# Patient Record
Sex: FEMALE | Race: BLACK OR AFRICAN AMERICAN | NOT HISPANIC OR LATINO | URBAN - METROPOLITAN AREA
[De-identification: names, ages, dates, MRNs, and addresses within clinical notes are randomized per-mention and may not be internally consistent; named-entity substitution may affect disease eponyms.]

---

## 2017-03-08 ENCOUNTER — EMERGENCY (EMERGENCY)
Facility: HOSPITAL | Age: 57
LOS: 0 days | Discharge: HOME | End: 2017-03-08
Attending: EMERGENCY MEDICINE
Payer: COMMERCIAL

## 2017-03-08 VITALS
DIASTOLIC BLOOD PRESSURE: 92 MMHG | OXYGEN SATURATION: 98 % | SYSTOLIC BLOOD PRESSURE: 138 MMHG | TEMPERATURE: 98 F | HEART RATE: 72 BPM | RESPIRATION RATE: 16 BRPM

## 2017-03-08 VITALS
SYSTOLIC BLOOD PRESSURE: 130 MMHG | DIASTOLIC BLOOD PRESSURE: 77 MMHG | TEMPERATURE: 98 F | WEIGHT: 199.96 LBS | OXYGEN SATURATION: 97 % | HEIGHT: 67 IN | RESPIRATION RATE: 16 BRPM | HEART RATE: 72 BPM

## 2017-03-08 DIAGNOSIS — Z98.891 HISTORY OF UTERINE SCAR FROM PREVIOUS SURGERY: Chronic | ICD-10-CM

## 2017-03-08 DIAGNOSIS — Z79.1 LONG TERM (CURRENT) USE OF NON-STEROIDAL ANTI-INFLAMMATORIES (NSAID): ICD-10-CM

## 2017-03-08 DIAGNOSIS — H92.02 OTALGIA, LEFT EAR: ICD-10-CM

## 2017-03-08 DIAGNOSIS — H66.92 OTITIS MEDIA, UNSPECIFIED, LEFT EAR: ICD-10-CM

## 2017-03-08 PROCEDURE — 99283 EMERGENCY DEPT VISIT LOW MDM: CPT | Mod: 25

## 2017-03-08 RX ORDER — IBUPROFEN 200 MG
1 TABLET ORAL
Qty: 9 | Refills: 0
Start: 2017-03-08 | End: 2017-03-11

## 2017-03-08 RX ORDER — IBUPROFEN 200 MG
600 TABLET ORAL ONCE
Qty: 0 | Refills: 0 | Status: COMPLETED | OUTPATIENT
Start: 2017-03-08 | End: 2017-03-08

## 2017-03-08 RX ADMIN — Medication 1 TABLET(S): at 07:59

## 2017-03-08 RX ADMIN — Medication 600 MILLIGRAM(S): at 07:59

## 2017-03-08 NOTE — ED PROVIDER NOTE - OBJECTIVE STATEMENT
57 years old female walked in c/o left ear pain 5/10 nasal congestion and runny nose and sore throat since yesterday. Pt denies headache, dizziness, cough, sob, chest pain, nausea, vomiting, fever, chills, abd pain.

## 2017-03-08 NOTE — ED ADULT TRIAGE NOTE - CHIEF COMPLAINT QUOTE
I started to have pain when I started to swallow and I have pain on the right side of my hairline, ear.

## 2017-03-08 NOTE — ED ADULT NURSE NOTE - OBJECTIVE STATEMENT
Pt states she feels a throbbing in her right ear that radiates to her neck, face and head.  Was sick with flu-like symptoms a few weeks ago.  Has had sinus issues in the past.

## 2017-04-12 NOTE — ED PROVIDER NOTE - MUSCULOSKELETAL NEGATIVE STATEMENT, MLM
GASTROINTESTINAL - ADULT    • Minimal or absence of nausea and vomiting Progressing    • Maintains or returns to baseline bowel function Progressing        HEMATOLOGIC - ADULT    • Maintains hematologic stability Progressing        METABOLIC/FLUID AND ELEC Mastitis     History of delirium     Renal failure (ARF), acute on chronic (HCC)     Uremic acidosis     Acidemia     Hypoxia     Seizure disorder (HCC)     Acute encephalopathy     Anemia of chronic disease     Type 2 diabetes mellitus with diabetic chron no back pain, no gout, no musculoskeletal pain, no neck pain, and no weakness.

## 2018-01-23 PROBLEM — Z00.00 ENCOUNTER FOR PREVENTIVE HEALTH EXAMINATION: Status: ACTIVE | Noted: 2018-01-23

## 2018-02-06 ENCOUNTER — APPOINTMENT (OUTPATIENT)
Dept: ORTHOPEDIC SURGERY | Facility: CLINIC | Age: 58
End: 2018-02-06
Payer: COMMERCIAL

## 2018-02-06 VITALS — WEIGHT: 230 LBS | BODY MASS INDEX: 36.1 KG/M2 | HEIGHT: 67 IN

## 2018-02-06 DIAGNOSIS — Z78.9 OTHER SPECIFIED HEALTH STATUS: ICD-10-CM

## 2018-02-06 PROCEDURE — 73020 X-RAY EXAM OF SHOULDER: CPT | Mod: LT

## 2018-02-06 PROCEDURE — 99203 OFFICE O/P NEW LOW 30 MIN: CPT

## 2018-03-10 NOTE — ED PROVIDER NOTE - CONSTITUTIONAL, MLM
The patient is a 56y Female complaining of congestion. normal... Well appearing, well nourished, awake, alert, oriented to person, place, time/situation and in no apparent distress.

## 2018-09-14 ENCOUNTER — EMERGENCY (EMERGENCY)
Facility: HOSPITAL | Age: 58
LOS: 0 days | Discharge: HOME | End: 2018-09-14
Attending: EMERGENCY MEDICINE
Payer: COMMERCIAL

## 2018-09-14 VITALS
SYSTOLIC BLOOD PRESSURE: 144 MMHG | OXYGEN SATURATION: 96 % | RESPIRATION RATE: 18 BRPM | HEART RATE: 77 BPM | TEMPERATURE: 98 F | DIASTOLIC BLOOD PRESSURE: 78 MMHG

## 2018-09-14 VITALS
DIASTOLIC BLOOD PRESSURE: 62 MMHG | HEIGHT: 67 IN | OXYGEN SATURATION: 97 % | HEART RATE: 78 BPM | RESPIRATION RATE: 18 BRPM | TEMPERATURE: 98 F | SYSTOLIC BLOOD PRESSURE: 110 MMHG | WEIGHT: 259.93 LBS

## 2018-09-14 DIAGNOSIS — R10.2 PELVIC AND PERINEAL PAIN: ICD-10-CM

## 2018-09-14 DIAGNOSIS — N39.0 URINARY TRACT INFECTION, SITE NOT SPECIFIED: ICD-10-CM

## 2018-09-14 DIAGNOSIS — N89.8 OTHER SPECIFIED NONINFLAMMATORY DISORDERS OF VAGINA: ICD-10-CM

## 2018-09-14 DIAGNOSIS — Z98.891 HISTORY OF UTERINE SCAR FROM PREVIOUS SURGERY: Chronic | ICD-10-CM

## 2018-09-14 PROBLEM — K21.9 GASTRO-ESOPHAGEAL REFLUX DISEASE WITHOUT ESOPHAGITIS: Chronic | Status: ACTIVE | Noted: 2017-03-08

## 2018-09-14 LAB
ALBUMIN SERPL ELPH-MCNC: 3.7 G/DL — SIGNIFICANT CHANGE UP (ref 3.3–5)
ALP SERPL-CCNC: 82 U/L — SIGNIFICANT CHANGE UP (ref 40–120)
ALT FLD-CCNC: 34 U/L — SIGNIFICANT CHANGE UP (ref 12–78)
ANION GAP SERPL CALC-SCNC: 7 MMOL/L — SIGNIFICANT CHANGE UP (ref 5–17)
APPEARANCE UR: CLEAR — SIGNIFICANT CHANGE UP
AST SERPL-CCNC: 19 U/L — SIGNIFICANT CHANGE UP (ref 15–37)
BASOPHILS # BLD AUTO: 0.02 K/UL — SIGNIFICANT CHANGE UP (ref 0–0.2)
BASOPHILS NFR BLD AUTO: 0.5 % — SIGNIFICANT CHANGE UP (ref 0–2)
BILIRUB SERPL-MCNC: 0.4 MG/DL — SIGNIFICANT CHANGE UP (ref 0.2–1.2)
BILIRUB UR-MCNC: NEGATIVE — SIGNIFICANT CHANGE UP
BUN SERPL-MCNC: 9 MG/DL — SIGNIFICANT CHANGE UP (ref 7–23)
C TRACH RRNA SPEC QL NAA+PROBE: SIGNIFICANT CHANGE UP
CALCIUM SERPL-MCNC: 8.3 MG/DL — LOW (ref 8.5–10.1)
CHLORIDE SERPL-SCNC: 105 MMOL/L — SIGNIFICANT CHANGE UP (ref 96–108)
CO2 SERPL-SCNC: 29 MMOL/L — SIGNIFICANT CHANGE UP (ref 22–31)
COLOR SPEC: YELLOW — SIGNIFICANT CHANGE UP
CREAT SERPL-MCNC: 0.85 MG/DL — SIGNIFICANT CHANGE UP (ref 0.5–1.3)
DIFF PNL FLD: ABNORMAL
EOSINOPHIL # BLD AUTO: 0.02 K/UL — SIGNIFICANT CHANGE UP (ref 0–0.5)
EOSINOPHIL NFR BLD AUTO: 0.5 % — SIGNIFICANT CHANGE UP (ref 0–6)
GLUCOSE SERPL-MCNC: 111 MG/DL — HIGH (ref 70–99)
GLUCOSE UR QL: NEGATIVE MG/DL — SIGNIFICANT CHANGE UP
HCT VFR BLD CALC: 36.3 % — SIGNIFICANT CHANGE UP (ref 34.5–45)
HGB BLD-MCNC: 11.6 G/DL — SIGNIFICANT CHANGE UP (ref 11.5–15.5)
IMM GRANULOCYTES NFR BLD AUTO: 0.2 % — SIGNIFICANT CHANGE UP (ref 0–1.5)
KETONES UR-MCNC: NEGATIVE — SIGNIFICANT CHANGE UP
LACTATE SERPL-SCNC: 1 MMOL/L — SIGNIFICANT CHANGE UP (ref 0.7–2)
LEUKOCYTE ESTERASE UR-ACNC: ABNORMAL
LYMPHOCYTES # BLD AUTO: 2.68 K/UL — SIGNIFICANT CHANGE UP (ref 1–3.3)
LYMPHOCYTES # BLD AUTO: 61.6 % — HIGH (ref 13–44)
MCHC RBC-ENTMCNC: 28.9 PG — SIGNIFICANT CHANGE UP (ref 27–34)
MCHC RBC-ENTMCNC: 32 GM/DL — SIGNIFICANT CHANGE UP (ref 32–36)
MCV RBC AUTO: 90.5 FL — SIGNIFICANT CHANGE UP (ref 80–100)
MONOCYTES # BLD AUTO: 0.15 K/UL — SIGNIFICANT CHANGE UP (ref 0–0.9)
MONOCYTES NFR BLD AUTO: 3.4 % — SIGNIFICANT CHANGE UP (ref 2–14)
N GONORRHOEA RRNA SPEC QL NAA+PROBE: SIGNIFICANT CHANGE UP
NEUTROPHILS # BLD AUTO: 1.47 K/UL — LOW (ref 1.8–7.4)
NEUTROPHILS NFR BLD AUTO: 33.8 % — LOW (ref 43–77)
NITRITE UR-MCNC: NEGATIVE — SIGNIFICANT CHANGE UP
PH UR: 6 — SIGNIFICANT CHANGE UP (ref 5–8)
PLATELET # BLD AUTO: 156 K/UL — SIGNIFICANT CHANGE UP (ref 150–400)
POTASSIUM SERPL-MCNC: 3.6 MMOL/L — SIGNIFICANT CHANGE UP (ref 3.5–5.3)
POTASSIUM SERPL-SCNC: 3.6 MMOL/L — SIGNIFICANT CHANGE UP (ref 3.5–5.3)
PROT SERPL-MCNC: 8.4 GM/DL — HIGH (ref 6–8.3)
PROT UR-MCNC: 30 MG/DL
RBC # BLD: 4.01 M/UL — SIGNIFICANT CHANGE UP (ref 3.8–5.2)
RBC # FLD: 15.7 % — HIGH (ref 10.3–14.5)
SODIUM SERPL-SCNC: 141 MMOL/L — SIGNIFICANT CHANGE UP (ref 135–145)
SP GR SPEC: 1.01 — SIGNIFICANT CHANGE UP (ref 1.01–1.02)
SPECIMEN SOURCE: SIGNIFICANT CHANGE UP
UROBILINOGEN FLD QL: NEGATIVE MG/DL — SIGNIFICANT CHANGE UP
WBC # BLD: 4.35 K/UL — SIGNIFICANT CHANGE UP (ref 3.8–10.5)
WBC # FLD AUTO: 4.35 K/UL — SIGNIFICANT CHANGE UP (ref 3.8–10.5)

## 2018-09-14 PROCEDURE — 74176 CT ABD & PELVIS W/O CONTRAST: CPT | Mod: 26

## 2018-09-14 PROCEDURE — 99284 EMERGENCY DEPT VISIT MOD MDM: CPT | Mod: 25

## 2018-09-14 PROCEDURE — 76856 US EXAM PELVIC COMPLETE: CPT | Mod: 26

## 2018-09-14 RX ORDER — CEFPODOXIME PROXETIL 100 MG
100 TABLET ORAL ONCE
Qty: 0 | Refills: 0 | Status: COMPLETED | OUTPATIENT
Start: 2018-09-14 | End: 2018-09-14

## 2018-09-14 RX ORDER — CEFPODOXIME PROXETIL 100 MG
1 TABLET ORAL
Qty: 14 | Refills: 0
Start: 2018-09-14 | End: 2018-09-20

## 2018-09-14 RX ORDER — METRONIDAZOLE 500 MG
1 TABLET ORAL
Qty: 14 | Refills: 0
Start: 2018-09-14

## 2018-09-14 RX ADMIN — Medication 100 MILLIGRAM(S): at 12:05

## 2018-09-14 NOTE — ED ADULT NURSE NOTE - NSIMPLEMENTINTERV_GEN_ALL_ED
Implemented All Universal Safety Interventions:  [place] to call system. Call bell, personal items and telephone within reach. Instruct patient to call for assistance. Room bathroom lighting operational. Non-slip footwear when patient is off stretcher. Physically safe environment: no spills, clutter or unnecessary equipment. Stretcher in lowest position, wheels locked, appropriate side rails in place.

## 2018-09-14 NOTE — ED PROVIDER NOTE - MEDICAL DECISION MAKING DETAILS
vaginal discharge in setting of chemo, no systemic symptoms. basic labs, u/s and ct r/o any abscess. likely chemo/radiation related. will consult gyn and given close gyn f/u.

## 2018-09-14 NOTE — ED ADULT NURSE NOTE - OBJECTIVE STATEMENT
Complaint of lower abdominal discomfort, discharge starting several days ago, malodorous discharge, reports having hx of uterine cancer, hysterectomy in april, denies fever, chills, n/v/d.

## 2018-09-14 NOTE — ED PROVIDER NOTE - PHYSICAL EXAMINATION
Gen: No acute distress, non toxic, very well appearing, ambulating around ED.   HEENT: Mucous membranes moist, pink conjunctivae, EOMI  CV: RRR, nl s1/s2.  Resp: CTAB, normal rate and effort  GI: Abdomen soft, NT, ND. No rebound, no guarding  : No CVAT  Neuro: A&O x 3, moving all 4 extremities  MSK: No spine or joint tenderness to palpation  Skin: No rashes. intact and perfused.

## 2018-09-14 NOTE — ED ADULT TRIAGE NOTE - CHIEF COMPLAINT QUOTE
pt c/o "burning, soreness" and "smelly discharge" lower abd, discomfort when she sits.   pt currently receiving chemo for uterine CA.  pt had hysterectomy in April and has L-thigh thumbness since.

## 2018-09-14 NOTE — ED ADULT NURSE NOTE - NS ED NURSE DC TEACHING
----- Message from DAISHA Sosa sent at 12/24/2017 12:42 PM EST -----  Normal lab results, hepatitis, HIV, and STD testing all normal. B12 and Vit D levels normal. Continue vitamins and follow up 1-2 months.   OB/GYN

## 2018-09-14 NOTE — ED PROVIDER NOTE - PROGRESS NOTE DETAILS
Mira: pt feels well, afebrile, wbc on urine, small white malodorous discharge on chaperoned pelvic (by radha Sow). possible BV. will give flagyl and cefpodoxime (for any uti). ob/gyn f/u. return precautions. jenny for d/c.

## 2018-09-15 LAB
CULTURE RESULTS: NO GROWTH — SIGNIFICANT CHANGE UP
SPECIMEN SOURCE: SIGNIFICANT CHANGE UP

## 2018-12-11 ENCOUNTER — APPOINTMENT (OUTPATIENT)
Dept: VASCULAR SURGERY | Facility: CLINIC | Age: 58
End: 2018-12-11
Payer: COMMERCIAL

## 2018-12-11 VITALS
HEART RATE: 78 BPM | WEIGHT: 268 LBS | HEIGHT: 65.5 IN | DIASTOLIC BLOOD PRESSURE: 71 MMHG | SYSTOLIC BLOOD PRESSURE: 118 MMHG | OXYGEN SATURATION: 97 % | BODY MASS INDEX: 44.11 KG/M2 | TEMPERATURE: 98 F

## 2018-12-11 DIAGNOSIS — M79.9 SOFT TISSUE DISORDER, UNSPECIFIED: ICD-10-CM

## 2018-12-11 PROCEDURE — 93971 EXTREMITY STUDY: CPT

## 2018-12-11 PROCEDURE — 99242 OFF/OP CONSLTJ NEW/EST SF 20: CPT

## 2018-12-11 RX ORDER — TRIAMCINOLONE ACETONIDE 1 MG/G
0.1 CREAM TOPICAL TWICE DAILY
Qty: 1 | Refills: 3 | Status: ACTIVE | COMMUNITY
Start: 2018-12-11 | End: 1900-01-01

## 2019-01-04 ENCOUNTER — APPOINTMENT (OUTPATIENT)
Dept: UROLOGY | Facility: CLINIC | Age: 59
End: 2019-01-04

## 2019-02-26 ENCOUNTER — APPOINTMENT (OUTPATIENT)
Dept: VASCULAR SURGERY | Facility: CLINIC | Age: 59
End: 2019-02-26

## 2019-09-14 ENCOUNTER — EMERGENCY (EMERGENCY)
Facility: HOSPITAL | Age: 59
LOS: 0 days | Discharge: HOME | End: 2019-09-14
Attending: STUDENT IN AN ORGANIZED HEALTH CARE EDUCATION/TRAINING PROGRAM
Payer: COMMERCIAL

## 2019-09-14 VITALS
HEART RATE: 74 BPM | HEIGHT: 67 IN | RESPIRATION RATE: 16 BRPM | OXYGEN SATURATION: 100 % | SYSTOLIC BLOOD PRESSURE: 131 MMHG | TEMPERATURE: 98 F | DIASTOLIC BLOOD PRESSURE: 82 MMHG | WEIGHT: 229.94 LBS

## 2019-09-14 VITALS
RESPIRATION RATE: 18 BRPM | OXYGEN SATURATION: 97 % | HEART RATE: 68 BPM | DIASTOLIC BLOOD PRESSURE: 82 MMHG | TEMPERATURE: 99 F | SYSTOLIC BLOOD PRESSURE: 134 MMHG

## 2019-09-14 DIAGNOSIS — M25.511 PAIN IN RIGHT SHOULDER: ICD-10-CM
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
## 2019-09-14 DIAGNOSIS — Z98.891 HISTORY OF UTERINE SCAR FROM PREVIOUS SURGERY: Chronic | ICD-10-CM

## 2019-09-14 DIAGNOSIS — M25.50 PAIN IN UNSPECIFIED JOINT: ICD-10-CM

## 2019-09-14 DIAGNOSIS — M25.562 PAIN IN LEFT KNEE: ICD-10-CM

## 2019-09-14 DIAGNOSIS — S70.02XA CONTUSION OF LEFT HIP, INITIAL ENCOUNTER: ICD-10-CM
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
## 2019-09-14 PROCEDURE — 99283 EMERGENCY DEPT VISIT LOW MDM: CPT

## 2019-09-14 PROCEDURE — 73030 X-RAY EXAM OF SHOULDER: CPT | Mod: 26,RT

## 2019-09-14 PROCEDURE — 73502 X-RAY EXAM HIP UNI 2-3 VIEWS: CPT | Mod: 26,LT

## 2019-09-14 PROCEDURE — 73562 X-RAY EXAM OF KNEE 3: CPT | Mod: 26,LT

## 2019-09-14 RX ORDER — ACETAMINOPHEN 500 MG
650 TABLET ORAL ONCE
Refills: 0 | Status: COMPLETED | OUTPATIENT
Start: 2019-09-14 | End: 2019-09-14

## 2019-09-14 RX ADMIN — Medication 650 MILLIGRAM(S): at 10:25

## 2019-09-14 RX ADMIN — Medication 650 MILLIGRAM(S): at 08:18

## 2019-09-14 NOTE — ED PROVIDER NOTE - OBJECTIVE STATEMENT
59 year old female presents today c/o tripping and falling yesterday morning after 5am as she was going to work, she reports tripping over a hose which she did not see, landed mostly onto her left side as she attempted to break her fall using her right hand, she did fee/ pain initially to her left lower back, left hip and left knee which worsened last night, this morning she woke up with severe pain in her right shoulder with decreased range of motion (-) head injury (-) LOC (_) nausea or vomiting (-) chest pain (-) sob (-) neck pain

## 2019-09-14 NOTE — ED ADULT TRIAGE NOTE - CHIEF COMPLAINT QUOTE
Fell last night, No LOC, Right shoulder pain, lower back pain and left knee pain Fell yesterday at 5a, No LOC, Right shoulder pain, lower back pain and left knee pain, unable to lift right arm today with pain

## 2019-09-14 NOTE — ED ADULT NURSE NOTE - CHIEF COMPLAINT QUOTE
Fell yesterday at 5a, No LOC, Right shoulder pain, lower back pain and left knee pain, unable to lift right arm today with pain

## 2019-09-14 NOTE — ED ADULT NURSE NOTE - OBJECTIVE STATEMENT
Fell yesterday at 5a, No LOC, Right shoulder pain, lower back pain and left knee pain, unable to lift right arm today with pain. pt states she tripped over a garden hose and she landed on the neighbors law, no LOC, no headache, no dizziness, pt [id number]

## 2019-09-14 NOTE — ED PROVIDER NOTE - CARE PROVIDER_API CALL
Vasyl Littlejohn)  Orthopaedic Surgery  [address] [place] [address]  Phone: [phone]  Fax: [phone]  Follow Up Time:

## 2019-09-14 NOTE — ED ADULT NURSE NOTE - NSIMPLEMENTINTERV_GEN_ALL_ED
Implemented All Universal Safety Interventions:  [place] to call system. Call bell, personal items and telephone within reach. Instruct patient to call for assistance. Room bathroom lighting operational. Non-slip footwear when patient is off stretcher. Physically safe environment: no spills, clutter or unnecessary equipment. Stretcher in lowest position, wheels locked, appropriate side rails in place.

## 2019-09-14 NOTE — ED PROVIDER NOTE - PATIENT PORTAL LINK FT
You can access the FollowMyHealth Patient Portal offered by [facility] by registering at the following website: [email or web address]. By joining [employer]’s FollowMyHealth portal, you will also be able to view your health information using other applications (apps) compatible with our system.

## 2019-09-14 NOTE — ED ADULT NURSE NOTE - CHPI ED NUR SYMPTOMS NEG
no loss of consciousness/no numbness/no vomiting/no abrasion/no fever/no tingling/no weakness/no confusion/no bleeding/no deformity

## 2019-09-14 NOTE — ED PROVIDER NOTE - CARE PLAN
Principal Discharge DX:	Acute pain of right shoulder Principal Discharge DX:	Acute pain of right shoulder  Secondary Diagnosis:	Contusion of left hip, initial encounter  Secondary Diagnosis:	Knee pain, left

## 2020-05-23 ENCOUNTER — EMERGENCY (EMERGENCY)
Facility: HOSPITAL | Age: 60
LOS: 0 days | Discharge: HOME | End: 2020-05-23
Attending: EMERGENCY MEDICINE
Payer: COMMERCIAL

## 2020-05-23 VITALS
HEART RATE: 76 BPM | OXYGEN SATURATION: 98 % | DIASTOLIC BLOOD PRESSURE: 84 MMHG | TEMPERATURE: 98 F | RESPIRATION RATE: 17 BRPM | SYSTOLIC BLOOD PRESSURE: 142 MMHG

## 2020-05-23 VITALS
TEMPERATURE: 98 F | HEIGHT: 67 IN | DIASTOLIC BLOOD PRESSURE: 94 MMHG | OXYGEN SATURATION: 98 % | HEART RATE: 83 BPM | SYSTOLIC BLOOD PRESSURE: 147 MMHG | RESPIRATION RATE: 16 BRPM | WEIGHT: 250 LBS

## 2020-05-23 DIAGNOSIS — R10.9 UNSPECIFIED ABDOMINAL PAIN: ICD-10-CM

## 2020-05-23 DIAGNOSIS — Z98.891 HISTORY OF UTERINE SCAR FROM PREVIOUS SURGERY: Chronic | ICD-10-CM

## 2020-05-23 DIAGNOSIS — E11.9 TYPE 2 DIABETES MELLITUS WITHOUT COMPLICATIONS: ICD-10-CM

## 2020-05-23 LAB
ALBUMIN SERPL ELPH-MCNC: 4 G/DL — SIGNIFICANT CHANGE UP (ref 3.3–5)
ALP SERPL-CCNC: 91 U/L — SIGNIFICANT CHANGE UP (ref 40–120)
ALT FLD-CCNC: 41 U/L — SIGNIFICANT CHANGE UP (ref 12–78)
ANION GAP SERPL CALC-SCNC: 4 MMOL/L — LOW (ref 5–17)
APPEARANCE UR: CLEAR — SIGNIFICANT CHANGE UP
AST SERPL-CCNC: 31 U/L — SIGNIFICANT CHANGE UP (ref 15–37)
BACTERIA # UR AUTO: ABNORMAL
BASOPHILS # BLD AUTO: 0.03 K/UL — SIGNIFICANT CHANGE UP (ref 0–0.2)
BASOPHILS NFR BLD AUTO: 0.4 % — SIGNIFICANT CHANGE UP (ref 0–2)
BILIRUB SERPL-MCNC: 0.4 MG/DL — SIGNIFICANT CHANGE UP (ref 0.2–1.2)
BILIRUB UR-MCNC: NEGATIVE — SIGNIFICANT CHANGE UP
BUN SERPL-MCNC: 9 MG/DL — SIGNIFICANT CHANGE UP (ref 7–23)
CALCIUM SERPL-MCNC: 9.3 MG/DL — SIGNIFICANT CHANGE UP (ref 8.5–10.1)
CHLORIDE SERPL-SCNC: 103 MMOL/L — SIGNIFICANT CHANGE UP (ref 96–108)
CO2 SERPL-SCNC: 32 MMOL/L — HIGH (ref 22–31)
COLOR SPEC: YELLOW — SIGNIFICANT CHANGE UP
CREAT SERPL-MCNC: 0.89 MG/DL — SIGNIFICANT CHANGE UP (ref 0.5–1.3)
DIFF PNL FLD: ABNORMAL
EOSINOPHIL # BLD AUTO: 0.05 K/UL — SIGNIFICANT CHANGE UP (ref 0–0.5)
EOSINOPHIL NFR BLD AUTO: 0.6 % — SIGNIFICANT CHANGE UP (ref 0–6)
EPI CELLS # UR: SIGNIFICANT CHANGE UP
GLUCOSE SERPL-MCNC: 190 MG/DL — HIGH (ref 70–99)
GLUCOSE UR QL: NEGATIVE MG/DL — SIGNIFICANT CHANGE UP
HCT VFR BLD CALC: 44.5 % — SIGNIFICANT CHANGE UP (ref 34.5–45)
HGB BLD-MCNC: 13.9 G/DL — SIGNIFICANT CHANGE UP (ref 11.5–15.5)
IMM GRANULOCYTES NFR BLD AUTO: 0.1 % — SIGNIFICANT CHANGE UP (ref 0–1.5)
KETONES UR-MCNC: NEGATIVE — SIGNIFICANT CHANGE UP
LACTATE SERPL-SCNC: 1.4 MMOL/L — SIGNIFICANT CHANGE UP (ref 0.7–2)
LEUKOCYTE ESTERASE UR-ACNC: NEGATIVE — SIGNIFICANT CHANGE UP
LIDOCAIN IGE QN: 50 U/L — LOW (ref 73–393)
LYMPHOCYTES # BLD AUTO: 3.5 K/UL — HIGH (ref 1–3.3)
LYMPHOCYTES # BLD AUTO: 43.5 % — SIGNIFICANT CHANGE UP (ref 13–44)
MCHC RBC-ENTMCNC: 27.4 PG — SIGNIFICANT CHANGE UP (ref 27–34)
MCHC RBC-ENTMCNC: 31.2 GM/DL — LOW (ref 32–36)
MCV RBC AUTO: 87.6 FL — SIGNIFICANT CHANGE UP (ref 80–100)
MONOCYTES # BLD AUTO: 0.57 K/UL — SIGNIFICANT CHANGE UP (ref 0–0.9)
MONOCYTES NFR BLD AUTO: 7.1 % — SIGNIFICANT CHANGE UP (ref 2–14)
NEUTROPHILS # BLD AUTO: 3.88 K/UL — SIGNIFICANT CHANGE UP (ref 1.8–7.4)
NEUTROPHILS NFR BLD AUTO: 48.3 % — SIGNIFICANT CHANGE UP (ref 43–77)
NITRITE UR-MCNC: NEGATIVE — SIGNIFICANT CHANGE UP
NRBC # BLD: 0 /100 WBCS — SIGNIFICANT CHANGE UP (ref 0–0)
PH UR: 6 — SIGNIFICANT CHANGE UP (ref 5–8)
PLATELET # BLD AUTO: 245 K/UL — SIGNIFICANT CHANGE UP (ref 150–400)
POTASSIUM SERPL-MCNC: 4.3 MMOL/L — SIGNIFICANT CHANGE UP (ref 3.5–5.3)
POTASSIUM SERPL-SCNC: 4.3 MMOL/L — SIGNIFICANT CHANGE UP (ref 3.5–5.3)
PROT SERPL-MCNC: 8.8 GM/DL — HIGH (ref 6–8.3)
PROT UR-MCNC: 30 MG/DL
RBC # BLD: 5.08 M/UL — SIGNIFICANT CHANGE UP (ref 3.8–5.2)
RBC # FLD: 14.5 % — SIGNIFICANT CHANGE UP (ref 10.3–14.5)
RBC CASTS # UR COMP ASSIST: SIGNIFICANT CHANGE UP /HPF (ref 0–4)
SODIUM SERPL-SCNC: 139 MMOL/L — SIGNIFICANT CHANGE UP (ref 135–145)
SP GR SPEC: 1.01 — SIGNIFICANT CHANGE UP (ref 1.01–1.02)
UROBILINOGEN FLD QL: NEGATIVE MG/DL — SIGNIFICANT CHANGE UP
WBC # BLD: 8.04 K/UL — SIGNIFICANT CHANGE UP (ref 3.8–10.5)
WBC # FLD AUTO: 8.04 K/UL — SIGNIFICANT CHANGE UP (ref 3.8–10.5)
WBC UR QL: SIGNIFICANT CHANGE UP

## 2020-05-23 PROCEDURE — 99285 EMERGENCY DEPT VISIT HI MDM: CPT

## 2020-05-23 PROCEDURE — 74177 CT ABD & PELVIS W/CONTRAST: CPT | Mod: 26

## 2020-05-23 RX ORDER — ONDANSETRON 8 MG/1
8 TABLET, FILM COATED ORAL ONCE
Refills: 0 | Status: COMPLETED | OUTPATIENT
Start: 2020-05-23 | End: 2020-05-23

## 2020-05-23 RX ORDER — IOHEXOL 300 MG/ML
30 INJECTION, SOLUTION INTRAVENOUS ONCE
Refills: 0 | Status: COMPLETED | OUTPATIENT
Start: 2020-05-23 | End: 2020-05-23

## 2020-05-23 RX ORDER — SODIUM CHLORIDE 9 MG/ML
1000 INJECTION INTRAMUSCULAR; INTRAVENOUS; SUBCUTANEOUS ONCE
Refills: 0 | Status: COMPLETED | OUTPATIENT
Start: 2020-05-23 | End: 2020-05-23

## 2020-05-23 RX ORDER — MORPHINE SULFATE 50 MG/1
4 CAPSULE, EXTENDED RELEASE ORAL ONCE
Refills: 0 | Status: DISCONTINUED | OUTPATIENT
Start: 2020-05-23 | End: 2020-05-23

## 2020-05-23 RX ADMIN — SODIUM CHLORIDE 1000 MILLILITER(S): 9 INJECTION INTRAMUSCULAR; INTRAVENOUS; SUBCUTANEOUS at 11:50

## 2020-05-23 RX ADMIN — IOHEXOL 30 MILLILITER(S): 300 INJECTION, SOLUTION INTRAVENOUS at 11:50

## 2020-05-23 RX ADMIN — ONDANSETRON 8 MILLIGRAM(S): 8 TABLET, FILM COATED ORAL at 11:49

## 2020-05-23 RX ADMIN — MORPHINE SULFATE 4 MILLIGRAM(S): 50 CAPSULE, EXTENDED RELEASE ORAL at 11:50

## 2020-05-23 NOTE — ED ADULT NURSE NOTE - NSIMPLEMENTINTERV_GEN_ALL_ED
Implemented All Universal Safety Interventions:  [place] to call system. Call bell, personal items and telephone within reach. Instruct patient to call for assistance. Room bathroom lighting operational. Non-slip footwear when patient is off stretcher. Physically safe environment: no spills, clutter or unnecessary equipment. Stretcher in lowest position, wheels locked, appropriate side rails in place.

## 2020-05-23 NOTE — ED PROVIDER NOTE - NSFOLLOWUPINSTRUCTIONS_ED_ALL_ED_FT
You were prescribed anti nausea medications. Please continue high fiber intake or metamucil to regulate your bowels. Please return to ER if worsening persistent pain, fever, cp, sob, dizziness.    Abdominal Pain    Many things can cause abdominal pain. Many times, abdominal pain is not caused by a disease and will improve without treatment. Your health care provider will do a physical exam to determine if there is a dangerous cause of your pain; blood tests and imaging may help determine the cause of your pain. However, in many cases, no cause may be found and you may need further testing as an outpatient. Monitor your abdominal pain for any changes.     SEEK IMMEDIATE MEDICAL CARE IF YOU HAVE ANY OF THE FOLLOWING SYMPTOMS: worsening abdominal pain, uncontrollable vomiting, profuse diarrhea, inability to have bowel movements or pass gas, black or bloody stools, fever accompanying chest pain or back pain, or fainting. These symptoms may represent a serious problem that is an emergency. Do not wait to see if the symptoms will go away. Get medical help right away. Call 911 and do not drive yourself to the hospital.

## 2020-05-23 NOTE — ED PROVIDER NOTE - OBJECTIVE STATEMENT
61yo female with pmh DM, psh: CS x 2. hysterectomy present with mid abd pain since yesterday but worse this am. small bm this morning but no flatus since. + vomit x 1. no fever, chills. Pt + Covid 2 months ago, neg since. no fever, chills, cough, body aches, sob.     No fever/chills, No photophobia/eye pain/changes in vision, No ear pain/sore throat/dysphagia, No chest pain/palpitations, no SOB/cough, no wheeze/stridor, No abdominal pain, No N/V/D, no dysuria/frequency/discharge, No neck/back pain, no rash, no changes in neurological status/function. 59yo female with pmh DM, psh: CS x 2. hysterectomy present with mid abd pain since yesterday but worse this am. pt was constipated x 2 days so has some prunes yesterday and had BM this am.  + vomit x 1. no fever, chills. Pt + Covid 2 months ago, neg since. no fever, chills, cough, body aches, sob.     No fever/chills, No photophobia/eye pain/changes in vision, No ear pain/sore throat/dysphagia, No chest pain/palpitations, no SOB/cough, no wheeze/stridor, No abdominal pain, No N/V/D, no dysuria/frequency/discharge, No neck/back pain, no rash, no changes in neurological status/function.

## 2020-05-23 NOTE — ED PROVIDER NOTE - PATIENT PORTAL LINK FT
You can access the FollowMyHealth Patient Portal offered by [facility] by registering at the following website: [email or web address]. By joining [employer]’s FollowMyHealth portal, you will also be able to view your health information using other applications (apps) compatible with our system.

## 2020-05-23 NOTE — ED PROVIDER NOTE - PHYSICAL EXAMINATION
Gen: Alert, Well appearing. NAD    Head: NC, AT, PERRL, normal lids/conjunctiva   ENT: Bilateral TM WNL, patent oropharynx without erythema/exudate, uvula midline  Neck: supple, no tenderness/meningismus  Pulm: Bilateral clear BS, normal resp effort  CV: RRR, no M/R/G, +dist pulses   Abd: soft, + mid abd tenderness, ND, +BS, no guarding/rebound tenderness  Mskel: no edema/erythema/cyanosis   Skin: no rash, no bruising  Neuro: AAOx3, no sensory/motor deficits, CN 2-12 intact Gen: Alert, Well appearing. NAD    Head: NC, AT, PERRL, normal lids/conjunctiva   ENT: Bilateral TM WNL, patent oropharynx without erythema/exudate, uvula midline  Neck: supple, no tenderness/meningismus  Pulm: Bilateral clear BS, normal resp effort  CV: RRR, no M/R/G, +dist pulses   Abd: soft, NT/ND, +BS, no guarding/rebound tenderness  Mskel: no edema/erythema/cyanosis   Skin: no rash, no bruising  Neuro: AAOx3, no sensory/motor deficits, CN 2-12 intact

## 2020-05-23 NOTE — ED PROVIDER NOTE - CLINICAL SUMMARY MEDICAL DECISION MAKING FREE TEXT BOX
Lab values do not require emergent intervention. CT scan demonstrates no acute pathology. Pt feeling much better, deniaes any pain,tolerate po. unclear etiology of pain, given strict precautions for return and anti emetics

## 2020-05-25 LAB
CULTURE RESULTS: SIGNIFICANT CHANGE UP
SPECIMEN SOURCE: SIGNIFICANT CHANGE UP

## 2020-06-03 NOTE — ED ADULT NURSE NOTE - CADM POA CENTRAL LINE
Detail Level: Simple Additional Notes: Patient consent was obtained to proceed with the visit and recommended plan of care after discussion of all risks and benefits, including the risks of COVID-19 exposure. No

## 2020-07-26 ENCOUNTER — INPATIENT (INPATIENT)
Facility: HOSPITAL | Age: 60
LOS: 1 days | Discharge: HOME | End: 2020-07-28
Attending: INTERNAL MEDICINE | Admitting: INTERNAL MEDICINE
Payer: COMMERCIAL

## 2020-07-26 VITALS
HEART RATE: 81 BPM | DIASTOLIC BLOOD PRESSURE: 101 MMHG | HEIGHT: 67 IN | RESPIRATION RATE: 18 BRPM | OXYGEN SATURATION: 97 % | WEIGHT: 250 LBS | TEMPERATURE: 98 F | SYSTOLIC BLOOD PRESSURE: 133 MMHG

## 2020-07-26 DIAGNOSIS — K52.9 NONINFECTIVE GASTROENTERITIS AND COLITIS, UNSPECIFIED: ICD-10-CM

## 2020-07-26 DIAGNOSIS — R18.8 OTHER ASCITES: ICD-10-CM

## 2020-07-26 DIAGNOSIS — Z98.891 HISTORY OF UTERINE SCAR FROM PREVIOUS SURGERY: Chronic | ICD-10-CM

## 2020-07-26 DIAGNOSIS — R10.9 UNSPECIFIED ABDOMINAL PAIN: ICD-10-CM

## 2020-07-26 LAB
ALBUMIN SERPL ELPH-MCNC: 3.9 G/DL — SIGNIFICANT CHANGE UP (ref 3.3–5)
ALP SERPL-CCNC: 84 U/L — SIGNIFICANT CHANGE UP (ref 40–120)
ALT FLD-CCNC: 33 U/L — SIGNIFICANT CHANGE UP (ref 12–78)
ANION GAP SERPL CALC-SCNC: 9 MMOL/L — SIGNIFICANT CHANGE UP (ref 5–17)
APPEARANCE UR: CLEAR — SIGNIFICANT CHANGE UP
APTT BLD: 30.3 SEC — SIGNIFICANT CHANGE UP (ref 27.5–35.5)
AST SERPL-CCNC: 24 U/L — SIGNIFICANT CHANGE UP (ref 15–37)
BACTERIA # UR AUTO: ABNORMAL
BASOPHILS # BLD AUTO: 0.03 K/UL — SIGNIFICANT CHANGE UP (ref 0–0.2)
BASOPHILS NFR BLD AUTO: 0.2 % — SIGNIFICANT CHANGE UP (ref 0–2)
BILIRUB SERPL-MCNC: 0.3 MG/DL — SIGNIFICANT CHANGE UP (ref 0.2–1.2)
BILIRUB UR-MCNC: NEGATIVE — SIGNIFICANT CHANGE UP
BUN SERPL-MCNC: 13 MG/DL — SIGNIFICANT CHANGE UP (ref 7–23)
CALCIUM SERPL-MCNC: 9.1 MG/DL — SIGNIFICANT CHANGE UP (ref 8.5–10.1)
CHLORIDE SERPL-SCNC: 107 MMOL/L — SIGNIFICANT CHANGE UP (ref 96–108)
CO2 SERPL-SCNC: 24 MMOL/L — SIGNIFICANT CHANGE UP (ref 22–31)
COLOR SPEC: YELLOW — SIGNIFICANT CHANGE UP
CREAT SERPL-MCNC: 0.89 MG/DL — SIGNIFICANT CHANGE UP (ref 0.5–1.3)
DIFF PNL FLD: ABNORMAL
EOSINOPHIL # BLD AUTO: 0.01 K/UL — SIGNIFICANT CHANGE UP (ref 0–0.5)
EOSINOPHIL NFR BLD AUTO: 0.1 % — SIGNIFICANT CHANGE UP (ref 0–6)
EPI CELLS # UR: SIGNIFICANT CHANGE UP
GLUCOSE BLDC GLUCOMTR-MCNC: 192 MG/DL — HIGH (ref 70–99)
GLUCOSE SERPL-MCNC: 201 MG/DL — HIGH (ref 70–99)
GLUCOSE UR QL: 50 MG/DL
HCT VFR BLD CALC: 44.1 % — SIGNIFICANT CHANGE UP (ref 34.5–45)
HGB BLD-MCNC: 13.9 G/DL — SIGNIFICANT CHANGE UP (ref 11.5–15.5)
IMM GRANULOCYTES NFR BLD AUTO: 0.3 % — SIGNIFICANT CHANGE UP (ref 0–1.5)
INR BLD: 1.15 RATIO — SIGNIFICANT CHANGE UP (ref 0.88–1.16)
KETONES UR-MCNC: ABNORMAL
LEUKOCYTE ESTERASE UR-ACNC: NEGATIVE — SIGNIFICANT CHANGE UP
LIDOCAIN IGE QN: 64 U/L — LOW (ref 73–393)
LYMPHOCYTES # BLD AUTO: 39.5 % — SIGNIFICANT CHANGE UP (ref 13–44)
LYMPHOCYTES # BLD AUTO: 4.91 K/UL — HIGH (ref 1–3.3)
MCHC RBC-ENTMCNC: 27.6 PG — SIGNIFICANT CHANGE UP (ref 27–34)
MCHC RBC-ENTMCNC: 31.5 GM/DL — LOW (ref 32–36)
MCV RBC AUTO: 87.5 FL — SIGNIFICANT CHANGE UP (ref 80–100)
MONOCYTES # BLD AUTO: 0.61 K/UL — SIGNIFICANT CHANGE UP (ref 0–0.9)
MONOCYTES NFR BLD AUTO: 4.9 % — SIGNIFICANT CHANGE UP (ref 2–14)
NEUTROPHILS # BLD AUTO: 6.84 K/UL — SIGNIFICANT CHANGE UP (ref 1.8–7.4)
NEUTROPHILS NFR BLD AUTO: 55 % — SIGNIFICANT CHANGE UP (ref 43–77)
NITRITE UR-MCNC: NEGATIVE — SIGNIFICANT CHANGE UP
NRBC # BLD: 0 /100 WBCS — SIGNIFICANT CHANGE UP (ref 0–0)
PH UR: 6 — SIGNIFICANT CHANGE UP (ref 5–8)
PLATELET # BLD AUTO: 227 K/UL — SIGNIFICANT CHANGE UP (ref 150–400)
POTASSIUM SERPL-MCNC: 3.3 MMOL/L — LOW (ref 3.5–5.3)
POTASSIUM SERPL-SCNC: 3.3 MMOL/L — LOW (ref 3.5–5.3)
PROT SERPL-MCNC: 9.1 GM/DL — HIGH (ref 6–8.3)
PROT UR-MCNC: 100 MG/DL
PROTHROM AB SERPL-ACNC: 12.7 SEC — SIGNIFICANT CHANGE UP (ref 10.6–13.6)
RBC # BLD: 5.04 M/UL — SIGNIFICANT CHANGE UP (ref 3.8–5.2)
RBC # FLD: 14.2 % — SIGNIFICANT CHANGE UP (ref 10.3–14.5)
RBC CASTS # UR COMP ASSIST: ABNORMAL /HPF (ref 0–4)
SODIUM SERPL-SCNC: 140 MMOL/L — SIGNIFICANT CHANGE UP (ref 135–145)
SP GR SPEC: 1.02 — SIGNIFICANT CHANGE UP (ref 1.01–1.02)
TROPONIN I SERPL-MCNC: <.015 NG/ML — SIGNIFICANT CHANGE UP (ref 0.01–0.04)
UROBILINOGEN FLD QL: NEGATIVE MG/DL — SIGNIFICANT CHANGE UP
WBC # BLD: 12.44 K/UL — HIGH (ref 3.8–10.5)
WBC # FLD AUTO: 12.44 K/UL — HIGH (ref 3.8–10.5)
WBC UR QL: ABNORMAL

## 2020-07-26 PROCEDURE — 99285 EMERGENCY DEPT VISIT HI MDM: CPT

## 2020-07-26 PROCEDURE — 71045 X-RAY EXAM CHEST 1 VIEW: CPT | Mod: 26

## 2020-07-26 PROCEDURE — 74177 CT ABD & PELVIS W/CONTRAST: CPT | Mod: 26

## 2020-07-26 PROCEDURE — 93010 ELECTROCARDIOGRAM REPORT: CPT

## 2020-07-26 RX ORDER — SODIUM CHLORIDE 9 MG/ML
1000 INJECTION INTRAMUSCULAR; INTRAVENOUS; SUBCUTANEOUS ONCE
Refills: 0 | Status: COMPLETED | OUTPATIENT
Start: 2020-07-26 | End: 2020-07-26

## 2020-07-26 RX ORDER — MORPHINE SULFATE 50 MG/1
2 CAPSULE, EXTENDED RELEASE ORAL EVERY 6 HOURS
Refills: 0 | Status: DISCONTINUED | OUTPATIENT
Start: 2020-07-26 | End: 2020-07-28

## 2020-07-26 RX ORDER — SODIUM CHLORIDE 9 MG/ML
1000 INJECTION INTRAMUSCULAR; INTRAVENOUS; SUBCUTANEOUS
Refills: 0 | Status: DISCONTINUED | OUTPATIENT
Start: 2020-07-26 | End: 2020-07-28

## 2020-07-26 RX ORDER — MORPHINE SULFATE 50 MG/1
4 CAPSULE, EXTENDED RELEASE ORAL ONCE
Refills: 0 | Status: DISCONTINUED | OUTPATIENT
Start: 2020-07-26 | End: 2020-07-26

## 2020-07-26 RX ORDER — POTASSIUM CHLORIDE 20 MEQ
20 PACKET (EA) ORAL ONCE
Refills: 0 | Status: COMPLETED | OUTPATIENT
Start: 2020-07-26 | End: 2020-07-26

## 2020-07-26 RX ORDER — PANTOPRAZOLE SODIUM 20 MG/1
40 TABLET, DELAYED RELEASE ORAL DAILY
Refills: 0 | Status: DISCONTINUED | OUTPATIENT
Start: 2020-07-26 | End: 2020-07-28

## 2020-07-26 RX ORDER — ONDANSETRON 8 MG/1
4 TABLET, FILM COATED ORAL EVERY 6 HOURS
Refills: 0 | Status: DISCONTINUED | OUTPATIENT
Start: 2020-07-26 | End: 2020-07-28

## 2020-07-26 RX ADMIN — SODIUM CHLORIDE 1000 MILLILITER(S): 9 INJECTION INTRAMUSCULAR; INTRAVENOUS; SUBCUTANEOUS at 18:07

## 2020-07-26 RX ADMIN — MORPHINE SULFATE 4 MILLIGRAM(S): 50 CAPSULE, EXTENDED RELEASE ORAL at 13:36

## 2020-07-26 RX ADMIN — Medication 20 MILLIEQUIVALENT(S): at 18:04

## 2020-07-26 RX ADMIN — SODIUM CHLORIDE 75 MILLILITER(S): 9 INJECTION INTRAMUSCULAR; INTRAVENOUS; SUBCUTANEOUS at 21:29

## 2020-07-26 RX ADMIN — MORPHINE SULFATE 4 MILLIGRAM(S): 50 CAPSULE, EXTENDED RELEASE ORAL at 15:30

## 2020-07-26 NOTE — H&P ADULT - NSHPPHYSICALEXAM_GEN_ALL_CORE
PHYSICAL EXAM:    GENERAL: NAD, well-groomed, well-developed  HEAD:  Atraumatic, Normocephalic  EYES: EOMI, PERRLA, conjunctiva and sclera clear  ENMT: No tonsillar erythema, exudates, or enlargement; Moist mucous membranes, , No lesions  NECK: Supple, No JVD, Normal thyroid  NERVOUS SYSTEM:  Alert & Oriented X4, ; Motor Strength 5/5 B/L upper and lower extremities; DTRs 2+ intact and symmetric  CHEST/LUNG: Clear  bilaterally; No rales, rhonchi, wheezing, or rubs  HEART: Regular rate and rhythm; No murmurs, rubs, or gallops  ABDOMEN: Soft, Nontender, Nondistended; no  masses Bowel sounds present  EXTREMITIES:  + Peripheral Pulses, No clubbing, cyanosis, or edema  LYMPH: No lymphadenopathy noted   RECTAL: deferred     SKIN: No rashes or lesions

## 2020-07-26 NOTE — ED PROVIDER NOTE - OBJECTIVE STATEMENT
62 y/o F with a PMHx of DM, HTN and PSHx of hysterectomy presents to the ED c/o sudden onset of lower abdominal pain. Pain is described as sharp and shooting associated with non-bilious non-bloody emesis with specks of blood 1 time. Denies dysuria, fever or chills. Patient also reports having 1 small painful bowel movement today. 60 y/o F with a PMHx of DM, HTN and PSHx of hysterectomy presents to the ED c/o sudden onset of lower abdominal pain. Pain is described as sharp and shooting pain associated with non-bilious non-bloody emesis, one episode with specs of blood. No massive bloody emesis or coffee ground emesis. Denies dysuria, fever or chills. Patient also reports having 1 small painful bowel movement today.

## 2020-07-26 NOTE — ED PROVIDER NOTE - NS ED ROS FT
CONST: no fevers, no chills, no trauma  EYES: no pain, no visual disturbances  ENT: no sore throat, no epistaxis, no rhinorrhea, no hearing changes  CV: no chest pain, no palpitations, no orthopnea, no extremity pain or swelling  RESP: no shortness of breath, no cough, no sputum, no pleurisy, no wheezing  ABD: +abdominal pain, no nausea, +vomiting, no diarrhea, no black or bloody stool  : no dysuria, no hematuria, no frequency, no urgency  MSK: no back pain, no neck pain, no extremity pain  NEURO: no headache, no sensory disturbances, no focal weakness, no dizziness  HEME: no easy bleeding or bruising  SKIN: no diaphoresis, no rash

## 2020-07-26 NOTE — ED ADULT NURSE NOTE - NSIMPLEMENTINTERV_GEN_ALL_ED
Implemented All Universal Safety Interventions:  [place] to call system. Call bell, personal items and telephone within reach. Instruct patient to call for assistance. Room bathroom lighting operational. Non-slip footwear when patient is off stretcher. Physically safe environment: no spills, clutter or unnecessary equipment. Stretcher in lowest position, wheels locked, appropriate side rails in place.

## 2020-07-26 NOTE — ED ADULT NURSE NOTE - OBJECTIVE STATEMENT
60 year old female 60 year old female presents with nausea, vomiting several episodes. Vomitus with specks of blood. current vomitus no blood observed. She has a history of hysterectomy and diabetes takes oral medication. She has a sensation pain going to genital area and something coming out of her vagina

## 2020-07-26 NOTE — ED ADULT NURSE REASSESSMENT NOTE - NS ED NURSE REASSESS COMMENT FT1
[id number] Pt received bed # 13 denies vomiting blood at present states feels something coming out between her legs. Pt undressed and examined no prolapse noted.

## 2020-07-26 NOTE — ED PROVIDER NOTE - PHYSICAL EXAMINATION
VITALS: reviewed  GEN: Distress secondary to pain, A & O x 4  HEAD/EYES: NCAT, PERRL, EOMI, anicteric sclerae, no conjunctival pallor  ENT: mucus membranes moist, oropharynx WNL, trachea midline, no JVD  RESP: lungs CTA with equal breath sounds bilaterally, chest wall nontender and atraumatic  CV: heart with reg rhythm S1, S2, no murmur; distal pulses intact and symmetric bilaterally  ABDOMEN: normoactive bowel sounds, soft, abdominal pain with distension, nontender, no palpable masses  : no CVAT  MSK: extremities atraumatic and nontender, no edema, no asymmetry. the back is without midline or lateral tenderness, there is no spinal deformity or stepoff and the back is ranged painlessly. the neck has no midline tenderness, deformity, or stepoff, and is ranged painlessly.  SKIN: warm, dry, no rash, no bruising, no cyanosis. color appropriate for ethnicity  NEURO: alert, mentating appropriately, no facial asymmetry. gross sensation, motor, coordination are intact  PSYCH: Affect appropriate VITALS: reviewed  GEN: Distress secondary to pain, A & O x 4  HEAD/EYES: NCAT, EOMI, anicteric sclerae  ENT: mucus membranes moist, oropharynx WNL, trachea midline  RESP: lungs CTA with equal breath sounds bilaterally, chest wall nontender and atraumatic  CV: heart with reg rhythm S1, S2, distal pulses intact and symmetric bilaterally  ABDOMEN: soft, abdominal pain with diffuse abdominal ttp across lower abdomen with guarding, no palpable masses  : no CVAT  MSK: extremities atraumatic and nontender, no edema, no asymmetry.   SKIN: warm, dry, no rash, no bruising, no cyanosis. color appropriate for ethnicity  NEURO: alert, mentating appropriately, no facial asymmetry.   PSYCH: Affect appropriate

## 2020-07-26 NOTE — H&P ADULT - NSHPLABSRESULTS_GEN_ALL_CORE
LABS:                        13.9   12.44 )-----------( 227      ( 2020 14:08 )             44.1     07-    140  |  107  |  13  ----------------------------<  201<H>  3.3<L>   |  24  |  0.89    Ca    9.1      2020 14:08    TPro  9.1<H>  /  Alb  3.9  /  TBili  0.3  /  DBili  x   /  AST  24  /  ALT  33  /  AlkPhos  84  07-26    PT/INR - ( 2020 14:08 )   PT: 12.7 sec;   INR: 1.15 ratio         PTT - ( 2020 14:08 )  PTT:30.3 sec  Urinalysis Basic - ( 2020 14:10 )    Color: Yellow / Appearance: Clear / S.020 / pH: x  Gluc: x / Ketone: Moderate  / Bili: Negative / Urobili: Negative mg/dL   Blood: x / Protein: 100 mg/dL / Nitrite: Negative   Leuk Esterase: Negative / RBC: 3-5 /HPF / WBC 6-10   Sq Epi: x / Non Sq Epi: Occasional / Bacteria: Few

## 2020-07-26 NOTE — ED PROVIDER NOTE - CLINICAL SUMMARY MEDICAL DECISION MAKING FREE TEXT BOX
Rule out SBO 61 yo F presenting with abdominal pain, pain with defecation, lower abdominal pain, hx surgery. Small, hard, straining BM this morning. N/v. R/o SBO, r/o colitis, r/o diverticulitis. r/o UTI.

## 2020-07-26 NOTE — H&P ADULT - HISTORY OF PRESENT ILLNESS
· HPI Objective Statement: 60 y/o F with a PMHx of DM,  and PSHx of hysterectomy oophorectomy presents to the ED c/o sudden onset of lower abdominal pain. Pain is described as sharp and shooting associated with non-bilious non-bloody emesis with specks of blood 1 time. Denies dysuria, fever or chills. Patient also reports having 1 small painful bowel movement today.   found  to  have  ascites  eneteritis  admitted  for  further w/u  and  vomiting

## 2020-07-27 DIAGNOSIS — Z90.710 ACQUIRED ABSENCE OF BOTH CERVIX AND UTERUS: Chronic | ICD-10-CM

## 2020-07-27 DIAGNOSIS — R10.9 UNSPECIFIED ABDOMINAL PAIN: ICD-10-CM

## 2020-07-27 LAB
ALBUMIN SERPL ELPH-MCNC: 3.4 G/DL — SIGNIFICANT CHANGE UP (ref 3.3–5)
ALP SERPL-CCNC: 71 U/L — SIGNIFICANT CHANGE UP (ref 40–120)
ALT FLD-CCNC: 29 U/L — SIGNIFICANT CHANGE UP (ref 12–78)
ANION GAP SERPL CALC-SCNC: 5 MMOL/L — SIGNIFICANT CHANGE UP (ref 5–17)
AST SERPL-CCNC: 21 U/L — SIGNIFICANT CHANGE UP (ref 15–37)
BILIRUB SERPL-MCNC: 0.3 MG/DL — SIGNIFICANT CHANGE UP (ref 0.2–1.2)
BUN SERPL-MCNC: 10 MG/DL — SIGNIFICANT CHANGE UP (ref 7–23)
CALCIUM SERPL-MCNC: 8.4 MG/DL — LOW (ref 8.5–10.1)
CHLORIDE SERPL-SCNC: 107 MMOL/L — SIGNIFICANT CHANGE UP (ref 96–108)
CO2 SERPL-SCNC: 29 MMOL/L — SIGNIFICANT CHANGE UP (ref 22–31)
CREAT SERPL-MCNC: 0.68 MG/DL — SIGNIFICANT CHANGE UP (ref 0.5–1.3)
CULTURE RESULTS: SIGNIFICANT CHANGE UP
GLUCOSE BLDC GLUCOMTR-MCNC: 140 MG/DL — HIGH (ref 70–99)
GLUCOSE BLDC GLUCOMTR-MCNC: 194 MG/DL — HIGH (ref 70–99)
GLUCOSE SERPL-MCNC: 148 MG/DL — HIGH (ref 70–99)
HCT VFR BLD CALC: 39 % — SIGNIFICANT CHANGE UP (ref 34.5–45)
HCV AB S/CO SERPL IA: 0.13 S/CO — SIGNIFICANT CHANGE UP (ref 0–0.99)
HCV AB SERPL-IMP: SIGNIFICANT CHANGE UP
HGB BLD-MCNC: 12.5 G/DL — SIGNIFICANT CHANGE UP (ref 11.5–15.5)
MCHC RBC-ENTMCNC: 27.7 PG — SIGNIFICANT CHANGE UP (ref 27–34)
MCHC RBC-ENTMCNC: 32.1 GM/DL — SIGNIFICANT CHANGE UP (ref 32–36)
MCV RBC AUTO: 86.5 FL — SIGNIFICANT CHANGE UP (ref 80–100)
NRBC # BLD: 0 /100 WBCS — SIGNIFICANT CHANGE UP (ref 0–0)
PLATELET # BLD AUTO: 196 K/UL — SIGNIFICANT CHANGE UP (ref 150–400)
POTASSIUM SERPL-MCNC: 3.1 MMOL/L — LOW (ref 3.5–5.3)
POTASSIUM SERPL-SCNC: 3.1 MMOL/L — LOW (ref 3.5–5.3)
PROT SERPL-MCNC: 7.5 GM/DL — SIGNIFICANT CHANGE UP (ref 6–8.3)
RBC # BLD: 4.51 M/UL — SIGNIFICANT CHANGE UP (ref 3.8–5.2)
RBC # FLD: 14.5 % — SIGNIFICANT CHANGE UP (ref 10.3–14.5)
SARS-COV-2 IGG SERPL QL IA: POSITIVE
SARS-COV-2 IGM SERPL IA-ACNC: 6.73 INDEX — HIGH
SARS-COV-2 RNA SPEC QL NAA+PROBE: SIGNIFICANT CHANGE UP
SODIUM SERPL-SCNC: 141 MMOL/L — SIGNIFICANT CHANGE UP (ref 135–145)
SPECIMEN SOURCE: SIGNIFICANT CHANGE UP
WBC # BLD: 6.57 K/UL — SIGNIFICANT CHANGE UP (ref 3.8–10.5)
WBC # FLD AUTO: 6.57 K/UL — SIGNIFICANT CHANGE UP (ref 3.8–10.5)

## 2020-07-27 RX ORDER — POTASSIUM CHLORIDE 20 MEQ
40 PACKET (EA) ORAL EVERY 4 HOURS
Refills: 0 | Status: COMPLETED | OUTPATIENT
Start: 2020-07-27 | End: 2020-07-27

## 2020-07-27 RX ORDER — DEXTROSE 50 % IN WATER 50 %
15 SYRINGE (ML) INTRAVENOUS ONCE
Refills: 0 | Status: DISCONTINUED | OUTPATIENT
Start: 2020-07-27 | End: 2020-07-28

## 2020-07-27 RX ORDER — GLUCAGON INJECTION, SOLUTION 0.5 MG/.1ML
1 INJECTION, SOLUTION SUBCUTANEOUS ONCE
Refills: 0 | Status: DISCONTINUED | OUTPATIENT
Start: 2020-07-27 | End: 2020-07-28

## 2020-07-27 RX ORDER — POTASSIUM CHLORIDE 20 MEQ
10 PACKET (EA) ORAL ONCE
Refills: 0 | Status: COMPLETED | OUTPATIENT
Start: 2020-07-27 | End: 2020-07-27

## 2020-07-27 RX ORDER — INSULIN LISPRO 100/ML
VIAL (ML) SUBCUTANEOUS
Refills: 0 | Status: DISCONTINUED | OUTPATIENT
Start: 2020-07-27 | End: 2020-07-28

## 2020-07-27 RX ORDER — DEXTROSE 50 % IN WATER 50 %
25 SYRINGE (ML) INTRAVENOUS ONCE
Refills: 0 | Status: DISCONTINUED | OUTPATIENT
Start: 2020-07-27 | End: 2020-07-28

## 2020-07-27 RX ORDER — DEXTROSE 50 % IN WATER 50 %
12.5 SYRINGE (ML) INTRAVENOUS ONCE
Refills: 0 | Status: DISCONTINUED | OUTPATIENT
Start: 2020-07-27 | End: 2020-07-28

## 2020-07-27 RX ORDER — SODIUM CHLORIDE 9 MG/ML
1000 INJECTION, SOLUTION INTRAVENOUS
Refills: 0 | Status: DISCONTINUED | OUTPATIENT
Start: 2020-07-27 | End: 2020-07-28

## 2020-07-27 RX ADMIN — Medication 40 MILLIEQUIVALENT(S): at 21:06

## 2020-07-27 RX ADMIN — Medication 100 MILLIEQUIVALENT(S): at 18:02

## 2020-07-27 RX ADMIN — PANTOPRAZOLE SODIUM 40 MILLIGRAM(S): 20 TABLET, DELAYED RELEASE ORAL at 05:38

## 2020-07-27 RX ADMIN — Medication 40 MILLIEQUIVALENT(S): at 17:09

## 2020-07-27 RX ADMIN — Medication 2: at 11:46

## 2020-07-27 RX ADMIN — SODIUM CHLORIDE 75 MILLILITER(S): 9 INJECTION INTRAMUSCULAR; INTRAVENOUS; SUBCUTANEOUS at 11:48

## 2020-07-27 RX ADMIN — SODIUM CHLORIDE 75 MILLILITER(S): 9 INJECTION INTRAMUSCULAR; INTRAVENOUS; SUBCUTANEOUS at 23:39

## 2020-07-27 NOTE — DIETITIAN INITIAL EVALUATION ADULT. - ENERGY NEEDS
Height (cm): 170.2 (07-26)  Weight (kg): 113.4 (07-26)  BMI (kg/m2): 39.1 (07-26)  IBW: 61.2 kg          % IBW:  185%           UBW: 113.3 kg             %%

## 2020-07-27 NOTE — CHART NOTE - NSCHARTNOTEFT_GEN_A_CORE
Vascular & Interventional Radiology Brief Consult Note    Evaluate for Procedure: Paracentesis    HPI: 60y Female with enteritis, ascites.    Allergies:   Medications (Abx/Cardiac/Anticoagulation/Blood Products)      Data:  170.2  113.4  T(C): 37  HR: 73  BP: 118/57  RR: 18  SpO2: 95%    -WBC 6.57 / HgB 12.5 / Hct 39.0 / Plt 196  -Na 140 / Cl 107 / BUN 13 / Glucose 201  -K 3.3 / CO2 24 / Cr 0.89  -ALT 33 / Alk Phos 84 / T.Bili 0.3  -INR1.15    Imaging: trace ascites in the pelvis    Assessment/Plan:   -60y Female with enteritis and ascites.  -Ascites is trace in the pelvis, inaccessible. Likely reactive fluid to enteritis. No role for para.

## 2020-07-27 NOTE — CONSULT NOTE ADULT - SUBJECTIVE AND OBJECTIVE BOX
Chief Complaint:  Patient is a 60y old  Female who presents with a chief complaint of enteritis emesis ascites (2020 20:26)      HPI:  · HPI Objective Statement: 62 y/o F with a PMHx of DM,  and PSHx of hysterectomy oophorectomy presents to the ED c/o sudden onset of lower abdominal pain. Pain is described as sharp and shooting associated with non-bilious non-bloody emesis with specks of blood 1 time. Denies dysuria, fever or chills. Patient also reports having 1 small painful bowel movement today.   found  to  have  ascites  eneteritis  admitted  for  further w/u  and  vomiting (2020 20:26) We were asked to evaluate patient for above   no N/V today       PMH/PSH:PAST MEDICAL & SURGICAL HISTORY:  HTN (hypertension)  DM (diabetes mellitus)  History of hysterectomy  s/p EGD /COLONOSCOPY I  h p (-) gastriti s    Allergies:  No Known Allergies      Medications:  dextrose 40% Gel 15 Gram(s) Oral once PRN  dextrose 5%. 1000 milliLiter(s) IV Continuous <Continuous>  dextrose 50% Injectable 12.5 Gram(s) IV Push once  dextrose 50% Injectable 25 Gram(s) IV Push once  dextrose 50% Injectable 25 Gram(s) IV Push once  glucagon  Injectable 1 milliGRAM(s) IntraMuscular once PRN  insulin lispro (HumaLOG) corrective regimen sliding scale   SubCutaneous three times a day before meals  morphine  - Injectable 2 milliGRAM(s) IV Push every 6 hours PRN  ondansetron Injectable 4 milliGRAM(s) IV Push every 6 hours PRN  pantoprazole  Injectable 40 milliGRAM(s) IV Push daily  sodium chloride 0.9%. 1000 milliLiter(s) IV Continuous <Continuous>      Review of Systems:    General:  No weight loss, fevers, chills, night sweats, fatigue,   Eyes:  Good vision, no reported pain  ENT:  No sore throat, pain, runny nose, dysphagia  CV:  No pain, palpitations, hypo/hypertension  Resp:  No dyspnea, cough, tachypnea, wheezing  GI:  +pain, +nausea, + vomiting, No diarrhea, No constipation, No pruritis, No rectal bleeding, No tarry stools, No dysphagia,  :  No pain, bleeding, incontinence, nocturia  Muscle:  No pain, weakness  Breast:  No pain, abscess, mass, discharge  Neuro:  No weakness, tingling, memory problems  Psych:  No fatigue, insomnia, mood problems, depression  Endocrine:  No polyuria, polydipsia, cold/heat intolerance  Heme:  No petechiae, ecchymosis, easy bruisability  Skin:  No rash, tattoos, scars, edema    Relevant Family History:   FAMILY HISTORY:      Relevant Social History: Alcohol ( -) , Tobacco ( -) , Illicit drugs (- )     Physical Exam:    Vital Signs:  Vital Signs Last 24 Hrs  T(C): 38 (2020 10:57), Max: 38 (2020 10:57)  T(F): 100.4 (2020 10:57), Max: 100.4 (2020 10:57)  HR: 75 (2020 10:57) (73 - 86)  BP: 131/66 (2020 10:57) (103/69 - 157/82)  BP(mean): --  RR: 17 (2020 10:57) (16 - 18)  SpO2: 97% (2020 10:57) (93% - 98%)  Daily Height in cm: 170.18 (2020 22:16)    Daily Weight in k.4 (2020 05:43)    General:  Appears stated age, well-groomed, well-nourished, no distress  HEENT:  NC/AT,  conjunctivae clear and pink, no thyromegaly, nodules, adenopathy, no JVD, anicteric sclera  Chest:  Full & symmetric excursion, no increased effort, breath sounds clear  Cardiovascular:  Regular rhythm, S1, S2, no murmur/rub/S3/S4, no abdominal bruit, no edema  Abdomen:  Soft, + mild tender, Obese non distended, normoactive bowel sounds,  no masses , no hepatosplenomegaly, no signs of chronic liver disease  Extremities:  no cyanosis, clubbing or edema  Skin:  No rash/erythema/ecchymoses/petechiae/wounds/abscess/warm/dry  Neuro/Psych:  Alert, oriented, no asterixis, no tremor, no encephalopathy    Laboratory:                          12.5   6.57  )-----------( 196      ( 2020 09:27 )             39.0     07-27    141  |  107  |  10  ----------------------------<  148<H>  3.1<L>   |  29  |  0.68    Ca    8.4<L>      2020 09:27    TPro  7.5  /  Alb  3.4  /  TBili  0.3  /  DBili  x   /  AST  21  /  ALT  29  /  AlkPhos  71  07-27    LIVER FUNCTIONS - ( 2020 09:27 )  Alb: 3.4 g/dL / Pro: 7.5 gm/dL / ALK PHOS: 71 U/L / ALT: 29 U/L / AST: 21 U/L / GGT: x           PT/INR - ( 2020 14:08 )   PT: 12.7 sec;   INR: 1.15 ratio         PTT - ( 2020 14:08 )  PTT:30.3 sec  Urinalysis Basic - ( 2020 14:10 )    Color: Yellow / Appearance: Clear / S.020 / pH: x  Gluc: x / Ketone: Moderate  / Bili: Negative / Urobili: Negative mg/dL   Blood: x / Protein: 100 mg/dL / Nitrite: Negative   Leuk Esterase: Negative / RBC: 3-5 /HPF / WBC 6-10   Sq Epi: x / Non Sq Epi: Occasional / Bacteria: Few        Lipase serum64 U/L<L>       Intake and Output    20 @ 07:  -  20 @ 11:47  --------------------------------------------------------  IN: 580 mL / OUT: 0 mL / NET: 580 mL        Imaging:  EXAM:  CT ABDOMEN AND PELVIS IC                        PROCEDURE DATE:  2020    INTERPRETATION:  Clinical information: Abdominal pain.    Comparison: None    PROCEDURE:  CT of the Abdomen and Pelvis was performed with intravenous contrast.  100 ml of Omnipaque 350 was injected intravenously. None was discarded.  Oral contrast was administered.      FINDINGS:    LOWER CHEST: within normal limits.    ABDOMEN:  LIVER: Diffuse fatty infiltration.  BILE DUCTS: No intrahepatic biliaryductal dilatation. Common bile duct measures up to 1.0 cm.  GALLBLADDER: No calcified gallstones. Normal caliber wall.  PANCREAS: within normal limits.  SPLEEN: within normal limits.  ADRENALS: within normal limits.  KIDNEYS: There is a 3.8 cm left renal cyst.    PELVIS:  REPRODUCTIVE ORGANS: Hysterectomy.  URETERS: within normal limits.  BLADDER: within normal limits.      BOWEL: Colonic diverticulosis. There may be thickening of a loop of small bowel in the pelvis.  PERITONEUM: Trace amount of fluid in the pelvis which is mildly complex as it does not measure simple fluid.  VESSELS: within normal limits.  RETROPERITONEUM: within normal limits.  ABDOMINAL WALL: within normal limits.  BONES: within normal limits.    IMPRESSION:    Possible thickening of a small bowel loop in the pelvis suggesting enteritis.    Trace amount of pelvic ascites which is mildly complex.    No intrahepatic biliary ductal dilatation. Dilatation of the common bile duct to 1.0 cm. Correlation with LFTs recommended.    ZAID STANLEY M.D., ATTENDING RADIOLOGIST  This document has been electronically signed. 2020  5:26PM

## 2020-07-27 NOTE — CONSULT NOTE ADULT - ASSESSMENT
60 y/o F with a PMHx of DM,  and PSHx of hysterectomy oophorectomy abnormal CT scan "enteritis" and mildly dilated CBD of unknown etiology  bur with Normal LFTS t

## 2020-07-27 NOTE — DIETITIAN INITIAL EVALUATION ADULT. - PERTINENT LABORATORY DATA
07-27 Na141 mmol/L Glu 148 mg/dL<H> K+ 3.1 mmol/L<L> Cr  0.68 mg/dL BUN 10 mg/dL 07-27 Alb 3.4 g/dL07-27 ALT 29 U/L AST 21 U/L Alkaline Phosphatase 71 U/L

## 2020-07-27 NOTE — DIETITIAN INITIAL EVALUATION ADULT. - PERTINENT MEDS FT
MEDICATIONS  (STANDING):  dextrose 5%. 1000 milliLiter(s) (50 mL/Hr) IV Continuous <Continuous>  dextrose 50% Injectable 12.5 Gram(s) IV Push once  dextrose 50% Injectable 25 Gram(s) IV Push once  dextrose 50% Injectable 25 Gram(s) IV Push once  insulin lispro (HumaLOG) corrective regimen sliding scale   SubCutaneous three times a day before meals  pantoprazole  Injectable 40 milliGRAM(s) IV Push daily  sodium chloride 0.9%. 1000 milliLiter(s) (75 mL/Hr) IV Continuous <Continuous>    MEDICATIONS  (PRN):  dextrose 40% Gel 15 Gram(s) Oral once PRN Blood Glucose LESS THAN 70 milliGRAM(s)/deciliter  glucagon  Injectable 1 milliGRAM(s) IntraMuscular once PRN Glucose LESS THAN 70 milligrams/deciliter  morphine  - Injectable 2 milliGRAM(s) IV Push every 6 hours PRN Severe Pain (7 - 10)  ondansetron Injectable 4 milliGRAM(s) IV Push every 6 hours PRN Nausea and/or Vomiting

## 2020-07-27 NOTE — DIETITIAN INITIAL EVALUATION ADULT. - OTHER INFO
Pt denied unintentional wt. loss PTA.  Pt did own shopping/cooking.  Diet PTA: CHO controlled, ate 3 meals/day.   Pt c dx of DM x 1 year, was on metformin PTA.  Pt was not self monitoring blood glucose as insurance company did not cover the strips.  Pt stated that A1C=9.3% last week, pt has not seen an Endocrinologist PTA.   Pt was able to state some foods containing CHO, however c some questions on allowable foods in diet.  Pt educated on CHO controlled diet c meal planning c plate method & blood glucose goals.  Directory of ambulatory nutrition services provided.

## 2020-07-28 ENCOUNTER — TRANSCRIPTION ENCOUNTER (OUTPATIENT)
Age: 60
End: 2020-07-28

## 2020-07-28 VITALS
DIASTOLIC BLOOD PRESSURE: 58 MMHG | RESPIRATION RATE: 17 BRPM | OXYGEN SATURATION: 95 % | SYSTOLIC BLOOD PRESSURE: 131 MMHG | TEMPERATURE: 99 F | HEART RATE: 68 BPM

## 2020-07-28 LAB
A1C WITH ESTIMATED AVERAGE GLUCOSE RESULT: 9.1 % — HIGH (ref 4–5.6)
ALBUMIN SERPL ELPH-MCNC: 3.5 G/DL — SIGNIFICANT CHANGE UP (ref 3.3–5)
ALP SERPL-CCNC: 75 U/L — SIGNIFICANT CHANGE UP (ref 40–120)
ALT FLD-CCNC: 35 U/L — SIGNIFICANT CHANGE UP (ref 12–78)
ANION GAP SERPL CALC-SCNC: 5 MMOL/L — SIGNIFICANT CHANGE UP (ref 5–17)
AST SERPL-CCNC: 27 U/L — SIGNIFICANT CHANGE UP (ref 15–37)
BILIRUB SERPL-MCNC: 0.4 MG/DL — SIGNIFICANT CHANGE UP (ref 0.2–1.2)
BUN SERPL-MCNC: 7 MG/DL — SIGNIFICANT CHANGE UP (ref 7–23)
CALCIUM SERPL-MCNC: 8.9 MG/DL — SIGNIFICANT CHANGE UP (ref 8.5–10.1)
CHLORIDE SERPL-SCNC: 107 MMOL/L — SIGNIFICANT CHANGE UP (ref 96–108)
CO2 SERPL-SCNC: 29 MMOL/L — SIGNIFICANT CHANGE UP (ref 22–31)
CREAT SERPL-MCNC: 0.76 MG/DL — SIGNIFICANT CHANGE UP (ref 0.5–1.3)
CRP SERPL-MCNC: 0.8 MG/DL — HIGH (ref 0–0.4)
ESTIMATED AVERAGE GLUCOSE: 214 MG/DL — HIGH (ref 68–114)
GLUCOSE BLDC GLUCOMTR-MCNC: 154 MG/DL — HIGH (ref 70–99)
GLUCOSE BLDC GLUCOMTR-MCNC: 155 MG/DL — HIGH (ref 70–99)
GLUCOSE SERPL-MCNC: 149 MG/DL — HIGH (ref 70–99)
HCT VFR BLD CALC: 42.2 % — SIGNIFICANT CHANGE UP (ref 34.5–45)
HGB BLD-MCNC: 13.2 G/DL — SIGNIFICANT CHANGE UP (ref 11.5–15.5)
MCHC RBC-ENTMCNC: 27.6 PG — SIGNIFICANT CHANGE UP (ref 27–34)
MCHC RBC-ENTMCNC: 31.3 GM/DL — LOW (ref 32–36)
MCV RBC AUTO: 88.1 FL — SIGNIFICANT CHANGE UP (ref 80–100)
NRBC # BLD: 0 /100 WBCS — SIGNIFICANT CHANGE UP (ref 0–0)
PLATELET # BLD AUTO: 198 K/UL — SIGNIFICANT CHANGE UP (ref 150–400)
POTASSIUM SERPL-MCNC: 3.9 MMOL/L — SIGNIFICANT CHANGE UP (ref 3.5–5.3)
POTASSIUM SERPL-SCNC: 3.9 MMOL/L — SIGNIFICANT CHANGE UP (ref 3.5–5.3)
PROT SERPL-MCNC: 7.9 GM/DL — SIGNIFICANT CHANGE UP (ref 6–8.3)
RBC # BLD: 4.79 M/UL — SIGNIFICANT CHANGE UP (ref 3.8–5.2)
RBC # FLD: 14.3 % — SIGNIFICANT CHANGE UP (ref 10.3–14.5)
SODIUM SERPL-SCNC: 141 MMOL/L — SIGNIFICANT CHANGE UP (ref 135–145)
WBC # BLD: 6.12 K/UL — SIGNIFICANT CHANGE UP (ref 3.8–10.5)
WBC # FLD AUTO: 6.12 K/UL — SIGNIFICANT CHANGE UP (ref 3.8–10.5)

## 2020-07-28 RX ORDER — PANTOPRAZOLE SODIUM 20 MG/1
1 TABLET, DELAYED RELEASE ORAL
Qty: 30 | Refills: 0
Start: 2020-07-28 | End: 2020-08-26

## 2020-07-28 RX ORDER — PANTOPRAZOLE SODIUM 20 MG/1
40 TABLET, DELAYED RELEASE ORAL
Refills: 0 | Status: DISCONTINUED | OUTPATIENT
Start: 2020-07-28 | End: 2020-07-28

## 2020-07-28 RX ADMIN — Medication 20 MILLIGRAM(S): at 12:31

## 2020-07-28 RX ADMIN — Medication 20 MILLIGRAM(S): at 05:20

## 2020-07-28 RX ADMIN — Medication 2: at 08:04

## 2020-07-28 NOTE — DISCHARGE NOTE PROVIDER - CARE PROVIDER_API CALL
[place] [address]  Phone: [phone]  Fax: [phone]  Follow Up Time:     Abhinav Ph[place] [address]  Phone: [phone]  Fax: [phone]  Follow Up Time:

## 2020-07-28 NOTE — CHART NOTE - NSCHARTNOTEFT_GEN_A_CORE
7/28/2020  [address], [address]      To Whom It May Concern:    Please excuse Marci Santamaria from work, as she was hospitalized from 7/26/2020-7/28/2020.  May return to work on Monday August 3rd, 2020.  Thank-you.        Sincerely,    Alcon ESPINOZA

## 2020-07-28 NOTE — DISCHARGE NOTE NURSING/CASE MANAGEMENT/SOCIAL WORK - PATIENT PORTAL LINK FT
You can access the FollowMyHealth Patient Portal offered by [facility] by registering at the following website: [email or web address]. By joining [employer]s FollowMyHealth portal, you will also be able to view your health information using other applications (apps) compatible with our system.

## 2020-07-28 NOTE — DISCHARGE NOTE PROVIDER - NSDCCPCAREPLAN_GEN_ALL_CORE_FT
PRINCIPAL DISCHARGE DIAGNOSIS  Diagnosis: Abdominal pain  Assessment and Plan of Treatment:       SECONDARY DISCHARGE DIAGNOSES  Diagnosis: Ascites  Assessment and Plan of Treatment:     Diagnosis: Enteritis  Assessment and Plan of Treatment:

## 2020-07-28 NOTE — DISCHARGE NOTE PROVIDER - NSDCMRMEDTOKEN_GEN_ALL_CORE_FT
dicyclomine 20 mg oral tablet: 1 tab(s) orally 4 times a day (before meals and at bedtime)  metFORMIN 500 mg oral tablet: 1 tab(s) orally 2 times a day  pantoprazole 40 mg oral delayed release tablet: 1 tab(s) orally once a day (before a meal)

## 2020-07-28 NOTE — DISCHARGE NOTE PROVIDER - CARE PROVIDERS DIRECT ADDRESSES
[email or web address]

## 2020-07-28 NOTE — DISCHARGE NOTE PROVIDER - HOSPITAL COURSE
60 y/o F with a PMHx of DM,  and PSHx of hysterectomy oophorectomy presents to the ED c/o sudden onset of lower abdominal pain. Pain is described as sharp and shooting associated with non-bilious non-bloody emesis with specks of blood 1 time. Denies dysuria, fever or chills. Patient also reports having 1 small painful bowel movement today.   found  to  have  ascites  eneteritis  admitted  for  further w/u  and  vomiting (26 Jul 2020 20:26) We were asked to evaluate patient for above   no N/V today

## 2020-07-28 NOTE — PROGRESS NOTE ADULT - SUBJECTIVE AND OBJECTIVE BOX
INTERVAL HPI/OVERNIGHT EVENTS:        REVIEW OF SYSTEMS:  CONSTITUTIONAL:  no  complaints    NECK: No pain or stiffnes  RESPIRATORY: No SOB   CARDIOVASCULAR: No chest pain, palpitations, dizziness,   GASTROINTESTINAL: No abdominal pain. No nausea, vomiting,   NEUROLOGICAL: No headaches, no  blurry  vision no  dizziness  SKIN: No itching,   MUSCULOSKELETAL: No pain    MEDICATION:  dextrose 40% Gel 15 Gram(s) Oral once PRN  dextrose 5%. 1000 milliLiter(s) IV Continuous <Continuous>  dextrose 50% Injectable 12.5 Gram(s) IV Push once  dextrose 50% Injectable 25 Gram(s) IV Push once  dextrose 50% Injectable 25 Gram(s) IV Push once  dicyclomine 20 milliGRAM(s) Oral four times a day before meals  glucagon  Injectable 1 milliGRAM(s) IntraMuscular once PRN  insulin lispro (HumaLOG) corrective regimen sliding scale   SubCutaneous three times a day before meals  morphine  - Injectable 2 milliGRAM(s) IV Push every 6 hours PRN  ondansetron Injectable 4 milliGRAM(s) IV Push every 6 hours PRN  pantoprazole    Tablet 40 milliGRAM(s) Oral before breakfast  sodium chloride 0.9%. 1000 milliLiter(s) IV Continuous <Continuous>    Vital Signs Last 24 Hrs  T(C): 37.1 (2020 10:53), Max: 37.1 (2020 10:53)  T(F): 98.7 (2020 10:53), Max: 98.7 (2020 10:53)  HR: 68 (2020 10:53) (66 - 69)  BP: 131/58 (2020 10:53) (120/67 - 131/58)  BP(mean): --  RR: 17 (2020 10:53) (17 - 17)  SpO2: 95% (2020 10:53) (95% - 97%)    PHYSICAL EXAM:  GENERAL: NAD, well-groomed, well-developed  HEENT : Conjuntivae  clear sclerae anicteric  NECK: Supple, No JVD, Normal thyroid  NERVOUS SYSTEM:  Alert oriented   no  focal  deficits;   CHEST/LUNG: Clear    HEART: Regular rate and rhythm; No murmurs, rubs, or gallops  ABDOMEN: Soft, Nontender, Nondistended; Bowel sounds present  EXTREMITIES:  no  edema no  tenderness  SKIN: No rashes   LABS:                        13.2   6.12  )-----------( 198      ( 2020 08:04 )             42.2     07-    141  |  107  |  7   ----------------------------<  149<H>  3.9   |  29  |  0.76    Ca    8.9      2020 08:04    TPro  7.9  /  Alb  3.5  /  TBili  0.4  /  DBili  x   /  AST  27  /  ALT  35  /  AlkPhos  75  07-28    PT/INR - ( 2020 14:08 )   PT: 12.7 sec;   INR: 1.15 ratio         PTT - ( 2020 14:08 )  PTT:30.3 sec  Urinalysis Basic - ( 2020 14:10 )    Color: Yellow / Appearance: Clear / S.020 / pH: x  Gluc: x / Ketone: Moderate  / Bili: Negative / Urobili: Negative mg/dL   Blood: x / Protein: 100 mg/dL / Nitrite: Negative   Leuk Esterase: Negative / RBC: 3-5 /HPF / WBC 6-10   Sq Epi: x / Non Sq Epi: Occasional / Bacteria: Few      CAPILLARY BLOOD GLUCOSE      POCT Blood Glucose.: 154 mg/dL (2020 10:42)  POCT Blood Glucose.: 155 mg/dL (2020 07:32)  POCT Blood Glucose.: 140 mg/dL (2020 21:07)  POCT Blood Glucose.: 194 mg/dL (2020 11:44)      RADIOLOGY & ADDITIONAL TESTS:    Imaging reports  Personally Reviewed:  [ ] YES  [ ] NO    Consultant(s) Notes Reviewed:  [ x] YES  [ ] NO    Care Discussed with Consultants/Other Providers [x ] YES  [ ] NO  Assessment and Plan:   Problem/Plan - 1:  ·  Problem: Abdominal pain.  Plan: ivf  antalgics discharge  home  to  follow  with  pmd      Problem/Plan - 2:  ·  Problem: Ascites.  Plan: IR  eval.     Problem/Plan - 3:  ·  Problem: Enteritis.  resolved Plan:  discharge  home

## 2020-07-31 DIAGNOSIS — R10.9 UNSPECIFIED ABDOMINAL PAIN: ICD-10-CM

## 2020-07-31 DIAGNOSIS — A08.4 VIRAL INTESTINAL INFECTION, UNSPECIFIED: ICD-10-CM

## 2020-07-31 DIAGNOSIS — Z79.84 LONG TERM (CURRENT) USE OF ORAL HYPOGLYCEMIC DRUGS: ICD-10-CM

## 2020-07-31 DIAGNOSIS — R18.8 OTHER ASCITES: ICD-10-CM

## 2020-08-14 NOTE — ED PROVIDER NOTE - CHIEF COMPLAINT
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

## 2020-12-23 ENCOUNTER — EMERGENCY (EMERGENCY)
Facility: HOSPITAL | Age: 60
LOS: 1 days | Discharge: HOME | End: 2020-12-23
Attending: EMERGENCY MEDICINE | Admitting: EMERGENCY MEDICINE
Payer: COMMERCIAL

## 2020-12-23 ENCOUNTER — EMERGENCY (EMERGENCY)
Facility: HOSPITAL | Age: 60
LOS: 0 days | Discharge: ANOTHER HOSPITAL | End: 2020-12-23
Attending: STUDENT IN AN ORGANIZED HEALTH CARE EDUCATION/TRAINING PROGRAM
Payer: COMMERCIAL

## 2020-12-23 VITALS
OXYGEN SATURATION: 97 % | DIASTOLIC BLOOD PRESSURE: 68 MMHG | TEMPERATURE: 98 F | SYSTOLIC BLOOD PRESSURE: 108 MMHG | HEIGHT: 67 IN | RESPIRATION RATE: 16 BRPM | HEART RATE: 79 BPM

## 2020-12-23 VITALS
SYSTOLIC BLOOD PRESSURE: 137 MMHG | OXYGEN SATURATION: 97 % | HEART RATE: 75 BPM | RESPIRATION RATE: 17 BRPM | DIASTOLIC BLOOD PRESSURE: 71 MMHG

## 2020-12-23 VITALS
HEIGHT: 67 IN | SYSTOLIC BLOOD PRESSURE: 163 MMHG | TEMPERATURE: 98 F | DIASTOLIC BLOOD PRESSURE: 82 MMHG | OXYGEN SATURATION: 96 % | RESPIRATION RATE: 17 BRPM | HEART RATE: 83 BPM | WEIGHT: 250 LBS

## 2020-12-23 DIAGNOSIS — Z98.891 HISTORY OF UTERINE SCAR FROM PREVIOUS SURGERY: Chronic | ICD-10-CM

## 2020-12-23 DIAGNOSIS — T17.228A FOOD IN PHARYNX CAUSING OTHER INJURY, INITIAL ENCOUNTER: ICD-10-CM

## 2020-12-23 DIAGNOSIS — Z90.710 ACQUIRED ABSENCE OF BOTH CERVIX AND UTERUS: ICD-10-CM

## 2020-12-23 DIAGNOSIS — Z87.59 PERSONAL HISTORY OF OTHER COMPLICATIONS OF PREGNANCY, CHILDBIRTH AND THE PUERPERIUM: ICD-10-CM
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
## 2020-12-23 DIAGNOSIS — Z90.710 ACQUIRED ABSENCE OF BOTH CERVIX AND UTERUS: Chronic | ICD-10-CM

## 2020-12-23 DIAGNOSIS — E11.9 TYPE 2 DIABETES MELLITUS WITHOUT COMPLICATIONS: ICD-10-CM

## 2020-12-23 DIAGNOSIS — Z79.84 LONG TERM (CURRENT) USE OF ORAL HYPOGLYCEMIC DRUGS: ICD-10-CM

## 2020-12-23 DIAGNOSIS — Z20.828 CONTACT WITH AND (SUSPECTED) EXPOSURE TO OTHER VIRAL COMMUNICABLE DISEASES: ICD-10-CM

## 2020-12-23 DIAGNOSIS — K21.9 GASTRO-ESOPHAGEAL REFLUX DISEASE WITHOUT ESOPHAGITIS: ICD-10-CM

## 2020-12-23 DIAGNOSIS — I10 ESSENTIAL (PRIMARY) HYPERTENSION: ICD-10-CM

## 2020-12-23 LAB
ALBUMIN SERPL ELPH-MCNC: 3.9 G/DL — SIGNIFICANT CHANGE UP (ref 3.3–5)
ALP SERPL-CCNC: 89 U/L — SIGNIFICANT CHANGE UP (ref 40–120)
ALT FLD-CCNC: 25 U/L — SIGNIFICANT CHANGE UP (ref 12–78)
ANION GAP SERPL CALC-SCNC: 6 MMOL/L — SIGNIFICANT CHANGE UP (ref 5–17)
APTT BLD: 46.4 SEC — HIGH (ref 27.5–35.5)
AST SERPL-CCNC: 15 U/L — SIGNIFICANT CHANGE UP (ref 15–37)
BASOPHILS # BLD AUTO: 0.05 K/UL — SIGNIFICANT CHANGE UP (ref 0–0.2)
BASOPHILS NFR BLD AUTO: 0.6 % — SIGNIFICANT CHANGE UP (ref 0–2)
BILIRUB SERPL-MCNC: 0.2 MG/DL — SIGNIFICANT CHANGE UP (ref 0.2–1.2)
BUN SERPL-MCNC: 14 MG/DL — SIGNIFICANT CHANGE UP (ref 7–23)
CALCIUM SERPL-MCNC: 9.4 MG/DL — SIGNIFICANT CHANGE UP (ref 8.5–10.1)
CHLORIDE SERPL-SCNC: 102 MMOL/L — SIGNIFICANT CHANGE UP (ref 96–108)
CO2 SERPL-SCNC: 31 MMOL/L — SIGNIFICANT CHANGE UP (ref 22–31)
CREAT SERPL-MCNC: 1.01 MG/DL — SIGNIFICANT CHANGE UP (ref 0.5–1.3)
EOSINOPHIL # BLD AUTO: 0.06 K/UL — SIGNIFICANT CHANGE UP (ref 0–0.5)
EOSINOPHIL NFR BLD AUTO: 0.7 % — SIGNIFICANT CHANGE UP (ref 0–6)
GLUCOSE SERPL-MCNC: 169 MG/DL — HIGH (ref 70–99)
HCT VFR BLD CALC: 43.6 % — SIGNIFICANT CHANGE UP (ref 34.5–45)
HGB BLD-MCNC: 13.8 G/DL — SIGNIFICANT CHANGE UP (ref 11.5–15.5)
IMM GRANULOCYTES NFR BLD AUTO: 0.1 % — SIGNIFICANT CHANGE UP (ref 0–1.5)
INR BLD: 1.03 RATIO — SIGNIFICANT CHANGE UP (ref 0.88–1.16)
LYMPHOCYTES # BLD AUTO: 4.96 K/UL — HIGH (ref 1–3.3)
LYMPHOCYTES # BLD AUTO: 57 % — HIGH (ref 13–44)
MCHC RBC-ENTMCNC: 27.5 PG — SIGNIFICANT CHANGE UP (ref 27–34)
MCHC RBC-ENTMCNC: 31.7 GM/DL — LOW (ref 32–36)
MCV RBC AUTO: 86.9 FL — SIGNIFICANT CHANGE UP (ref 80–100)
MONOCYTES # BLD AUTO: 0.56 K/UL — SIGNIFICANT CHANGE UP (ref 0–0.9)
MONOCYTES NFR BLD AUTO: 6.4 % — SIGNIFICANT CHANGE UP (ref 2–14)
NEUTROPHILS # BLD AUTO: 3.06 K/UL — SIGNIFICANT CHANGE UP (ref 1.8–7.4)
NEUTROPHILS NFR BLD AUTO: 35.2 % — LOW (ref 43–77)
NRBC # BLD: 0 /100 WBCS — SIGNIFICANT CHANGE UP (ref 0–0)
PLATELET # BLD AUTO: 245 K/UL — SIGNIFICANT CHANGE UP (ref 150–400)
POTASSIUM SERPL-MCNC: 3.7 MMOL/L — SIGNIFICANT CHANGE UP (ref 3.5–5.3)
POTASSIUM SERPL-SCNC: 3.7 MMOL/L — SIGNIFICANT CHANGE UP (ref 3.5–5.3)
PROT SERPL-MCNC: 8.6 GM/DL — HIGH (ref 6–8.3)
PROTHROM AB SERPL-ACNC: 11.9 SEC — SIGNIFICANT CHANGE UP (ref 10.6–13.6)
RAPID RVP RESULT: SIGNIFICANT CHANGE UP
RBC # BLD: 5.02 M/UL — SIGNIFICANT CHANGE UP (ref 3.8–5.2)
RBC # FLD: 14.3 % — SIGNIFICANT CHANGE UP (ref 10.3–14.5)
SARS-COV-2 RNA SPEC QL NAA+PROBE: SIGNIFICANT CHANGE UP
SODIUM SERPL-SCNC: 139 MMOL/L — SIGNIFICANT CHANGE UP (ref 135–145)
WBC # BLD: 8.7 K/UL — SIGNIFICANT CHANGE UP (ref 3.8–10.5)
WBC # FLD AUTO: 8.7 K/UL — SIGNIFICANT CHANGE UP (ref 3.8–10.5)

## 2020-12-23 PROCEDURE — 99285 EMERGENCY DEPT VISIT HI MDM: CPT

## 2020-12-23 PROCEDURE — 99283 EMERGENCY DEPT VISIT LOW MDM: CPT

## 2020-12-23 RX ORDER — METFORMIN HYDROCHLORIDE 850 MG/1
1 TABLET ORAL
Qty: 0 | Refills: 0 | DISCHARGE

## 2020-12-23 RX ORDER — DEXAMETHASONE 0.5 MG/5ML
4 ELIXIR ORAL ONCE
Refills: 0 | Status: COMPLETED | OUTPATIENT
Start: 2020-12-23 | End: 2020-12-23

## 2020-12-23 RX ORDER — LIDOCAINE 4 G/100G
10 CREAM TOPICAL ONCE
Refills: 0 | Status: COMPLETED | OUTPATIENT
Start: 2020-12-23 | End: 2020-12-23

## 2020-12-23 RX ADMIN — Medication 4 MILLIGRAM(S): at 18:57

## 2020-12-23 RX ADMIN — LIDOCAINE 10 MILLILITER(S): 4 CREAM TOPICAL at 18:57

## 2020-12-23 NOTE — ED PROVIDER NOTE - NSFOLLOWUPINSTRUCTIONS_ED_ALL_ED_FT
[facility] - ENT  Otolaryngology (ENT)  [address] [place] [address]  Phone: [phone]  Fax:     Reggie Su  OTOLARYNGOLOGY  [address], [address]  [place] [address]  Phone: [phone]  Fax: [phone]    Foreign Body Ingestion    WHAT YOU NEED TO KNOW:    A foreign body is an object you swallowed. Examples include dental work and button batteries. A foreign body can cause problems as it moves through your digestive system.    DISCHARGE INSTRUCTIONS:    Return to the emergency department if:    You have a fever.    You have severe abdominal pain, nausea, or vomiting.    Your vomit or saliva is bloody.    Your bowel movements are black or bloody.  Contact your healthcare provider if:    You do not find the object in your bowel movement within 2 or 3 days.    You do not want to eat because of abdominal pain or vomiting.    You are drooling or hoarse.    You have questions or concerns about your condition or care.  Look for the object in your bowel movements: Search for the dental work, battery, or other small, smooth object each time you have a bowel movement. Do not use laxatives or stool softeners. Do not force yourself to vomit.    Prevent another foreign body ingestion:    Cut your food into small pieces. Chew your food well before you swallow.    Make sure dentures or other dental fixtures are secure. You may need to go to the dentist to have dental work repaired.  Follow up with your healthcare provider as directed: You may need to return for x-rays or other tests. Write down your questions so you remember to ask them during your visits.

## 2020-12-23 NOTE — ED PROVIDER NOTE - PROGRESS NOTE DETAILS
Spoke w/ ENT (Dr Su) at [facility], transfer pt, will obtain CT imaging at [facility] and likely scope in ED for FB removal. Pt updated to plan.

## 2020-12-23 NOTE — ED PROVIDER NOTE - OBJECTIVE STATEMENT
59 yo F w/PMH of HTN, GERD, DM, presents to the ED for foreign body in throat. Pt was having fish for dinner around 5PM and felt a bone get stuck in her throat. Still feels the bone lodged in her throat. Denies fever/chills, dysphagia, SOB, CP, abdominal pain or N/V/D.   PMH as above, PSH hysterectomy, . NKDA. 59yo F w/ PMH of HTN, GERD, DM, presents to the ED for foreign body in throat. Pt was having fish for dinner around 5PM and felt a bone get stuck in her throat. Still feels the bone lodged in her throat, points to R supraclavicular area. Pt reports no difficulty w/ breathing, talking, swallowing. Endorses sore sensation w/ swallowing. ROS otherwise neg:  Denies fever/chills, CP, cough, abdominal pain or N/V/D.     PMH as above, PSH hysterectomy, . NKDA. Meds as listed.

## 2020-12-23 NOTE — ED ADULT NURSE NOTE - OBJECTIVE STATEMENT
Patient A&Ox3. Patient eating dinner around 5pm and has a fish bone stuck in her throat. every time she swallows, it's sore. No SOB, chest pain, N/V/D. patient states the bone is on her right side. The fish bone could not be visualized upon inspection.  hx: hysterectomy - 2 years ago

## 2020-12-23 NOTE — ED PROVIDER NOTE - CLINICAL SUMMARY MEDICAL DECISION MAKING FREE TEXT BOX
61 yo female with a PMHx of T2DM, s/p hysterectomy and oophorectomy presenting as transfer from [facility] for ENT evaluation for fishbone stuck in oropharynx. Patient w/ visible 1cm fish bone protruding from right tonsil. ENT consult placed. Anticipate d/c home or admit for OR if ENT cannot remove bone at bedside.

## 2020-12-23 NOTE — CHART NOTE - NSCHARTNOTEFT_GEN_A_CORE
[facility]  Head & Neck Surgery Procedure Note      Name:                   Marci Santamaria 	       Surgeon:        Reggie Su MD  			  Date of procedure: 12/23/2020	      Assistant:        None    Record Number:	  [id number]                     Anesthesia       Topical Anesthesia       Preoperative diagnosis:	Dysphagia, unspecified (R13.10), Foreign Body Larynx (T17.308A )  	  Postoperative diagnosis:	Dysphagia, unspecified (R13.10), Foreign Body Larynx (T17.308A )    Procedure:		Flexible Fiberoptic Laryngoscopy with removal of Foreign Body (05371)    INDICATION:  The patient is a 60 year old female with a past medical history significant for T2DM, s/p hysterectomy and oophorectomy who presents to the emergency room at [facility] with a chief complaint of throat pain for the past several hours. Patient had yue fish at 5pm day of presentation when she felt the fish bone becoming stuck on the right side of her throat, causing 10/10 throat pain and feeling of a "needle being stuck in her throat." Patient also with complaints of facial/sinus headaches and postnasal drip.  +odynophagia, no nausea/vomiting/drooling/trismus/cp/sob or pooling of secretions.     PROCEDURE: The patient was seen and her bilateral nasal cavities were prepped and sprayed with topical anesthesia of neosynephrine.   Following this, a fiberoptic flexible laryngoscope was passed into the left nasal cavity, and then slowly and meticulously moved posteriorly to the nasopharynx.  There was no evidence of blood within the left anterior and posterior superior lateral nasal wall. There was clear mucous within the nasal cavity.  Once at nasopharynx the skull base and lateral walls of the eustachian tubes were examined for lesions and masses.  There was no blood or masses within the nasopharynx. There was mild prominence of the nasopharyngeal soft tissue consistent with inflammatory reaction.  The fiberoptic endoscope was then carefully directed inferiorly and moved to the hypopharynx and glottis.  There was no fullness of the base of tongue.  There was 1-2+ prominence of the tonsils bilaterally (equally) and without exudate. There was no evidence of retropharyngeal erythema or edema.  There was mild  diffuse erythema  of the pharyngeal wall and supraglottic structure consistent with GERD.  There was a foreign body visualized in the glottic opening.  Next, using laryngscopic guidance, a long tonsil clamp was inserted into the patients oral cavity and slowly moved posteriorly and inferiorly into the hypopharynx and glottis. At the level of the glottis, the foreign body was visualized with laryngoscopic guidance, and the foreign body was then grasped with the clamp and removed.  There was no evidence of any residual foreign body.  The true vocal cords appeared to be mobile bilaterally.  The airway was widely patent. The patient tolerated the procedure well. [facility]  Head & Neck Surgery Procedure Note      Name:                   SantamariaMarci dean 	       Surgeon:        Reggie Su MD  			  Date of procedure: 12/23/2020	      Assistant:        None    Record Number:	  [id number]                     Anesthesia       Topical Anesthesia       Preoperative diagnosis:	Dysphagia, unspecified (R13.10), Foreign Body Larynx (T17.308A )  	  Postoperative diagnosis:	Dysphagia, unspecified (R13.10), Foreign Body Larynx (T17.308A )    Procedure:		Flexible Fiberoptic Laryngoscopy with removal of Foreign Body (76568)    **Reason for Flexible Fiberoptic Laryngoscopy: Patient unable to cooperate with indirect mirror laryngoscopy due to gag reflex**    INDICATION:  The patient is a 60 year old female with a past medical history significant for T2DM, s/p hysterectomy and oophorectomy who presents to the emergency room at [facility] with a chief complaint of throat pain for the past several hours. Patient had yue fish at 5pm day of presentation when she felt the fish bone becoming stuck on the right side of her throat, causing 10/10 throat pain and feeling of a "needle being stuck in her throat." Patient also with complaints of facial/sinus headaches and postnasal drip.  +odynophagia, no nausea/vomiting/drooling/trismus/cp/sob or pooling of secretions.     PROCEDURE: The patient was seen and her bilateral nasal cavities were prepped and sprayed with topical anesthesia of neosynephrine.   Following this, a fiberoptic flexible laryngoscope was passed into the left nasal cavity, and then slowly and meticulously moved posteriorly to the nasopharynx.  There was no evidence of blood within the left anterior and posterior superior lateral nasal wall. There was clear mucous within the nasal cavity.  Once at nasopharynx the skull base and lateral walls of the eustachian tubes were examined for lesions and masses.  There was no blood or masses within the nasopharynx. There was mild prominence of the nasopharyngeal soft tissue consistent with inflammatory reaction.  The fiberoptic endoscope was then carefully directed inferiorly and moved to the hypopharynx and glottis.  There was no fullness of the base of tongue.  There was 1-2+ prominence of the tonsils bilaterally (equally) and without exudate. There was no evidence of retropharyngeal erythema or edema.  There was mild  diffuse erythema  of the pharyngeal wall and supraglottic structure consistent with GERD.  There was a foreign body visualized in the glottic opening.  Next, using laryngscopic guidance, a long tonsil clamp was inserted into the patients oral cavity and slowly moved posteriorly and inferiorly into the hypopharynx and glottis. At the level of the glottis, the foreign body was visualized with laryngoscopic guidance, and the foreign body was then grasped with the clamp and removed.  There was no evidence of any residual foreign body.  The true vocal cords appeared to be mobile bilaterally.  The airway was widely patent. The patient tolerated the procedure well.

## 2020-12-23 NOTE — ED PROVIDER NOTE - NS ED ROS FT
No fever/chills, No photophobia/eye pain/changes in vision, No ear pain/sore throat/dysphagia, No chest pain/palpitations, no SOB/cough/wheeze/stridor, No abdominal pain, No N/V/D, no dysuria/frequency/discharge, No neck/back pain, no rash, no changes in neurological status/function. No fever/chills, No photophobia/eye pain/changes in vision, No ear pain, + sore throat / FB sensation, No chest pain/palpitations, no SOB/cough/wheeze/stridor, No abdominal pain, No N/V/D, no dysuria/frequency/discharge, No neck/back pain, no rash, no changes in neurological status/function.

## 2020-12-23 NOTE — ED PROVIDER NOTE - OBJECTIVE STATEMENT
59 yo female with a PMHx of T2DM, s/p hysterectomy and oophorectomy presenting as transfer from  ED for aspiration of fishbone into airway. Patient had yue fish at 5pm day of presentation when she felt the fish bone becoming stuck on the right side of her throat, causing 10/10 throat pain and feeling of a "needle being stuck in her throat." +odynophagia, no nausea/vomiting/drooling/trismus/cp/sob or pooling of secretions. Labs from  reviewed, wnl.

## 2020-12-23 NOTE — ED PROVIDER NOTE - PATIENT PORTAL LINK FT
You can access the FollowMyHealth Patient Portal offered by [facility] by registering at the following website: [email or web address]. By joining [facility]’s FollowMyHealth portal, you will also be able to view your health information using other applications (apps) compatible with our system.

## 2020-12-23 NOTE — ED ADULT NURSE NOTE - CHIEF COMPLAINT QUOTE
pt arriving from [place] as transfer for ENT consult. Pt states she ate fish today and has fish bone stuck in troat. Airway patent. RR even and unlabored. Denies SOB, drooling, CP. Appears comfortable. PMH DM2.

## 2020-12-23 NOTE — ED ADULT NURSE NOTE - OBJECTIVE STATEMENT
Pt received to room 26 as tranfer from  for ENT consult c/o fishbone in throat x 1 day. AxOx4 and ambulatory at baseline. Pt states she was eating and got fishbone lodged in throat, c/o R sided throat pain when swallowing. Pt able to swallow, speak in clear/complete sentences, denies any dyspnea. PT appears comfortable, in NAD, respirations even/unlabored, VSS. Pt denies headache, dizziness, chest pain, SOB, nausea, vomiting, diarrhea, fever, chills, cough. 20G IV noted to R arm placed at VS. MD at bedside for eval, awaiting MD orders, will continue to monitor.

## 2020-12-23 NOTE — ED PROVIDER NOTE - PROGRESS NOTE DETAILS
Patient s/p Flexible Fiberoptic Laryngoscopy with removal of Foreign Body with resolution of pain. Plan to d/c home.

## 2020-12-23 NOTE — ED ADULT TRIAGE NOTE - CHIEF COMPLAINT QUOTE
pt arriving from [place] as transfer for ENT consult. Pt states she ate fish today and has fish bone stuck in troat. Airway patent. RR even and unlabored. Denies SOB, drooling, CP. Appears comfortable. PMH DM2.

## 2020-12-23 NOTE — CONSULT NOTE ADULT - ENMT COMMENTS
Flexible Fiberoptic Laryngoscopy: clear nasopharynx to glottis, +right pharyngeal foreign body, tvc mobile b/l,  airway patent

## 2020-12-23 NOTE — CONSULT NOTE ADULT - SUBJECTIVE AND OBJECTIVE BOX
HPI: The patient is a 60 year old female with a past medical history significant for T2DM, s/p hysterectomy and oophorectomy who presents to the emergency room at [facility] with a chief complaint of throat pain for the past several hours. Patient had yue fish at 5pm day of presentation when she felt the fish bone becoming stuck on the right side of her throat, causing 10/10 throat pain and feeling of a "needle being stuck in her throat." Patient also with complaints of facial/sinus headaches and postnasal drip.  +odynophagia, no nausea/vomiting/drooling/trismus/cp/sob or pooling of secretions.     HIV:    HIV Test Questions:  · In accordance with [place] State law, we offer every patient who comes to our ED an HIV test. Would you like to be tested today?	Previously Declined (within the last year)    PAST MEDICAL/SURGICAL/FAMILY/SOCIAL HISTORY:    Past Medical History:  DM (diabetes mellitus)    GERD (gastroesophageal reflux disease)    HTN (hypertension).     Past Surgical History:  H/O:     History of hysterectomy.     Family History:  No pertinent family history in first degree relatives.    · Is Pregnant: No     Alcohol Use History:  · Have you ever consumed alcohol	unknown     Tobacco Usage:  · Tobacco Usage	Unknown if ever smoked    ALLERGIES AND HOME MEDICATIONS:   Allergies:        Allergies:  	No Known Allergies:     Home Medications:   * Patient Currently Takes Medications as of 23-Dec-2020 18:14 documented in Structured Notes  · 	Jardiance:

## 2020-12-23 NOTE — CONSULT NOTE ADULT - ASSESSMENT
Assessment:  The patient is a 60 year old female with a past medical history significant for T2DM, s/p hysterectomy and oophorectomy who presents to the emergency room at [facility] with a chief complaint of throat pain for the past several hours.  Ddx: GERD and right pharygneal/glottis foreign body    Plan:  1) removal of pharyngeal/glottic foreign body at bedside under laryngoscopic guidance  2) diet as tolerated

## 2020-12-23 NOTE — ED PROVIDER NOTE - PHYSICAL EXAMINATION
General: ederly female in NAD on RA   HEENT: NCAT.  PERRL.  EOMI.  No scleral icterus or injection.  Moist MM.  No oropharyngeal exudates. ~1cm white fisbone noted protruding from right tonsil, right tonsilar enlargement compared to left noted, no tonsilar erythema, no pooling of secretions or trismus noted   Heart: RRR.  Normal S1 and S2.  No murmurs, rubs, or gallops.   Lungs: CTAB. No wheezes, crackles, or rhonchi. Breathing on RA unlabored.   Abdomen: BS+, soft, NT/ND.  No organomegaly.  Skin: Warm and dry.  No rashes.  Extremities: No edema, clubbing, or cyanosis.  2+ peripheral pulses b/l.  Neuro: A&Ox3.  CN II-XII intact.

## 2020-12-23 NOTE — CONSULT NOTE ADULT - NOSE
nasal/sinus endoscopy: maxillary sinus with mucoid fluid b/l via inferior meatus./inflamed mucosa/congestion

## 2020-12-23 NOTE — ED ADULT NURSE REASSESSMENT NOTE - NS ED NURSE REASSESS COMMENT FT1
ENT removed intact fishbone from pt's throat at bedside. Pt appears comfortable, in relief, in NAD, respirations even/unlabored, VS reassessed by PCA. Pt discharged and awaiting dc papers at this time.

## 2020-12-23 NOTE — CONSULT NOTE ADULT - COMMENTS
Vital Signs Last 24 Hrs  T(C): 36.7 (23 Dec 2020 21:37), Max: 36.8 (23 Dec 2020 17:54)  T(F): 98 (23 Dec 2020 21:37), Max: 98.3 (23 Dec 2020 17:54)  HR: 77 (23 Dec 2020 21:37) (77 - 83)  BP: 126/55 (23 Dec 2020 21:37) (108/68 - 163/82)  BP(mean): --  RR: 18 (23 Dec 2020 21:37) (16 - 18)  SpO2: 99% (23 Dec 2020 21:37) (96% - 99%)

## 2020-12-23 NOTE — ED PROVIDER NOTE - ATTENDING CONTRIBUTION TO CARE
· Chief Complaint: The patient is a 60y Female complaining of foreign body throat.  · HPI Objective Statement: 59 yo F w/PMH of HTN, GERD, DM, presents to the ED for foreign body in throat. Pt was having fish for dinner around 5PM and felt a bone get stuck in her throat. Still feels the bone lodged in her throat. Denies fever/chills, dysphagia, SOB, CP, abdominal pain or N/V/D.   PMH as above, PSH hysterectomy, . NKDA. I have seen and examined the patient on the patient´s visit date. I have reviewed the note written by the resident Bre Coy(Resident),on that visit day. I have supervised participated and duplicated and confirmed all aspects of the physical exam as necessary, I performed all procedures indicated for patient management and was available at all phases of the patient´s visit when needed. We discussed the history, physical exam findings, management plan, and  medical decision making. I have made my additions, exceptions, and revisions within the chart and I agree with H and P as documented in its entirety. The data and my interpretation of any data collected from labs, interventions and imaging appear below as well as my independent medical decision making and considerations    The patient is a 60y Female who has a past medical and surgical history of HTN DM GERD History of hysterectomy and  PTED as a transfer accepted by Dr Su from [facility] with visible fishbone in throat which she accidentally ingested around 5PM today. No fever stridor drooling or dysphonia; +odynophagia  Vital Signs Last 24 Hrs  T(F): 98 HR: 77 BP: 126/55 RR: 18 SpO2: 99% (23 Dec 2020 21:37)  PE: as described; my additions and exceptions are noted in the chart  IMP: FB throat=fishbone  ENT aware   to consult and remove FB  f/u prn

## 2020-12-23 NOTE — CHART NOTE - NSCHARTNOTEFT_GEN_A_CORE
[facility]  Head & Neck Surgery Procedure Note    Name:                   Marci Santamaria 	       Surgeon:        Reggie Su MD  			  Date of procedure: 12/23/2020	      Assistant:        None    Record Number:	  [id number]                     Anesthesia       Topical Anesthesia     Preoperative diagnosis:	Acute maxillary sinusitis, unspecified (J01.00);  Acute Pansinusitis (J01.40)    Postoperative diagnosis:	Same    Procedure:	              Bilateral maxillary sinus endoscopy  (25458)    INDICATION:  The patient is a 60 year old female with a past medical history significant for T2DM, s/p hysterectomy and oophorectomy who presents to the emergency room at [facility] with a chief complaint of throat pain for the past several hours. Patient had yue fish at 5pm day of presentation when she felt the fish bone becoming stuck on the right side of her throat, causing 10/10 throat pain and feeling of a "needle being stuck in her throat." Patient also with complaints of facial/sinus headaches and postnasal drip.  +odynophagia, no nausea/vomiting/drooling/trismus/cp/sob or pooling of secretions.     PROCEDURE:  The patient was seen and her nasal cavities were prepped and sprayed with topical neosynephrine anesthesia.   Following this, a zero degree flexible endoscope was passed into the left nasal vault, and passed posteriorly to the nasopharynx.  There was no evidence of any blood emanating from the left posterior superior lateral nasal wall. The scope was then slowly withdrawn and then rotated superiorly to visualize the middle turbinate and the inferior meatus and osteomeatal complex. The OMC on the left side appeared patent with no evidence of pus or obstruction.  The Maxillary sinus on the left side was then accessed through the inferior meatus.  The maxillary sinus had mild to moderate amount of mucoserous fluid within it without any evidence of masses or lesions.  The frontal duct was then inspected and appeared clear without any mucoid material flowing from it.  The Septum appeared straight.  The scope was then slowly withdrawn.  The zero degree endoscope was then introduced into the right nasal cavity and passed posteriorly to the nasopharynx. There were no masses visible.  There was no evidence of any bleeding emanating from the right posterior septum.  The endoscope was then rotated superiorly to visualize the middle turbinate and the inferior meatus and osteomeatal complex. The Maxillary sinus on the right side was then accessed via the inferior meatus.  There was a minimal amount of mucoserous fluid within the maxillary sinus with no evidence of any masses or pus.  Again the septum appeared to be straight.  The scope was then withdrawn.  The patient tolerated the procedure well.

## 2020-12-23 NOTE — CONSULT NOTE ADULT - ENT GEN HX ROS MEA POS PC
sinus symptoms/nasal congestion/nasal discharge/nasal obstruction/post-nasal discharge/throat pain/dysphagia

## 2020-12-23 NOTE — ED PROVIDER NOTE - CLINICAL SUMMARY MEDICAL DECISION MAKING FREE TEXT BOX
59yo F w/ PMH HTN, DM, GERD pw FB sensation in throat s/p eating fish w/ bones for dinner at approx. 5pm. AFVSS. Pt well appearing, in NAD, no FB w/in visible oropharynx. Plan: Obtain CBC, CMP, PT/PTT/INR, give decadron, viscous lido. Consult ENT. Spoke w/ Dr Su (ENT) at [facility], transfer pt, will perform CT imaging at [facility], and likely scope pt in ED for FB removal. Pt updated, understands and agrees w/ this plan. Pt transferred to [facility] for ENT FB removal.

## 2020-12-23 NOTE — ED PROVIDER NOTE - PHYSICAL EXAMINATION
GEN: Awake, alert, interactive, NAD.  HEAD AND NECK: NC/AT. Airway patent. Neck supple.   EYES:  Clear b/l. EOMI. PERRL.   ENT: Moist mucus membranes.   CARDIAC: Regular rate, regular rhythm. No evident pedal edema.    RESP/CHEST: Normal respiratory effort with no use of accessory muscles or retractions. Clear throughout on auscultation.  ABD: soft, non-distended, non-tender. No rebound, no guarding.   BACK: No midline spinal TTP. No CVAT.   EXTREMITIES: Moving all extremities with no apparent deformities.   SKIN: Warm, dry, intact normal color. No rash.   NEURO: AOx3, CN II-XII grossly intact, no focal deficits.   PSYCH: Appropriate mood and affect. GEN: Awake, alert, interactive, NAD.  HEAD AND NECK: NC/AT. Airway patent. Neck supple.   EYES: Clear b/l. EOMI. PERRL.   ENT: Moist mucus membranes.   CARDIAC: Regular rate, regular rhythm. No evident pedal edema.    RESP/CHEST: Normal respiratory effort with no use of accessory muscles or retractions. Clear throughout on auscultation.  ABD: soft, non-distended, non-tender. No rebound, no guarding.   BACK: No midline spinal TTP. No CVAT.   EXTREMITIES: Moving all extremities with no apparent deformities.   SKIN: Warm, dry, intact normal color. No rash.   NEURO: AOx3, CN II-XII grossly intact, no focal deficits.   PSYCH: Appropriate mood and affect. GEN: Awake, alert, interactive, NAD.  HEAD AND NECK: NC/AT. Airway patent. Neck supple.   EYES: Clear b/l. EOMI. PERRL.   ENT: Moist mucus membranes. No evidence FB w/in visible oropharynx.   CARDIAC: Regular rate, regular rhythm. No evident pedal edema.    RESP/CHEST: Normal respiratory effort with no use of accessory muscles or retractions. Clear throughout on auscultation.  ABD: Soft, non-distended, non-tender. No rebound, no guarding.   BACK: No midline spinal TTP. No CVAT.   EXTREMITIES: Moving all extremities with no apparent deformities.   SKIN: Warm, dry, intact normal color. No rash.   NEURO: AOx3, CN II-XII grossly intact, no focal deficits.   PSYCH: Appropriate mood and affect.

## 2020-12-23 NOTE — ED PROVIDER NOTE - NS ED ROS FT
REVIEW OF SYSTEMS:    CONSTITUTIONAL: No weakness, fevers or chills  EYES/ENT: per HPI   NECK: No pain or stiffness  RESPIRATORY: No cough, wheezing, hemoptysis; No shortness of breath  CARDIOVASCULAR: No chest pain or palpitations  GASTROINTESTINAL: No abdominal or epigastric pain. No nausea, vomiting, or hematemesis; No diarrhea or constipation. No melena or hematochezia.  GENITOURINARY: No dysuria, frequency or hematuria  NEUROLOGICAL: No numbness or weakness  SKIN: No itching, burning, rashes, or lesions   All other review of systems is negative unless indicated above.

## 2020-12-24 PROBLEM — E11.9 TYPE 2 DIABETES MELLITUS WITHOUT COMPLICATIONS: Chronic | Status: ACTIVE | Noted: 2020-07-27

## 2020-12-24 PROBLEM — I10 ESSENTIAL (PRIMARY) HYPERTENSION: Chronic | Status: ACTIVE | Noted: 2020-07-27

## 2020-12-24 LAB — SARS-COV-2 RNA SPEC QL NAA+PROBE: SIGNIFICANT CHANGE UP

## 2021-08-29 ENCOUNTER — EMERGENCY (EMERGENCY)
Facility: HOSPITAL | Age: 61
LOS: 0 days | Discharge: HOME | End: 2021-08-29
Payer: COMMERCIAL

## 2021-08-29 VITALS
RESPIRATION RATE: 19 BRPM | HEART RATE: 76 BPM | TEMPERATURE: 98 F | WEIGHT: 259.93 LBS | SYSTOLIC BLOOD PRESSURE: 113 MMHG | OXYGEN SATURATION: 98 % | HEIGHT: 67 IN | DIASTOLIC BLOOD PRESSURE: 75 MMHG

## 2021-08-29 DIAGNOSIS — Z90.710 ACQUIRED ABSENCE OF BOTH CERVIX AND UTERUS: Chronic | ICD-10-CM

## 2021-08-29 DIAGNOSIS — Z20.822 CONTACT WITH AND (SUSPECTED) EXPOSURE TO COVID-19: ICD-10-CM

## 2021-08-29 DIAGNOSIS — Z98.891 HISTORY OF UTERINE SCAR FROM PREVIOUS SURGERY: Chronic | ICD-10-CM

## 2021-08-29 DIAGNOSIS — H92.02 OTALGIA, LEFT EAR: ICD-10-CM

## 2021-08-29 DIAGNOSIS — R52 PAIN, UNSPECIFIED: ICD-10-CM

## 2021-08-29 DIAGNOSIS — R51.0 HEADACHE WITH ORTHOSTATIC COMPONENT, NOT ELSEWHERE CLASSIFIED: ICD-10-CM

## 2021-08-29 LAB
ALBUMIN SERPL ELPH-MCNC: 3.7 G/DL — SIGNIFICANT CHANGE UP (ref 3.3–5)
ALP SERPL-CCNC: 77 U/L — SIGNIFICANT CHANGE UP (ref 40–120)
ALT FLD-CCNC: 28 U/L — SIGNIFICANT CHANGE UP (ref 12–78)
ANION GAP SERPL CALC-SCNC: 2 MMOL/L — LOW (ref 5–17)
AST SERPL-CCNC: 21 U/L — SIGNIFICANT CHANGE UP (ref 15–37)
BASOPHILS # BLD AUTO: 0.04 K/UL — SIGNIFICANT CHANGE UP (ref 0–0.2)
BASOPHILS NFR BLD AUTO: 0.5 % — SIGNIFICANT CHANGE UP (ref 0–2)
BILIRUB SERPL-MCNC: 0.3 MG/DL — SIGNIFICANT CHANGE UP (ref 0.2–1.2)
BUN SERPL-MCNC: 13 MG/DL — SIGNIFICANT CHANGE UP (ref 7–23)
CALCIUM SERPL-MCNC: 8.8 MG/DL — SIGNIFICANT CHANGE UP (ref 8.5–10.1)
CHLORIDE SERPL-SCNC: 105 MMOL/L — SIGNIFICANT CHANGE UP (ref 96–108)
CO2 SERPL-SCNC: 33 MMOL/L — HIGH (ref 22–31)
CREAT SERPL-MCNC: 0.85 MG/DL — SIGNIFICANT CHANGE UP (ref 0.5–1.3)
EOSINOPHIL # BLD AUTO: 0.05 K/UL — SIGNIFICANT CHANGE UP (ref 0–0.5)
EOSINOPHIL NFR BLD AUTO: 0.7 % — SIGNIFICANT CHANGE UP (ref 0–6)
GLUCOSE SERPL-MCNC: 237 MG/DL — HIGH (ref 70–99)
HCT VFR BLD CALC: 43.3 % — SIGNIFICANT CHANGE UP (ref 34.5–45)
HGB BLD-MCNC: 13.7 G/DL — SIGNIFICANT CHANGE UP (ref 11.5–15.5)
IMM GRANULOCYTES NFR BLD AUTO: 0.3 % — SIGNIFICANT CHANGE UP (ref 0–1.5)
LYMPHOCYTES # BLD AUTO: 4.24 K/UL — HIGH (ref 1–3.3)
LYMPHOCYTES # BLD AUTO: 55.2 % — HIGH (ref 13–44)
MCHC RBC-ENTMCNC: 27.5 PG — SIGNIFICANT CHANGE UP (ref 27–34)
MCHC RBC-ENTMCNC: 31.6 GM/DL — LOW (ref 32–36)
MCV RBC AUTO: 86.8 FL — SIGNIFICANT CHANGE UP (ref 80–100)
MONOCYTES # BLD AUTO: 0.49 K/UL — SIGNIFICANT CHANGE UP (ref 0–0.9)
MONOCYTES NFR BLD AUTO: 6.4 % — SIGNIFICANT CHANGE UP (ref 2–14)
NEUTROPHILS # BLD AUTO: 2.84 K/UL — SIGNIFICANT CHANGE UP (ref 1.8–7.4)
NEUTROPHILS NFR BLD AUTO: 36.9 % — LOW (ref 43–77)
NRBC # BLD: 0 /100 WBCS — SIGNIFICANT CHANGE UP (ref 0–0)
PLATELET # BLD AUTO: 233 K/UL — SIGNIFICANT CHANGE UP (ref 150–400)
POTASSIUM SERPL-MCNC: 4.2 MMOL/L — SIGNIFICANT CHANGE UP (ref 3.5–5.3)
POTASSIUM SERPL-SCNC: 4.2 MMOL/L — SIGNIFICANT CHANGE UP (ref 3.5–5.3)
PROT SERPL-MCNC: 8.1 GM/DL — SIGNIFICANT CHANGE UP (ref 6–8.3)
RAPID RVP RESULT: SIGNIFICANT CHANGE UP
RBC # BLD: 4.99 M/UL — SIGNIFICANT CHANGE UP (ref 3.8–5.2)
RBC # FLD: 13.9 % — SIGNIFICANT CHANGE UP (ref 10.3–14.5)
SARS-COV-2 RNA SPEC QL NAA+PROBE: SIGNIFICANT CHANGE UP
SODIUM SERPL-SCNC: 140 MMOL/L — SIGNIFICANT CHANGE UP (ref 135–145)
WBC # BLD: 7.68 K/UL — SIGNIFICANT CHANGE UP (ref 3.8–10.5)
WBC # FLD AUTO: 7.68 K/UL — SIGNIFICANT CHANGE UP (ref 3.8–10.5)

## 2021-08-29 PROCEDURE — 99284 EMERGENCY DEPT VISIT MOD MDM: CPT

## 2021-08-29 RX ORDER — SODIUM CHLORIDE 9 MG/ML
1000 INJECTION INTRAMUSCULAR; INTRAVENOUS; SUBCUTANEOUS ONCE
Refills: 0 | Status: COMPLETED | OUTPATIENT
Start: 2021-08-29 | End: 2021-08-29

## 2021-08-29 RX ORDER — EMPAGLIFLOZIN 10 MG/1
0 TABLET, FILM COATED ORAL
Qty: 0 | Refills: 0 | DISCHARGE

## 2021-08-29 RX ORDER — METOCLOPRAMIDE HCL 10 MG
10 TABLET ORAL ONCE
Refills: 0 | Status: COMPLETED | OUTPATIENT
Start: 2021-08-29 | End: 2021-08-29

## 2021-08-29 RX ORDER — ACETAMINOPHEN 500 MG
650 TABLET ORAL ONCE
Refills: 0 | Status: COMPLETED | OUTPATIENT
Start: 2021-08-29 | End: 2021-08-29

## 2021-08-29 RX ORDER — DIPHENHYDRAMINE HCL 50 MG
25 CAPSULE ORAL ONCE
Refills: 0 | Status: COMPLETED | OUTPATIENT
Start: 2021-08-29 | End: 2021-08-29

## 2021-08-29 RX ORDER — LIDOCAINE 4 G/100G
1 CREAM TOPICAL ONCE
Refills: 0 | Status: COMPLETED | OUTPATIENT
Start: 2021-08-29 | End: 2021-08-29

## 2021-08-29 RX ADMIN — SODIUM CHLORIDE 1000 MILLILITER(S): 9 INJECTION INTRAMUSCULAR; INTRAVENOUS; SUBCUTANEOUS at 12:30

## 2021-08-29 RX ADMIN — Medication 25 MILLIGRAM(S): at 12:31

## 2021-08-29 RX ADMIN — LIDOCAINE 1 PATCH: 4 CREAM TOPICAL at 12:32

## 2021-08-29 RX ADMIN — Medication 650 MILLIGRAM(S): at 12:31

## 2021-08-29 RX ADMIN — Medication 10 MILLIGRAM(S): at 12:31

## 2021-08-29 NOTE — ED PROVIDER NOTE - PROGRESS NOTE DETAILS
PARMINDER Elizalde: All lab results discussed with patient, no elevated wbc, glucose discussed. patient monitoring at home and adjusting diet. Pain improved significantly. Denies itching or pain. Strict return precautions given. Patient has PMD and will call tomorrow to follow up

## 2021-08-29 NOTE — ED ADULT NURSE NOTE - OBJECTIVE STATEMENT
A&Ox4, ambulating. p/w A&Ox4, ambulating. p/w b/l ear pain since Weds, with c/o "swollen neck glans" as per pt, with HA since yesterday, pain 2/10, constant with radiation toe left shoulder. pt neuro exam WNL, denies cp/sob/n/v/fevers/chills.

## 2021-08-29 NOTE — ED PROVIDER NOTE - NS ED ROS FT
Constitutional: (-) Fever, (-) Chills, (-) Anorexia  Skin: (-) Rashes  Eyes: (-) Visual changes, (-) Discharge, (-) Redness  Ears: (+) Ear pain, (-) Hearing loss, (-)Tinnitus  Nose: (-) Nasal congestion, (-) Runny nose  Mouth/Throat: (-) Sore throat  CV: (-) Chest pain, (-) Palpitations  Resp: (-) Cough, (-) Shortness of breath, (-) Dyspnea on Exertion  GI: (-) Abdominal pain, (-) Nausea, (-) Vomiting, (-) Diarrhea  : (-) Dysuria   MSK: (+) Back pain, (+) Neck pain, (-) Weakness, (-) Myalgias  Neuro:  (+) Headache, (-) Loss of consciousness, (-) Confusion

## 2021-08-29 NOTE — ED PROVIDER NOTE - CLINICAL SUMMARY MEDICAL DECISION MAKING FREE TEXT BOX
61y Female with PMHx of DM presents to the ER for ear pain. Left sided head/neck pain that travels to the ear x 5 days associated with swollen glands previously. Denies fever, chills, painful swallowing, difficulty swallowing, drooling, difficulty breathing, chest pain, shortness of breath, acute changes in vision, slurred speech, facial droop, one sided weakness. No blood thinners. Vital signs stable. Airway intact. Ears normal bilaterally. Left sided paraspinal tenderness. No neuro deficit. Likely musculoskeletal pain given tenderness and symptoms beginning after patient woke up - will check basic labs per patient request, give meds, reassess. Dispo pending results.

## 2021-08-29 NOTE — ED PROVIDER NOTE - OBJECTIVE STATEMENT
61y Female with PMHx of DM presents to the ER for ear pain. Patient reports left sided head/neck pain that travels to the ear x 5 days. States it seems very similar to when she had an ear infection in the past. Patient also reports glands being swollen a few days ago but has since resolved. Patient is concerned she has an infection. Has had covid in the past, covid vaccinated pfizer x 2. Denies fever, chills, painful swallowing, difficulty swallowing, drooling, difficulty breathing, chest pain, shortness of breath, acute changes in vision, slurred speech, facial droop, one sided weakness.    Of note, patient reporting itching to face without evidence of rash or swelling. Denies allergies or new lotions/washes.

## 2021-08-29 NOTE — ED PROVIDER NOTE - NSFOLLOWUPINSTRUCTIONS_ED_ALL_ED_FT
Today you were seen in the ER for pain.     You received medications for pain, lidocaine patch that can remain on for 12 hours and basic lab work.     You will receive a text message with the results of your viral panel.     Please see below for a copy of your results. There is no signs of infection in the blood work. Your sugar was elevated, continue to monitor, take medications daily as prescribed and stick to a diabetic diet.    Take Motrin 600 mg every 8 hours as needed for moderate pain-- take with food.    Take Tylenol 650mg (Two 325 mg pills) every 4-6 hours as needed for pain.    Advance activity as tolerated.     Continue all previously prescribed medications as directed unless otherwise instructed.     Follow up with your primary care physician in 48-72 hours- bring copies of your results.     Return to the ER for worsening or persistent symptoms, and/or ANY NEW OR CONCERNING SYMPTOMS including but not limited to fever, chills, facial droop, slurred speech, worsening pain, one sided weakness, headache, difficulty breathing or swallowing, painful swallowing or excessive drooling.     If you have issues obtaining follow up, please call: [phone] to obtain a doctor or specialist who takes your insurance in your area.  You may call [phone] to make an appointment with the internal medicine clinic.

## 2021-08-29 NOTE — ED ADULT TRIAGE NOTE - CHIEF COMPLAINT QUOTE
patient c/o of bilateral ears pain started on Wednesday c/o of swollen " neck glans", c/o of mild headache started yesterday  , denied difficulty breathing denied chest pain, denied dizziness denied blurred vision, moving all extremities no facial droop clear speech at the time of triage c/o of lump L side neck for 30 years

## 2021-08-29 NOTE — ED PROVIDER NOTE - PATIENT PORTAL LINK FT
You can access the FollowMyHealth Patient Portal offered by [facility] by registering at the following website: [email or web address]. By joining [employer]’s FollowMyHealth portal, you will also be able to view your health information using other applications (apps) compatible with our system.

## 2021-10-10 NOTE — ED ADULT NURSE NOTE - CHPI ED NUR SYMPTOMS POS
[FreeTextEntry1] : This patient presents today for general medical exam and to follow up for any medical issues. They deny any new health problems or new family medical history. The patient denies heat/cold intolerance, weight gain or loss, changes in their hair pattern, alteration in sleep habits, or change in their mood patterns. They also deny joint pain, rash, change in vision, or alteration in their bowel patterns.\par  \par Pt is requesting paperwork to be filled out for work w/ immunization titres.  bone stuck in throat

## 2022-06-25 ENCOUNTER — EMERGENCY (EMERGENCY)
Facility: HOSPITAL | Age: 62
LOS: 0 days | Discharge: HOME | End: 2022-06-25
Attending: EMERGENCY MEDICINE

## 2022-06-25 VITALS
RESPIRATION RATE: 16 BRPM | HEIGHT: 67 IN | TEMPERATURE: 98 F | HEART RATE: 76 BPM | OXYGEN SATURATION: 98 % | SYSTOLIC BLOOD PRESSURE: 138 MMHG | WEIGHT: 250 LBS | DIASTOLIC BLOOD PRESSURE: 85 MMHG

## 2022-06-25 VITALS
HEART RATE: 76 BPM | DIASTOLIC BLOOD PRESSURE: 64 MMHG | SYSTOLIC BLOOD PRESSURE: 106 MMHG | RESPIRATION RATE: 15 BRPM | TEMPERATURE: 97 F | OXYGEN SATURATION: 98 %

## 2022-06-25 DIAGNOSIS — M54.50 LOW BACK PAIN, UNSPECIFIED: ICD-10-CM

## 2022-06-25 DIAGNOSIS — N89.8 OTHER SPECIFIED NONINFLAMMATORY DISORDERS OF VAGINA: ICD-10-CM

## 2022-06-25 DIAGNOSIS — Z87.19 PERSONAL HISTORY OF OTHER DISEASES OF THE DIGESTIVE SYSTEM: ICD-10-CM

## 2022-06-25 DIAGNOSIS — I10 ESSENTIAL (PRIMARY) HYPERTENSION: ICD-10-CM

## 2022-06-25 DIAGNOSIS — M54.9 DORSALGIA, UNSPECIFIED: ICD-10-CM

## 2022-06-25 DIAGNOSIS — Z90.710 ACQUIRED ABSENCE OF BOTH CERVIX AND UTERUS: ICD-10-CM

## 2022-06-25 DIAGNOSIS — Z90.710 ACQUIRED ABSENCE OF BOTH CERVIX AND UTERUS: Chronic | ICD-10-CM

## 2022-06-25 DIAGNOSIS — E11.9 TYPE 2 DIABETES MELLITUS WITHOUT COMPLICATIONS: ICD-10-CM

## 2022-06-25 DIAGNOSIS — Z98.891 HISTORY OF UTERINE SCAR FROM PREVIOUS SURGERY: Chronic | ICD-10-CM

## 2022-06-25 LAB
APPEARANCE UR: CLEAR — SIGNIFICANT CHANGE UP
BACTERIA # UR AUTO: NEGATIVE — SIGNIFICANT CHANGE UP
BILIRUB UR-MCNC: NEGATIVE — SIGNIFICANT CHANGE UP
COLOR SPEC: YELLOW — SIGNIFICANT CHANGE UP
DIFF PNL FLD: NEGATIVE — SIGNIFICANT CHANGE UP
EPI CELLS # UR: SIGNIFICANT CHANGE UP
GLUCOSE UR QL: 1000 MG/DL
KETONES UR-MCNC: NEGATIVE — SIGNIFICANT CHANGE UP
LEUKOCYTE ESTERASE UR-ACNC: NEGATIVE — SIGNIFICANT CHANGE UP
NITRITE UR-MCNC: NEGATIVE — SIGNIFICANT CHANGE UP
PH UR: 6.5 — SIGNIFICANT CHANGE UP (ref 5–8)
PROT UR-MCNC: NEGATIVE MG/DL — SIGNIFICANT CHANGE UP
RBC CASTS # UR COMP ASSIST: NEGATIVE /HPF — SIGNIFICANT CHANGE UP (ref 0–4)
SP GR SPEC: 1.01 — SIGNIFICANT CHANGE UP (ref 1.01–1.02)
UROBILINOGEN FLD QL: NEGATIVE MG/DL — SIGNIFICANT CHANGE UP
WBC UR QL: SIGNIFICANT CHANGE UP

## 2022-06-25 PROCEDURE — 99284 EMERGENCY DEPT VISIT MOD MDM: CPT

## 2022-06-25 RX ORDER — ACETAMINOPHEN 500 MG
975 TABLET ORAL ONCE
Refills: 0 | Status: COMPLETED | OUTPATIENT
Start: 2022-06-25 | End: 2022-06-25

## 2022-06-25 RX ORDER — LIDOCAINE 4 G/100G
1 CREAM TOPICAL
Qty: 5 | Refills: 0
Start: 2022-06-25 | End: 2022-06-29

## 2022-06-25 RX ORDER — LIDOCAINE 4 G/100G
1 CREAM TOPICAL ONCE
Refills: 0 | Status: COMPLETED | OUTPATIENT
Start: 2022-06-25 | End: 2022-06-25

## 2022-06-25 RX ORDER — FLUCONAZOLE 150 MG/1
150 TABLET ORAL ONCE
Refills: 0 | Status: COMPLETED | OUTPATIENT
Start: 2022-06-25 | End: 2022-06-25

## 2022-06-25 RX ADMIN — LIDOCAINE 1 PATCH: 4 CREAM TOPICAL at 20:21

## 2022-06-25 RX ADMIN — Medication 975 MILLIGRAM(S): at 20:21

## 2022-06-25 RX ADMIN — FLUCONAZOLE 150 MILLIGRAM(S): 150 TABLET ORAL at 21:45

## 2022-06-25 NOTE — ED PROVIDER NOTE - NS ED ROS FT
ROS:  GEN: (-) fevers/chills, (-) weight loss, (-) fatigue/malaise  HEENT: (-) visual change, (-) photophobia  NECK: (-) stiffness, (-) swelling  RESP: (-) shortness of breath, (-) cough, (-) sputum  CV: (-) chest pain, (-) palpitations  GI: (-) nausea, (-) vomiting, (-) pain, (-) constipation, (-) diarrhea  : (-) frequency/urgency, (-) hematuria, (-) dysuria, (-) incontinence, (+) discharge  EXT: (-) edema, (-) cyanosis  ENDO: (-) heat/cold intolerance, (-) polyuria  NEURO: (-) paresthesias, (-) weakness, (-) headache, (-) dizziness, (-) syncope  MSK: (+) muscle pain, (+) back pain  SKIN: (-) rash

## 2022-06-25 NOTE — ED ADULT NURSE REASSESSMENT NOTE - NS ED NURSE REASSESS COMMENT FT1
patient is A&Ox4. Breathing unlabored on RA. No acute or respiratory distress noted. patient discharged. left the unit in a stable condition.

## 2022-06-25 NOTE — ED ADULT NURSE NOTE - NS ED PATIENT SAFETY CONCERN
Physical Therapy  Visit Type: initial evaluation  Co-treat with: Occupational therapist  Precautions:  Medical precautions: Pertinent MHx: COPD, CAD, OA in R knee, AFib status post pacemaker, CAD s/p quadruple bypass, IBS, bronchitis, spinal stenosis, indwelling Mcdonough catheter     Admitted 10/18/2021 as Inpatient with a diagnosis of elevated lactic acid level, occasional tremors, leukocytosis, UTI, general weakness, kidney/bladder/ureter stones    10/19: Tremors of unknown etiology have resolved.   10/19:  - BILATERAL MCDONOUGH CATHETER INSERTION / CHANGE  - Cystoscopy and Bilateral ureteral stent placement     Lines:     Basic: O2, telemetry, continuous pulse oximetry and IV (2L O2)      Lines in chart and on patient reviewed, precautions maintained throughout session.  Safety Measures: bed alarm and chair alarm    SUBJECTIVE  Patient agreed to participate in therapy this date.  Patient supine in bed and agreeable to therapy session  Patient / Family Goal: return to previous functional status and return home  No complaints of pain this session     OBJECTIVE   Level of consciousness: alert    Oriented to person, place, time and situation     Arousal alertness: appropriate responses to stimuli    Affect/Behavior: cooperative  Patient activity tolerance: 1 to 2 activity to rest    Bed Mobility:        Supine to sit: modified independent  Training completed:    Tasks: supine to sit    Education details: body mechanics, patient safety and patient demonstrates understanding    Modified independent bed mobility for increased time to complete.  No losses of balance or instability.  Good strength to complete.  Transfers:    Assistive devices: 2-wheeled walker, 1 person and gait belt    Sit to stand: minimal assist    Stand to sit: minimal assist    Stand pivot: minimal assist  Training completed:    Tasks: sit to stand, stand to sit and stand pivot    Education details: patient safety, body mechanics and patient requires  additional training    Minimal assistance with transfers this session with assistance at gait belt for safety.  Patient initially attempted to complete without physical assistance, however with posterior loss of balance onto bed and requiring minimal assistance to complete.  Gait/Ambulation:     Assistance: minimal assist   Assistive device: 2-wheeled walker, 1 person and gait belt    Distance (ft): 10    Type: shuffling    Surface: even and tile  Training Completed:    Tasks: gait training on level surfaces    Education details: body mechanics, patient safety and patient requires additional training    10 feet of ambulation with minimal assistance for balance and stability.  Patient does not complete ambulation at home without assistance, but reports that a volunteer comes twice per week to take him for walks.  Wheelchair Mobility:      Assistance: supervision    Distance: 25 ft    Propel method: left upper extremity, right upper extremity, left lower extremity and right lower extremity    Patient completed 25 feet of wheelchair mobility from bed to bathroom and back to recliner chair.  Slow sequencing with shortness of breath, but did not require physical assistance.  Training Completed:    Tasks: on level surfaces    Education details: patient safety and patient requires additional training      Interventions     Training provided: bed mobility training, transfer training, gait training, safety training and wheelchair mobility    Skilled input: Verbal instruction/cues and tactile instruction/cues  Verbal Consent: Writer verbally educated and received verbal consent for hand placement, positioning of patient, and techniques to be performed today from patient for hand placement and palpation for techniques, clothing adjustments for techniques and therapist position for techniques as described above and how they are pertinent to the patient's plan of care.       ASSESSMENT    Impairments: strength, activity  tolerance, balance deficits, safety awareness and endurance  Functional Limitations: all functional mobility    Patient seen on [facility] nursing unit. Patient admitted with a diagnosis of elevated lactic acid level, occasional tremors, leukocytosis, UTI, general weakness, kidney/bladder/ureter stones. Past medical history significant for but not limited to: COPD, CAD, OA in R knee, AFib status post pacemaker, CAD s/p quadruple bypass, IBS, bronchitis, spinal stenosis, indwelling Ang catheter.      Patient lives alone in assisted living facility. For safe return to prior living situation the patient needs to be modified independent with pivot transfers to and from wheelchair.  May benefit from further ambulation trials, but does not require ambulation completion for discharge to home.    Physical therapy evaluation focused on bed mobility, transfers and short distance ambulation.  The patient required minimal assistance for transfers and 10 feet of ambulation in room.  Patient reports feeling better, but continues to feel short of breath with activity.  PT will follow for 1-2 additional sessions to assure confidence with mobility for discharge. Patient is currently functioning at minimal assist  for overall mobility.    Patient presents near baseline , which was modified independent with pivot transfers. Patient currently has assistance to return to their previous living situation.      Discharge Recommendations  PT Identified Barriers to Discharge: Shortness of breath with activity   Recommendation for Discharge: PT WI: Home, Home therapy             PT/OT Mobility Equipment for Discharge: Pt has 2ww and wheelchair   PT/OT ADL Equipment for Discharge: Continue to assess             Skilled therapy is required to address these limitations in attempt to maximize the patient's independence.  Predicted patient presentation: Moderate (evolving) - Patient comorbidities and complexities, as defined above, may have varying  impact on steady progress for prescribed plan of care.    End of Session:   Location: in chair  Safety measures: alarm system in place/re-engaged, equipment intact, call light within reach and lines intact  Handoff to: nurse  Education Provided:   Learning assessment:  - Primary learner: patient  - Are they ready to learn: yes  - Preferred learning style: verbal  - Barriers to learning: no barriers apparent at this time  Education provided during session:  - Receiving education: patient  - Results of above outlined education: Verbalizes understanding and Needs reinforcement    PLAN    Suggestions for next session as indicated: Plan: Bring wheelchair for pivot transfers to wheelchair, wheelchair mobility and short distance ambulation as appropriate.  Assure confidence with mobility for discharge    PT Frequency: 5 days/week  Frequency Comments: JACQUES Gresham 10/20      Interventions: bed mobility, functional transfer training, gait training, safety education and wheelchair mobility  Agreement to plan and goals: patient agrees with goals and treatment plan        GOALS  Review Date: 10/27/2021  Long Term Goals: (to be met by time of discharge from hospital)  Sit to supine: Patient will complete sit to supine modified independent.  Supine to sit: Patient will complete supine to sit modified independent.  Sit to stand: Patient will complete sit to stand transfer with modified independent.   Stand to sit: Patient will complete stand to sit transfer with modified independent.   Stand pivot: Patient will complete stand pivot transfer with 2-wheeled walker, modified independent.   Wheelchair mobility (level): Patient complete wheelchair mobility over level surfaces 50, using manual propel method, modified independent.     Documented in the chart in the following areas: Prior Level of Function. Assessment.      Therapy procedure time and total treatment time can be found documented on the Time Entry flowsheet    Rosa Davalos  DPT  Rehab Services: [phone]     No

## 2022-06-25 NOTE — ED PROVIDER NOTE - PHYSICAL EXAMINATION
PHYSICAL EXAMINATION:  VITALS REVIEWED.  VS slightly hypertensive, otherwise normal  GENERALIZED APPEARANCE:  Comfortable, no acute distress, ambulating without difficulty  SKIN:  Warm, dry, no cyanosis  HEAD:  No obvious scalp lesions  EYES:  Conjunctiva pink, no icterus  ENMT:  Mucus membranes moist, no stridor  NECK:  Supple, non-tender  CHEST AND RESPIRATORY:  Clear to auscultation B/L, good air entry B/L, equal chest expansion  HEART AND CARDIOVASCULAR:  Regular rate, no obvious murmur  ABDOMEN AND GI:  Soft, non-tender, non-distended.  No rebound, no guarding, no CVA-area tenderness  BACK: No midline tenderness, R para-lumbar hypertonicity  EXTREMITIES:  No deformity, edema, or calf tenderness  NEURO: AAOx3, gross motor and sensory intact  PSYCH: Normal affect

## 2022-06-25 NOTE — ED ADULT TRIAGE NOTE - PAIN: PRESENCE, MLM
Pt escorted to room by mom to have flu vaccine. Mom verified name and date of birth. Advised mom to wait 15 minutes to assess for any adverse reactions.  Vaccine administered into left deltoid.  Pt tolerated well. Pt. left with mom in no distress.  
complains of pain/discomfort
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
## 2022-06-25 NOTE — ED PROVIDER NOTE - NSFOLLOWUPCLINICS_GEN_ALL_ED_FT
[facility]  [facility]thopedics  .  [place]   Phone: [phone]  Fax:   Follow Up Time: Routine
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
## 2022-06-25 NOTE — ED PROVIDER NOTE - PATIENT PORTAL LINK FT
You can access the FollowMyHealth Patient Portal offered by [facility] by registering at the following website: [email or web address]. By joining [employer]’s FollowMyHealth portal, you will also be able to view your health information using other applications (apps) compatible with our system.

## 2022-06-25 NOTE — ED PROVIDER NOTE - NSFOLLOWUPINSTRUCTIONS_ED_ALL_ED_FT
Back Pain    Back pain is very common in adults. The cause of back pain is rarely dangerous and the pain often gets better over time. The cause of your back pain may not be known and may include strain of muscles or ligaments, degeneration of the spinal disks, or arthritis. Occasionally the pain may radiate down your leg(s). Over-the-counter medicines to reduce pain and inflammation are often the most helpful. Stretching and remaining active frequently helps the healing process.     SEEK IMMEDIATE MEDICAL CARE IF YOU HAVE ANY OF THE FOLLOWING SYMPTOMS: bowel or bladder control problems, unusual weakness or numbness in your arms or legs, nausea or vomiting, abdominal pain, fever, dizziness/lightheadedness.    IMPORTANT MESSAGE FROM YOUR DOCTOR:  Although you have been discharged from the ER, this does not mean that you have a "clean bill of health".  If your symptoms persist or get worse or if any new symptoms develop, please return to the ER immediately for re-evaluation (especially if your symptoms include chest pain, difficulty breathing, abdominal pain, fever, headache, confusion, trouble seeing, or trouble walking).  It is also very important that you see a primary care doctor within the next few days to follow-up.

## 2022-06-25 NOTE — ED ADULT NURSE NOTE - NSIMPLEMENTINTERV_GEN_ALL_ED
Implemented All Fall Risk Interventions:  [place] to call system. Call bell, personal items and telephone within reach. Instruct patient to call for assistance. Room bathroom lighting operational. Non-slip footwear when patient is off stretcher. Physically safe environment: no spills, clutter or unnecessary equipment. Stretcher in lowest position, wheels locked, appropriate side rails in place. Provide visual cue, wrist band, yellow gown, etc. Monitor gait and stability. Monitor for mental status changes and reorient to person, place, and time. Review medications for side effects contributing to fall risk. Reinforce activity limits and safety measures with patient and family. None

## 2022-06-25 NOTE — ED PROVIDER NOTE - PROGRESS NOTE DETAILS
Patient re-evaluated. UA negative for UTI, will treat for yeast infection. Patient understands to follow up with their regular doctor. Patient understands to return to the ED is symptoms worsen or progress. Discharge instructions were given to the patient and discussed with patient. Rx were electronically sent to patient's preferred pharmacy.

## 2022-06-27 LAB
CULTURE RESULTS: SIGNIFICANT CHANGE UP
SPECIMEN SOURCE: SIGNIFICANT CHANGE UP

## 2022-07-02 NOTE — ED ADULT NURSE NOTE - MODE OF DISCHARGE
[Transportation Issues] : Transportation issues [Other: ____] : [unfilled] [de-identified] : Will reach out to SW on her behalf to see what resources we have to assist - discussed this with Ms W and she consents  Ambulatory

## 2022-08-23 NOTE — ED PROVIDER NOTE - CROS ED NEURO ALL NEG
Tranexamic Acid Counseling:  Patient advised of the small risk of bleeding problems with tranexamic acid. They were also instructed to call if they developed any nausea, vomiting or diarrhea. All of the patient's questions and concerns were addressed. negative...

## 2022-10-03 NOTE — ED ADULT NURSE NOTE - NSFALLRSKUNASSIST_ED_ALL_ED
Acute bronchitis  Discussed with the patient the viral likelyhood of bronchitis and that antibiotics will likely not shorten course  rx tessalon perls sent via EMR as needed for cough  rx albuterol hfa as needed for wheezing and SOB  Can also try mucinex DM or robitussin DM as needed for cough  Rest, fluids and supportive care    Follow up with PCP in 3-5 days  Proceed to  ER if symptoms worsen  no

## 2022-10-06 NOTE — ED ADULT NURSE NOTE - PAIN RATING/NUMBER SCALE (0-10): REST
General Sunscreen Counseling: Sun protect with sunscreen and clothing. Remember to reapply sunscreen.
Detail Level: Generalized
2

## 2022-10-11 NOTE — ED ADULT TRIAGE NOTE - CCCP TRG CHIEF CMPLNT
foreign body throat Detail Level: Zone Other (Free Text): 89 yo F s/p FTSG of left middle finger with a Mohs defect on 10/06/22, who is doing well postoperatively. Bolster removal reveals excellent take of skin graft without signs of infection. Today we discussed the following: \\n- Advised patient to discontinue applying antibiotic ointment to the donor site incision. \\n- No evidence of infection, seroma, or hematoma present at today’s appointment. \\n- Instructed patient and son to apply Vaseline/aquaphor, cover with Xeroform and bandage daily.  \\n- Instructed to keep skin graft site dry.\\n- No additional wound care required for donor site other than keeping it clean, dry, and away from direct shower stream while showering.\\n- Advised patient to schedule regular skin checks with Dermatologist due to skin cancer history. \\n- Sutures will be removed next week. \\n- Follow up in 1 week. \\n\\Tammie questions were addressed and answered.

## 2022-11-11 ENCOUNTER — EMERGENCY (EMERGENCY)
Facility: HOSPITAL | Age: 62
LOS: 0 days | Discharge: HOME | End: 2022-11-12
Attending: STUDENT IN AN ORGANIZED HEALTH CARE EDUCATION/TRAINING PROGRAM

## 2022-11-11 VITALS
RESPIRATION RATE: 20 BRPM | OXYGEN SATURATION: 98 % | HEIGHT: 67 IN | WEIGHT: 257.06 LBS | DIASTOLIC BLOOD PRESSURE: 86 MMHG | HEART RATE: 70 BPM | TEMPERATURE: 98 F | SYSTOLIC BLOOD PRESSURE: 127 MMHG

## 2022-11-11 DIAGNOSIS — Z90.710 ACQUIRED ABSENCE OF BOTH CERVIX AND UTERUS: ICD-10-CM

## 2022-11-11 DIAGNOSIS — M54.6 PAIN IN THORACIC SPINE: ICD-10-CM

## 2022-11-11 DIAGNOSIS — R07.9 CHEST PAIN, UNSPECIFIED: ICD-10-CM

## 2022-11-11 DIAGNOSIS — Z87.19 PERSONAL HISTORY OF OTHER DISEASES OF THE DIGESTIVE SYSTEM: ICD-10-CM

## 2022-11-11 DIAGNOSIS — Z20.822 CONTACT WITH AND (SUSPECTED) EXPOSURE TO COVID-19: ICD-10-CM

## 2022-11-11 DIAGNOSIS — E11.9 TYPE 2 DIABETES MELLITUS WITHOUT COMPLICATIONS: ICD-10-CM

## 2022-11-11 DIAGNOSIS — Z98.891 HISTORY OF UTERINE SCAR FROM PREVIOUS SURGERY: Chronic | ICD-10-CM

## 2022-11-11 DIAGNOSIS — Z90.710 ACQUIRED ABSENCE OF BOTH CERVIX AND UTERUS: Chronic | ICD-10-CM

## 2022-11-11 DIAGNOSIS — I10 ESSENTIAL (PRIMARY) HYPERTENSION: ICD-10-CM

## 2022-11-11 LAB
ALBUMIN SERPL ELPH-MCNC: 3.6 G/DL — SIGNIFICANT CHANGE UP (ref 3.3–5)
ALP SERPL-CCNC: 76 U/L — SIGNIFICANT CHANGE UP (ref 40–120)
ALT FLD-CCNC: 25 U/L — SIGNIFICANT CHANGE UP (ref 12–78)
ANION GAP SERPL CALC-SCNC: 5 MMOL/L — SIGNIFICANT CHANGE UP (ref 5–17)
AST SERPL-CCNC: 13 U/L — LOW (ref 15–37)
BASOPHILS # BLD AUTO: 0.04 K/UL — SIGNIFICANT CHANGE UP (ref 0–0.2)
BASOPHILS NFR BLD AUTO: 0.5 % — SIGNIFICANT CHANGE UP (ref 0–2)
BILIRUB SERPL-MCNC: 0.2 MG/DL — SIGNIFICANT CHANGE UP (ref 0.2–1.2)
BUN SERPL-MCNC: 15 MG/DL — SIGNIFICANT CHANGE UP (ref 7–23)
CALCIUM SERPL-MCNC: 8.9 MG/DL — SIGNIFICANT CHANGE UP (ref 8.5–10.1)
CHLORIDE SERPL-SCNC: 108 MMOL/L — SIGNIFICANT CHANGE UP (ref 96–108)
CO2 SERPL-SCNC: 28 MMOL/L — SIGNIFICANT CHANGE UP (ref 22–31)
CREAT SERPL-MCNC: 0.96 MG/DL — SIGNIFICANT CHANGE UP (ref 0.5–1.3)
D DIMER BLD IA.RAPID-MCNC: <150 NG/ML DDU — SIGNIFICANT CHANGE UP
EGFR: 66 ML/MIN/1.73M2 — SIGNIFICANT CHANGE UP
EOSINOPHIL # BLD AUTO: 0.13 K/UL — SIGNIFICANT CHANGE UP (ref 0–0.5)
EOSINOPHIL NFR BLD AUTO: 1.6 % — SIGNIFICANT CHANGE UP (ref 0–6)
FLUAV AG NPH QL: SIGNIFICANT CHANGE UP
FLUBV AG NPH QL: SIGNIFICANT CHANGE UP
GLUCOSE SERPL-MCNC: 178 MG/DL — HIGH (ref 70–99)
HCT VFR BLD CALC: 42.7 % — SIGNIFICANT CHANGE UP (ref 34.5–45)
HGB BLD-MCNC: 13.1 G/DL — SIGNIFICANT CHANGE UP (ref 11.5–15.5)
IMM GRANULOCYTES NFR BLD AUTO: 0.1 % — SIGNIFICANT CHANGE UP (ref 0–0.9)
LYMPHOCYTES # BLD AUTO: 4.93 K/UL — HIGH (ref 1–3.3)
LYMPHOCYTES # BLD AUTO: 61.2 % — HIGH (ref 13–44)
MCHC RBC-ENTMCNC: 27 PG — SIGNIFICANT CHANGE UP (ref 27–34)
MCHC RBC-ENTMCNC: 30.7 G/DL — LOW (ref 32–36)
MCV RBC AUTO: 88 FL — SIGNIFICANT CHANGE UP (ref 80–100)
MONOCYTES # BLD AUTO: 0.6 K/UL — SIGNIFICANT CHANGE UP (ref 0–0.9)
MONOCYTES NFR BLD AUTO: 7.5 % — SIGNIFICANT CHANGE UP (ref 2–14)
NEUTROPHILS # BLD AUTO: 2.34 K/UL — SIGNIFICANT CHANGE UP (ref 1.8–7.4)
NEUTROPHILS NFR BLD AUTO: 29.1 % — LOW (ref 43–77)
NRBC # BLD: 0 /100 WBCS — SIGNIFICANT CHANGE UP (ref 0–0)
PLATELET # BLD AUTO: 236 K/UL — SIGNIFICANT CHANGE UP (ref 150–400)
POTASSIUM SERPL-MCNC: 4 MMOL/L — SIGNIFICANT CHANGE UP (ref 3.5–5.3)
POTASSIUM SERPL-SCNC: 4 MMOL/L — SIGNIFICANT CHANGE UP (ref 3.5–5.3)
PROT SERPL-MCNC: 7.7 GM/DL — SIGNIFICANT CHANGE UP (ref 6–8.3)
RBC # BLD: 4.85 M/UL — SIGNIFICANT CHANGE UP (ref 3.8–5.2)
RBC # FLD: 14.2 % — SIGNIFICANT CHANGE UP (ref 10.3–14.5)
SARS-COV-2 RNA SPEC QL NAA+PROBE: SIGNIFICANT CHANGE UP
SODIUM SERPL-SCNC: 141 MMOL/L — SIGNIFICANT CHANGE UP (ref 135–145)
TROPONIN I, HIGH SENSITIVITY RESULT: 5.4 NG/L — SIGNIFICANT CHANGE UP
WBC # BLD: 8 K/UL — SIGNIFICANT CHANGE UP (ref 3.8–10.5)
WBC # FLD AUTO: 8 K/UL — SIGNIFICANT CHANGE UP (ref 3.8–10.5)

## 2022-11-11 PROCEDURE — 93010 ELECTROCARDIOGRAM REPORT: CPT

## 2022-11-11 PROCEDURE — 76705 ECHO EXAM OF ABDOMEN: CPT | Mod: 26

## 2022-11-11 PROCEDURE — 71046 X-RAY EXAM CHEST 2 VIEWS: CPT | Mod: 26

## 2022-11-11 PROCEDURE — 99285 EMERGENCY DEPT VISIT HI MDM: CPT

## 2022-11-11 RX ORDER — ACETAMINOPHEN 500 MG
975 TABLET ORAL ONCE
Refills: 0 | Status: COMPLETED | OUTPATIENT
Start: 2022-11-11 | End: 2022-11-11

## 2022-11-11 RX ORDER — FAMOTIDINE 10 MG/ML
20 INJECTION INTRAVENOUS ONCE
Refills: 0 | Status: COMPLETED | OUTPATIENT
Start: 2022-11-11 | End: 2022-11-11

## 2022-11-11 RX ADMIN — Medication 975 MILLIGRAM(S): at 21:52

## 2022-11-11 RX ADMIN — FAMOTIDINE 20 MILLIGRAM(S): 10 INJECTION INTRAVENOUS at 21:05

## 2022-11-11 RX ADMIN — Medication 975 MILLIGRAM(S): at 21:05

## 2022-11-11 NOTE — ED PROVIDER NOTE - DISCHARGE DATE
Referred by: Anna Busch, PT; Medical Diagnosis (from order):    Diagnosis Information      Diagnosis    719.45 (ICD-9-CM) - M25.551 (ICD-10-CM) - Right hip pain                Physical Therapy -  Daily Treatment Note    Visit:  4     SUBJECTIVE                                                                                                             Patient reports 2 days of improvement however pain then worsened again.  Functional Change: Per above.      OBJECTIVE                                                                                                                       Palpation:   Left Lower Extremity: Gluteus Medius: trigger point; Gluteus Minimus: trigger point;   Right Lower Extremity: Lumbar Paraspinals: trigger point; Gluteus Medius: trigger point; Gluteus Minimus: trigger point;   Spine - Left: Lumbar Paraspinals:trigger point;       TREATMENT                                                                                                                  Therapeutic Exercise:  -Aly neutral curl up: 5x5\" R/L  -Pallof press: Attempt, reports reproduction of pain  -Split squat: x8 R/L    Verbal review of LE strength progression to include split squat, step up, step down progression.    Manual Therapy:  Side lying QL trigger point release R/L    Dry Needling:  Dry Needling  Education about indications, contraindications and potential side effects completed with patient.  Screen Completed     Precautions:  Local skin lesions  Lyme disease  Local lymphedema  Severe hyperalgesia/allodynia  Metal allergies: nickel and chromium  Abnormal bleeding tendency  Immunodeficiency and/or compromised immune system  Second or third trimester of pregnancy  Vascular Disease  History of spontaneous pneumothorax    Contraindications:  Local or systemic infections; including the flu  Over implants  Active cancer  Area of lymphatic compromise  Area of lumpectomy/mastectomy  First trimester of  pregnancy    Patient has consented to proceed with the intervention.    Dry Needling:   • Location: R/L gluteus medius Needle size: 75 Quantity: 2  • Location: R/L gluteus kalpana Needle size: 75 Quantity: 2  • Location: R/L multifidus Needle size: 50 Quantity: 3  • Location: R longissimus Needle size: 50 Quantity: 1    Total Needles Inserted: 8 Removed: 8  Duration: 5 minutes  Results: decreased pain; no adverse reaction to treatment    Skilled input: verbal instruction/cues    Writer verbally educated and received verbal consent for hand placement, positioning of patient, and techniques to be performed today from patient for clothing adjustments for techniques as described above and how they are pertinent to the patient's plan of care.    Home Exercise Program: Clamshell  SLR abduction  Side stepping  Dead bug  Hip flexion supine     ASSESSMENT                                                                                                             Reports reproduction of painful symptoms with dry needling of lumbar paraspinal mm. Improved lumbar control with strength progression. Continues to benefit from skilled care to address impairments and return to PLOF.  Pain/symptoms after session (out of 10): 0    Patient Education:   Results of above outlined education: Verbalizes understanding and Needs reinforcement      PLAN                                                                                                                           Suggestions for next session as indicated: Progress per plan of care         Therapy procedure time and total treatment time can be found documented on the Time Entry flowsheet   12-Nov-2022

## 2022-11-11 NOTE — ED ADULT NURSE NOTE - NSIMPLEMENTINTERV_GEN_ALL_ED
Implemented All Universal Safety Interventions:  [place] to call system. Call bell, personal items and telephone within reach. Instruct patient to call for assistance. Room bathroom lighting operational. Non-slip footwear when patient is off stretcher. Physically safe environment: no spills, clutter or unnecessary equipment. Stretcher in lowest position, wheels locked, appropriate side rails in place.
27-Nov-2017 06:25

## 2022-11-11 NOTE — ED PROVIDER NOTE - PATIENT PORTAL LINK FT
You can access the FollowMyHealth Patient Portal offered by [facility] by registering at the following website: [email or web address]. By joining [employer]’s FollowMyHealth portal, you will also be able to view your health information using other applications (apps) compatible with our system.

## 2022-11-11 NOTE — ED PROVIDER NOTE - PROGRESS NOTE DETAILS
Attending Ronn: received sign out pending labs and imaging. Labs and imaging reviewed, no emergent pathology. D-dimer neg. Informed pt of test results. Return precautions provided and discussed. ready for DC.

## 2022-11-11 NOTE — ED ADULT NURSE NOTE - NS ED NURSE DISCH DISPOSITION
Pt's brother at bedside. Updated pt and family on plan of care. Pt remains on cardiac monitor. Warm blanket provided.   Discharged

## 2022-11-11 NOTE — ED PROVIDER NOTE - NSFOLLOWUPINSTRUCTIONS_ED_ALL_ED_FT
1) Follow up with your doctor this week  2) Return to the ED immediately for new or worsening symptoms   3) Please continue to take any home medications as prescribed  4) Your test results from your ED visit were discussed with you prior to discharge  5) You were provided with a copy of your test results  6) Please take Tylenol 975 mg every 6 hours as needed for pain. Please do not exceed more than 4,000mg of Tylenol in a day     Chest Pain    Chest pain can be caused by many different conditions which may or may not be dangerous. Causes include heartburn, lung infections, heart attack, blood clot in lungs, skin infections, strain or damage to muscle, cartilage, or bones, etc. In addition to a history and physical examination, an electrocardiogram (ECG) or other lab tests may have been performed to determine the cause of your chest pain. Follow up with your primary care provider or with a cardiologist as instructed.     SEEK IMMEDIATE MEDICAL CARE IF YOU HAVE ANY OF THE FOLLOWING SYMPTOMS: worsening chest pain, coughing up blood, unexplained back/neck/jaw pain, severe abdominal pain, dizziness or lightheadedness, fainting, shortness of breath, sweaty or clammy skin, vomiting, or racing heart beat. These symptoms may represent a serious problem that is an emergency. Do not wait to see if the symptoms will go away. Get medical help right away. Call 911 and do not drive yourself to the hospital.

## 2022-11-11 NOTE — ED PROVIDER NOTE - CLINICAL SUMMARY MEDICAL DECISION MAKING FREE TEXT BOX
Nontoxic appearing in nad, nonischemic ecg.  TTP epigastrium/ ruq.  Possible gb.  Will send labs with trop, dimer for pleuritic pain, XR, tele, reassess after results.

## 2022-11-11 NOTE — ED PROVIDER NOTE - PHYSICAL EXAMINATION
General appearance: Nontoxic appearing, conversant, afebrile    Eyes: anicteric sclerae, KAREN, EOMI   HENT: Atraumatic; oropharynx clear, MMM and no ulcerations, no pharyngeal erythema or exudate   Neck: Trachea midline; Full range of motion, supple   Pulm: CTA bl, normal respiratory effort and no intercostal retractions, normal work of breathing   CV: RRR, No murmurs, rubs, or gallops   Abdomen: Soft, ruq/ epigastrium tender, non-distended; no guarding or rebound   Back: No midline ttp, step offs, deformities, no cvat    Extremities: No peripheral edema, no gross deformities, FROM x4   Skin: Dry, normal temperature, turgor and texture; no rash   Psych: Appropriate affect, cooperative

## 2022-11-11 NOTE — ED PROVIDER NOTE - OBJECTIVE STATEMENT
63yo female with pmh dm htn presenting with r midthoracic back pain that began to radiate around to chest.  Feels pressure like, worse with inspiration and somewhat with movement.  No sob, fever, cough, n/v, edema, calf pain.  Never had this before.  No cardiac history, hx dvt/pe.  Pshx hysterectomy.

## 2022-11-11 NOTE — ED ADULT NURSE NOTE - OBJECTIVE STATEMENT
Received 62yr old female patient A&Ox3, breathing even and unlabored breaths. Patient c/o substernal chest pain since 5:15am today. Patient made comfortable to room 8. Pt placed on cardiac monitor. No acute distress noted or verbalized at this time. RN will continue to monitor.
independent

## 2022-11-11 NOTE — ED ADULT NURSE REASSESSMENT NOTE - NS ED NURSE REASSESS COMMENT FT1
Pt sleeping comfortably in bed, easy to arouse, AAOx4 when awake. No complaints at this time. Respirations equal and unlabored. No acute distress noted at this time. Remains on cardiac monitor.

## 2022-11-12 VITALS
SYSTOLIC BLOOD PRESSURE: 129 MMHG | OXYGEN SATURATION: 97 % | HEART RATE: 63 BPM | TEMPERATURE: 98 F | DIASTOLIC BLOOD PRESSURE: 75 MMHG | RESPIRATION RATE: 20 BRPM

## 2023-02-15 NOTE — ED PROVIDER NOTE - CCCP TRG CHIEF CMPLNT
Subjective   Patient ID: Rena is a 58 year old female.    Chief Complaint   Patient presents with   • Sinus Problem     Onset:2-3 weeks   Sxs:heavy eyes/eyelids ,nasal drainage ( clear )   Meds/remedies used:Sudafed ( last dose was 30 mins ago )   Pt was taking Clindamycin for dental purpose  ( last dose was on 2/12/23)   • Coronavirus Vaccine     YES  3 doses-Pfizer   1 dose -Bivalent    • Other     Flu vaccine:YES (10/18/22)  Covid exposure in the past 14 days:YES ( at [facility] , last contact 2/13/23)  Covid testing in the past 90 days:NO  Traveling in the past 30 days:NO  Letter for school/work that pt was evaluated in [facility]:NO     • Headache     Pain lvl:5/10 (now)   • Nausea   • Dizziness     When pt bends   Feels like room is spinning      Pt with sinus pressure maxillary and frontal  Has had nasal drinage, congestion for the last 2-3 weeks  Was on antibiotic for dental procedure, finished 4 days ago  Now having nausea, dizziness,  severe pressure when turning head or bending head over  Taking Sudafed          Past Medical History:   Diagnosis Date   • Allergic rhinitis    • Depression    • DM (diabetes mellitus), type 2 (CMS/HCC)     with long term Insulin use   • History of creation of ostomy (CMS/HCC)    • HTN (hypertension)    • Hydroureter    • Hyperlipidemia    • Hypothyroidism    • Leiomyoma of uterus    • Obesity    • Renal colic    • Ulcerative colitis (CMS/HCC)          ALLERGIES:  ALLERGIES:   Allergen Reactions   • Amoxicillin RASH       PAST SURGICAL HISTORY:  Past Surgical History:   Procedure Laterality Date   • Breast lumpectomy     • Cholecystectomy     • Laparoscopy, surgical, ablation of uterine fibroid(s)        • Ovarian cyst removal     • Total colectomy         FAMILY HISTORY:  Family History   Problem Relation Age of Onset   • Hypertension Mother    • Hypertension Father    • Diabetes Father        SOCIAL HISTORY:  Social History     Tobacco Use   • Smoking status: Never   • Smokeless  tobacco: Never   Substance Use Topics   • Alcohol use: No   • Drug use: No       Review of Systems   HENT: Positive for congestion and sinus pressure.    Gastrointestinal: Positive for nausea.   Neurological: Positive for dizziness.   All other systems reviewed and are negative.       Objective   Physical Exam  Vitals and nursing note reviewed.   Constitutional:       Appearance: Normal appearance. She is normal weight.   HENT:      Head: Normocephalic.      Right Ear: Tympanic membrane, ear canal and external ear normal.      Left Ear: Tympanic membrane, ear canal and external ear normal.      Nose: Congestion present.      Right Sinus: Maxillary sinus tenderness and frontal sinus tenderness present.      Left Sinus: Maxillary sinus tenderness and frontal sinus tenderness present.   Eyes:      Extraocular Movements: Extraocular movements intact.      Conjunctiva/sclera: Conjunctivae normal.      Pupils: Pupils are equal, round, and reactive to light.   Cardiovascular:      Rate and Rhythm: Normal rate and regular rhythm.      Heart sounds: Normal heart sounds.   Pulmonary:      Effort: Pulmonary effort is normal.      Breath sounds: Normal breath sounds.   Musculoskeletal:         General: Normal range of motion.   Skin:     General: Skin is warm.   Neurological:      General: No focal deficit present.      Mental Status: She is alert and oriented to person, place, and time.   Psychiatric:         Mood and Affect: Mood normal.         Behavior: Behavior normal.         Thought Content: Thought content normal.         Judgment: Judgment normal.       Visit Vitals  BP (!) 156/80 (BP Location: RUE - Right upper extremity, Patient Position: Sitting, Cuff Size: Large Adult)   Pulse 88   Temp 98.2 °F (36.8 °C) (Oral)   Ht 5' 4\" (1.626 m)   Wt 123.4 kg (272 lb 0.8 oz)   SpO2 97%   BMI 46.70 kg/m²       Results for orders placed or performed in visit on 02/15/23   POCT COVID/FLU/RSV PANEL   Result Value    POCT Rapid  SARS-COV-2 by PCR Not Detected    POCT Influenza A by PCR Not Detected    POCT Influenza B By PCR Not Detected    RSV By PCR Not Detected        Problem List Items Addressed This Visit    None  Visit Diagnoses     Sinus drainage    -  Primary    Relevant Orders    POCT COVID/FLU/RSV PANEL (Completed)    Acute maxillary sinusitis, recurrence not specified        Relevant Medications    doxycycline hyclate (VIBRA-TABS) 100 MG tablet         Sinus drainage  (primary encounter diagnosis)  Plan: POCT COVID/FLU/RSV PANEL    Acute maxillary sinusitis, recurrence not specified  Plan: doxycycline hyclate (VIBRA-TABS) 100 MG tablet  Pt has a medrol dos reta at home that she will take along with this.      Instructions provided as documented in the AVS.    The plan was discussed verbally and key components were summarized in the discharge instructions and printed on the AVS. All questions were answered. In discussing management and follow-up, they are advised that the plan is based only on information available at the time of this evaluation. Additional testing may be necessary, and outpatient evaluation is frequently required to ensure complete care. At the same time, there is always potential for symptoms to recur or worsen, or for additional signs or symptoms to develop that could substantially affect the treatment plan. If any new or uncontrolled symptoms occur, or if there are any other concerns, they are advised to seek medical evaluation immediately.    Thank you for visiting [facility].   boris in throat

## 2023-09-18 NOTE — ED ADULT NURSE NOTE - NS_SISCREENINGSR_GEN_ALL_ED
Patient states that last week she went to an [place] walk in clinic last week for an infection in her eyes. They put her on some eyedrops which she started on Friday and as of now she is not feeling any relief in the symptoms. She would like to discuss this with a nurse. Please advise [phone]   Negative

## 2023-10-31 NOTE — DISCHARGE NOTE PROVIDER - NSDCQMAMI_CARD_ALL_CORE
Patient called requesting prescription for nebulizer. Reports that she used it nightly during hospitalization from 10/25 /23 to 10/29/23 and it was helpful.   Pt would like script sent to [facility]. Pt would like the nebulizer mailed to her home. Gave pt the phone number to [facility] to request mail order.   Please fax script to [phone]    Herbert 'ed up and routing to PCP for review.     Leah Link RN    
Printed and faxed to number below.  
No

## 2024-01-04 ENCOUNTER — APPOINTMENT (OUTPATIENT)
Dept: OTOLARYNGOLOGY | Facility: CLINIC | Age: 64
End: 2024-01-04
Payer: COMMERCIAL

## 2024-01-04 VITALS — HEIGHT: 67 IN | BODY MASS INDEX: 36.1 KG/M2 | WEIGHT: 230 LBS

## 2024-01-04 DIAGNOSIS — Z82.3 FAMILY HISTORY OF STROKE: ICD-10-CM

## 2024-01-04 DIAGNOSIS — R05.9 COUGH, UNSPECIFIED: ICD-10-CM

## 2024-01-04 DIAGNOSIS — Z80.9 FAMILY HISTORY OF MALIGNANT NEOPLASM, UNSPECIFIED: ICD-10-CM

## 2024-01-04 DIAGNOSIS — H69.90 UNSPECIFIED EUSTACHIAN TUBE DISORDER, UNSPECIFIED EAR: ICD-10-CM

## 2024-01-04 DIAGNOSIS — Z78.9 OTHER SPECIFIED HEALTH STATUS: ICD-10-CM

## 2024-01-04 DIAGNOSIS — Z86.39 PERSONAL HISTORY OF OTHER ENDOCRINE, NUTRITIONAL AND METABOLIC DISEASE: ICD-10-CM

## 2024-01-04 DIAGNOSIS — Z83.3 FAMILY HISTORY OF DIABETES MELLITUS: ICD-10-CM

## 2024-01-04 PROCEDURE — 31231 NASAL ENDOSCOPY DX: CPT

## 2024-01-04 PROCEDURE — 99203 OFFICE O/P NEW LOW 30 MIN: CPT | Mod: 25

## 2024-01-04 RX ORDER — EMPAGLIFLOZIN AND METFORMIN HYDROCHLORIDE 12.5; 1 MG/1; MG/1
TABLET ORAL
Refills: 0 | Status: ACTIVE | COMMUNITY

## 2024-01-04 RX ORDER — AMOXICILLIN AND CLAVULANATE POTASSIUM 875; 125 MG/1; MG/1
875-125 TABLET, COATED ORAL
Qty: 20 | Refills: 0 | Status: ACTIVE | COMMUNITY
Start: 2024-01-04 | End: 1900-01-01

## 2024-01-04 NOTE — ASSESSMENT
[FreeTextEntry1] : She has a bacterial sinusitis most likely after URI. We discussed observation, imaging versus treatment. She would opt for treatment. She will be treated with Augmentin. Flonase. 3 days of Afrin. Mucinex. If she is not improving she may require imaging of her sinuses and/or a pulmonary workup.

## 2024-01-04 NOTE — PROCEDURE
[FreeTextEntry6] : PROCEDURE NOTES  PROCEDURE: Nasal endoscopy  SURGEON: Dr. Díaz INDICATIONS: Assess for chronic sinusitis.  ANESTHESIA: The patient was placed in a sitting position.  Following application of the topical anesthetic and decongestant, exam was performed with a zero degree endoscope.  The scope was passed along the right nasal floor to the nasopharynx.  It was then passed into the region of the middle meatus, middle turbinate, and sphenoethmoid region.  An identical procedure was performed on the left side.  The following findings were noted:  The nasal mucosa was healthy appearing and the septum was roughly midline. The middle meatus and sphenoethmoid recesses were clear bilaterally. The Superior meatus was without lesion or infection.  The Inferior, Middle and Superior Turbinates were not enlarged and healthy in appearance.  There was not pus, polyps or mass.  The nasopharynx was normal.

## 2024-03-26 ENCOUNTER — APPOINTMENT (OUTPATIENT)
Dept: OTOLARYNGOLOGY | Facility: CLINIC | Age: 64
End: 2024-03-26
Payer: COMMERCIAL

## 2024-03-26 DIAGNOSIS — J31.0 CHRONIC RHINITIS: ICD-10-CM

## 2024-03-26 PROCEDURE — 99212 OFFICE O/P EST SF 10 MIN: CPT | Mod: 25

## 2024-03-26 PROCEDURE — 31231 NASAL ENDOSCOPY DX: CPT

## 2024-03-26 NOTE — ED ADULT NURSE NOTE - NS ED NURSE DC INFO COMPLEXITY
Discharged Simple: Patient demonstrates quick and easy understanding/Patient asked questions/Verbalized Understanding

## 2024-03-26 NOTE — ASSESSMENT
[FreeTextEntry1] : By history it appears that she had a URI. I explained to her that this is not the usual age to develop allergies. Furthermore it is not seasonal allergy time in [place] at this time. Recommend Flonase. Follow-up on an as-needed basis.

## 2024-03-26 NOTE — HISTORY OF PRESENT ILLNESS
[de-identified] : She presents today for what she feels is "seasonal allergies". She reports she was in her otherwise state of fine health when approximately 1 week ago she reports that when the weather got warmer she had watery eyes sneezing and nasal congestion bilaterally. The symptoms have largely resolved with the exception that she is now having persistent right-sided nasal congestion. She never had a fever. She does not have a history of allergies.

## 2024-03-28 ENCOUNTER — EMERGENCY (EMERGENCY)
Facility: HOSPITAL | Age: 64
LOS: 0 days | Discharge: HOME | End: 2024-03-28
Attending: STUDENT IN AN ORGANIZED HEALTH CARE EDUCATION/TRAINING PROGRAM
Payer: COMMERCIAL

## 2024-03-28 VITALS
RESPIRATION RATE: 18 BRPM | HEART RATE: 65 BPM | SYSTOLIC BLOOD PRESSURE: 115 MMHG | TEMPERATURE: 98 F | DIASTOLIC BLOOD PRESSURE: 76 MMHG | OXYGEN SATURATION: 97 %

## 2024-03-28 VITALS
DIASTOLIC BLOOD PRESSURE: 68 MMHG | TEMPERATURE: 98 F | WEIGHT: 250 LBS | HEART RATE: 76 BPM | RESPIRATION RATE: 18 BRPM | SYSTOLIC BLOOD PRESSURE: 112 MMHG | OXYGEN SATURATION: 99 % | HEIGHT: 67 IN

## 2024-03-28 DIAGNOSIS — Z90.710 ACQUIRED ABSENCE OF BOTH CERVIX AND UTERUS: Chronic | ICD-10-CM

## 2024-03-28 DIAGNOSIS — Z98.890 OTHER SPECIFIED POSTPROCEDURAL STATES: ICD-10-CM

## 2024-03-28 DIAGNOSIS — M25.561 PAIN IN RIGHT KNEE: ICD-10-CM

## 2024-03-28 DIAGNOSIS — E11.9 TYPE 2 DIABETES MELLITUS WITHOUT COMPLICATIONS: ICD-10-CM

## 2024-03-28 DIAGNOSIS — Z98.891 HISTORY OF UTERINE SCAR FROM PREVIOUS SURGERY: Chronic | ICD-10-CM

## 2024-03-28 DIAGNOSIS — M25.461 EFFUSION, RIGHT KNEE: ICD-10-CM

## 2024-03-28 LAB
ALBUMIN SERPL ELPH-MCNC: 3.7 G/DL — SIGNIFICANT CHANGE UP (ref 3.3–5)
ALP SERPL-CCNC: 78 U/L — SIGNIFICANT CHANGE UP (ref 40–120)
ALT FLD-CCNC: 28 U/L — SIGNIFICANT CHANGE UP (ref 12–78)
ANION GAP SERPL CALC-SCNC: 7 MMOL/L — SIGNIFICANT CHANGE UP (ref 5–17)
AST SERPL-CCNC: 21 U/L — SIGNIFICANT CHANGE UP (ref 15–37)
BASOPHILS # BLD AUTO: 0.04 K/UL — SIGNIFICANT CHANGE UP (ref 0–0.2)
BASOPHILS NFR BLD AUTO: 0.6 % — SIGNIFICANT CHANGE UP (ref 0–2)
BILIRUB SERPL-MCNC: 0.4 MG/DL — SIGNIFICANT CHANGE UP (ref 0.2–1.2)
BUN SERPL-MCNC: 12 MG/DL — SIGNIFICANT CHANGE UP (ref 7–23)
CALCIUM SERPL-MCNC: 9.4 MG/DL — SIGNIFICANT CHANGE UP (ref 8.5–10.1)
CHLORIDE SERPL-SCNC: 108 MMOL/L — SIGNIFICANT CHANGE UP (ref 96–108)
CO2 SERPL-SCNC: 25 MMOL/L — SIGNIFICANT CHANGE UP (ref 22–31)
CREAT SERPL-MCNC: 0.72 MG/DL — SIGNIFICANT CHANGE UP (ref 0.5–1.3)
EGFR: 93 ML/MIN/1.73M2 — SIGNIFICANT CHANGE UP
EOSINOPHIL # BLD AUTO: 0.07 K/UL — SIGNIFICANT CHANGE UP (ref 0–0.5)
EOSINOPHIL NFR BLD AUTO: 1.1 % — SIGNIFICANT CHANGE UP (ref 0–6)
GLUCOSE SERPL-MCNC: 176 MG/DL — HIGH (ref 70–99)
HCT VFR BLD CALC: 43.2 % — SIGNIFICANT CHANGE UP (ref 34.5–45)
HGB BLD-MCNC: 13.8 G/DL — SIGNIFICANT CHANGE UP (ref 11.5–15.5)
IMM GRANULOCYTES NFR BLD AUTO: 0.2 % — SIGNIFICANT CHANGE UP (ref 0–0.9)
LYMPHOCYTES # BLD AUTO: 4.03 K/UL — HIGH (ref 1–3.3)
LYMPHOCYTES # BLD AUTO: 61.1 % — HIGH (ref 13–44)
MCHC RBC-ENTMCNC: 27.3 PG — SIGNIFICANT CHANGE UP (ref 27–34)
MCHC RBC-ENTMCNC: 31.9 G/DL — LOW (ref 32–36)
MCV RBC AUTO: 85.4 FL — SIGNIFICANT CHANGE UP (ref 80–100)
MONOCYTES # BLD AUTO: 0.42 K/UL — SIGNIFICANT CHANGE UP (ref 0–0.9)
MONOCYTES NFR BLD AUTO: 6.4 % — SIGNIFICANT CHANGE UP (ref 2–14)
NEUTROPHILS # BLD AUTO: 2.03 K/UL — SIGNIFICANT CHANGE UP (ref 1.8–7.4)
NEUTROPHILS NFR BLD AUTO: 30.6 % — LOW (ref 43–77)
NRBC # BLD: 0 /100 WBCS — SIGNIFICANT CHANGE UP (ref 0–0)
PLATELET # BLD AUTO: 253 K/UL — SIGNIFICANT CHANGE UP (ref 150–400)
POTASSIUM SERPL-MCNC: 4.2 MMOL/L — SIGNIFICANT CHANGE UP (ref 3.5–5.3)
POTASSIUM SERPL-SCNC: 4.2 MMOL/L — SIGNIFICANT CHANGE UP (ref 3.5–5.3)
PROT SERPL-MCNC: 8.4 GM/DL — HIGH (ref 6–8.3)
RBC # BLD: 5.06 M/UL — SIGNIFICANT CHANGE UP (ref 3.8–5.2)
RBC # FLD: 14.1 % — SIGNIFICANT CHANGE UP (ref 10.3–14.5)
SODIUM SERPL-SCNC: 140 MMOL/L — SIGNIFICANT CHANGE UP (ref 135–145)
URATE SERPL-MCNC: 3.6 MG/DL — SIGNIFICANT CHANGE UP (ref 2.5–7)
WBC # BLD: 6.74 K/UL — SIGNIFICANT CHANGE UP (ref 3.8–10.5)
WBC # FLD AUTO: 6.74 K/UL — SIGNIFICANT CHANGE UP (ref 3.8–10.5)

## 2024-03-28 PROCEDURE — 73562 X-RAY EXAM OF KNEE 3: CPT | Mod: 26,RT

## 2024-03-28 PROCEDURE — 99284 EMERGENCY DEPT VISIT MOD MDM: CPT

## 2024-03-28 RX ORDER — KETOROLAC TROMETHAMINE 30 MG/ML
15 SYRINGE (ML) INJECTION ONCE
Refills: 0 | Status: DISCONTINUED | OUTPATIENT
Start: 2024-03-28 | End: 2024-03-28

## 2024-03-28 RX ORDER — DICLOFENAC SODIUM 75 MG/1
1 TABLET, DELAYED RELEASE ORAL
Qty: 12 | Refills: 0
Start: 2024-03-28 | End: 2024-03-31

## 2024-03-28 RX ADMIN — Medication 40 MILLIGRAM(S): at 08:04

## 2024-03-28 RX ADMIN — Medication 15 MILLIGRAM(S): at 08:05

## 2024-03-28 NOTE — ED ADULT TRIAGE NOTE - CHIEF COMPLAINT QUOTE
Patient BIBA: patient reports Pain to right knee since yesterday. Use walker to ambulate. No relief with pain medication. pain 9 at rest 10 with movement or weight bearing.  PMH: Knee surgery; DM.

## 2024-03-28 NOTE — ED PROVIDER NOTE - NSFOLLOWUPINSTRUCTIONS_ED_ALL_ED_FT
Rest and elevate your knee.  Apply ice 20 minutes on 2-3 times/day as needed for pain or swelling. Take diclofenac every 8 hr with food as needed for pain.  Keep ace wrap on during the day for comfort and support. Use crutches or cane provided for you as needed to assist with ambulation. Follow up with the Orthopedist doctor within the week for further evaluation- referral list given.   Any worsening pain, swelling, weakness, numbness, redness, warmth, fever or any NEW CONCERNING symptoms return to the Emergency Dept.

## 2024-03-28 NOTE — ED PROVIDER NOTE - OBJECTIVE STATEMENT
65 y/o F with PMH DM, knee surgery, presenting to the ED c/o R knee pain. Patient is normally ambulatory without assistance and very active. States she began to experience R knee pain that began yesterday. Pain is worse with bearing weight. She tried using diclofenac with mild relief. No fever, chills, N/V. No recent trauma or injuries. She previously fell in October but has been ambulating well since. She reports hx of prior surgery on her knee.

## 2024-03-28 NOTE — ED PROVIDER NOTE - CARE PROVIDER_API CALL
Saurabh Coon  Orthopaedic Surgery  [address] [place] [address]  Phone: [phone]  Fax: [phone]  Follow Up Time: 1-3 Days

## 2024-03-28 NOTE — ED ADULT NURSE NOTE - NSFALLHARMRISKINTERV_ED_ALL_ED

## 2024-03-28 NOTE — ED PROVIDER NOTE - CHIEF COMPLAINT
[facility] CARE COORDINATION  [address]  [facility] [address]    May 1, 2020    Crystal Rose  [address] [facility] [address]      Dear Crystal,    I am a clinic care coordinator who works with SHERIE Bennett CNP at [facility]. I have been trying to reach you recently to introduce Clinic Care Coordination and to see if there was anything I could assist you with.  Below is a description of clinic care coordination and how I can further assist you.      The clinic care coordination team is made up of a registered nurse,  and community health worker who understand the health care system. The goal of clinic care coordination is to help you manage your health and improve access to the health care system in the most efficient manner. The team can assist you in meeting your health care goals by providing education, coordinating services, strengthening the communication among your providers and supporting you with any resource needs.    Please feel free to contact me at [phone] with any questions or concerns. We are focused on providing you with the highest-quality healthcare experience possible and that all starts with you.     Sincerely,     EFFIE Denton  Primary Care Clinic- Social Work Care Coordinator  [facility]  Ph: [phone]     The patient is a 64y Female complaining of knee pain/injury.

## 2024-03-28 NOTE — ED ADULT NURSE NOTE - OBJECTIVE STATEMENT
Pt is a 63yo Female AAOx4 NKDA pmh dmII left lumpectomy total hysterectomy pw right knee pain 10/10 since yesterday. Pt reports pain is worse when trying to bear weight. Pt denies nausea, vomiting, numbness/tingling to the extremities, headache, fever, cough,  chest pain, and SOB at this time. Pt updated on plan of care.

## 2024-03-28 NOTE — ED ADULT NURSE REASSESSMENT NOTE - NS ED NURSE REASSESS COMMENT FT1
received pt from previous RN, pt AAOX4 breathing w/ ease on RA complaining of R knee pain - medicated and IV inserted as ordered. Awaiting further orders, will continue to monitor.

## 2024-03-28 NOTE — ED PROVIDER NOTE - CLINICAL SUMMARY MEDICAL DECISION MAKING FREE TEXT BOX
65 y/o F with PMH DM, knee surgery, presenting to the ED c/o R knee pain.   Vitals stable.  She is well appearing in NAD.  R knee w/ full ROM with some pain  Will obtain XR  Check basic labs/send uric acid  Low suspicion for septic joint, afebrile, swelling is mild, full ROM  Will dose steroids/toradol for pain  Provide cane  Ortho f/u

## 2024-03-28 NOTE — ED PROVIDER NOTE - PATIENT PORTAL LINK FT
You can access the FollowMyHealth Patient Portal offered by [facility] by registering at the following website: [email or web address]. By joining [employer]’s FollowMyHealth portal, you will also be able to view your health information using other applications (apps) compatible with our system.

## 2024-03-28 NOTE — ED PROVIDER NOTE - PHYSICAL EXAMINATION
GENERAL: Awake, alert, NAD  HEENT: NC/AT, moist mucous membranes  LUNGS: CTAB, no wheezes or crackles   CARDIAC: RRR, no m/r/g  EXT: R knee minimally swollen, full ROM with some mild pain, there is mild tenderness to palpation to the inferior aspect of the R knee, distal pulses/sensory intact   NEURO: A&Ox3. Moving all extremities.  SKIN: Warm and dry. No rash.  PSYCH: Normal affect.

## 2024-05-17 NOTE — ED PROVIDER NOTE - NS_EDPROVIDERDISPOUSERTYPE_ED_A_ED
What Type Of Note Output Would You Prefer (Optional)?: Bullet Format What Is The Reason For Today's Visit?: Full Body Skin Examination What Is The Reason For Today's Visit? (Being Monitored For X): the development of new lesions Additional History: Last eye exam, possible 2011 or 2012. Scribe Attestation (For Scribes USE Only)...

## 2024-11-28 ENCOUNTER — INPATIENT (INPATIENT)
Facility: HOSPITAL | Age: 64
LOS: 7 days | Discharge: HOME | End: 2024-12-06
Attending: STUDENT IN AN ORGANIZED HEALTH CARE EDUCATION/TRAINING PROGRAM | Admitting: STUDENT IN AN ORGANIZED HEALTH CARE EDUCATION/TRAINING PROGRAM
Payer: COMMERCIAL

## 2024-11-28 VITALS
WEIGHT: 240.08 LBS | HEART RATE: 85 BPM | SYSTOLIC BLOOD PRESSURE: 159 MMHG | DIASTOLIC BLOOD PRESSURE: 98 MMHG | RESPIRATION RATE: 17 BRPM | HEIGHT: 67 IN | TEMPERATURE: 98 F | OXYGEN SATURATION: 96 %

## 2024-11-28 DIAGNOSIS — Z98.891 HISTORY OF UTERINE SCAR FROM PREVIOUS SURGERY: Chronic | ICD-10-CM

## 2024-11-28 DIAGNOSIS — Z90.710 ACQUIRED ABSENCE OF BOTH CERVIX AND UTERUS: Chronic | ICD-10-CM

## 2024-11-28 DIAGNOSIS — Z98.890 OTHER SPECIFIED POSTPROCEDURAL STATES: Chronic | ICD-10-CM

## 2024-11-28 LAB
ALBUMIN SERPL ELPH-MCNC: 4.3 G/DL — SIGNIFICANT CHANGE UP (ref 3.3–5)
ALP SERPL-CCNC: 96 U/L — SIGNIFICANT CHANGE UP (ref 40–120)
ALT FLD-CCNC: 38 U/L — SIGNIFICANT CHANGE UP (ref 12–78)
ANION GAP SERPL CALC-SCNC: 11 MMOL/L — SIGNIFICANT CHANGE UP (ref 5–17)
AST SERPL-CCNC: 30 U/L — SIGNIFICANT CHANGE UP (ref 15–37)
BASE EXCESS BLDV CALC-SCNC: 8.9 MMOL/L — HIGH (ref -2–3)
BASOPHILS # BLD AUTO: 0 K/UL — SIGNIFICANT CHANGE UP (ref 0–0.2)
BASOPHILS NFR BLD AUTO: 0 % — SIGNIFICANT CHANGE UP (ref 0–2)
BILIRUB SERPL-MCNC: 0.5 MG/DL — SIGNIFICANT CHANGE UP (ref 0.2–1.2)
BLOOD GAS COMMENTS, VENOUS: SIGNIFICANT CHANGE UP
BUN SERPL-MCNC: 10 MG/DL — SIGNIFICANT CHANGE UP (ref 7–23)
CALCIUM SERPL-MCNC: 10.1 MG/DL — SIGNIFICANT CHANGE UP (ref 8.5–10.1)
CHLORIDE SERPL-SCNC: 99 MMOL/L — SIGNIFICANT CHANGE UP (ref 96–108)
CO2 BLDV-SCNC: 35 MMOL/L — HIGH (ref 22–26)
CO2 SERPL-SCNC: 27 MMOL/L — SIGNIFICANT CHANGE UP (ref 22–31)
CREAT SERPL-MCNC: 0.86 MG/DL — SIGNIFICANT CHANGE UP (ref 0.5–1.3)
DACRYOCYTES BLD QL SMEAR: SLIGHT — SIGNIFICANT CHANGE UP
EGFR: 75 ML/MIN/1.73M2 — SIGNIFICANT CHANGE UP
EOSINOPHIL # BLD AUTO: 0 K/UL — SIGNIFICANT CHANGE UP (ref 0–0.5)
EOSINOPHIL NFR BLD AUTO: 0 % — SIGNIFICANT CHANGE UP (ref 0–6)
GAS PNL BLDV: SIGNIFICANT CHANGE UP
GLUCOSE SERPL-MCNC: 234 MG/DL — HIGH (ref 70–99)
HCO3 BLDV-SCNC: 34 MMOL/L — HIGH (ref 22–28)
HCT VFR BLD CALC: 47.8 % — HIGH (ref 34.5–45)
HGB BLD-MCNC: 15.5 G/DL — SIGNIFICANT CHANGE UP (ref 11.5–15.5)
HOROWITZ INDEX BLDV+IHG-RTO: SIGNIFICANT CHANGE UP
LIDOCAIN IGE QN: 20 U/L — SIGNIFICANT CHANGE UP (ref 13–75)
LYMPHOCYTES # BLD AUTO: 32 % — SIGNIFICANT CHANGE UP (ref 13–44)
LYMPHOCYTES # BLD AUTO: 4.14 K/UL — HIGH (ref 1–3.3)
MANUAL SMEAR VERIFICATION: SIGNIFICANT CHANGE UP
MCHC RBC-ENTMCNC: 27 PG — SIGNIFICANT CHANGE UP (ref 27–34)
MCHC RBC-ENTMCNC: 32.4 G/DL — SIGNIFICANT CHANGE UP (ref 32–36)
MCV RBC AUTO: 83.3 FL — SIGNIFICANT CHANGE UP (ref 80–100)
MONOCYTES # BLD AUTO: 0.39 K/UL — SIGNIFICANT CHANGE UP (ref 0–0.9)
MONOCYTES NFR BLD AUTO: 3 % — SIGNIFICANT CHANGE UP (ref 2–14)
NEUTROPHILS # BLD AUTO: 7.51 K/UL — HIGH (ref 1.8–7.4)
NEUTROPHILS NFR BLD AUTO: 58 % — SIGNIFICANT CHANGE UP (ref 43–77)
NRBC # BLD: 0 /100 WBCS — SIGNIFICANT CHANGE UP (ref 0–0)
NRBC # BLD: SIGNIFICANT CHANGE UP /100 WBCS (ref 0–0)
PCO2 BLDV: 44 MMHG — SIGNIFICANT CHANGE UP (ref 42–55)
PH BLDV: 7.49 — HIGH (ref 7.32–7.43)
PLAT MORPH BLD: NORMAL — SIGNIFICANT CHANGE UP
PLATELET # BLD AUTO: 299 K/UL — SIGNIFICANT CHANGE UP (ref 150–400)
PO2 BLDV: 58 MMHG — HIGH (ref 25–45)
POTASSIUM SERPL-MCNC: 4.1 MMOL/L — SIGNIFICANT CHANGE UP (ref 3.5–5.3)
POTASSIUM SERPL-SCNC: 4.1 MMOL/L — SIGNIFICANT CHANGE UP (ref 3.5–5.3)
PROT SERPL-MCNC: 9.3 GM/DL — HIGH (ref 6–8.3)
RBC # BLD: 5.74 M/UL — HIGH (ref 3.8–5.2)
RBC # FLD: 13.9 % — SIGNIFICANT CHANGE UP (ref 10.3–14.5)
RBC BLD AUTO: NORMAL — SIGNIFICANT CHANGE UP
SAO2 % BLDV: 92 % — LOW (ref 94–98)
SODIUM SERPL-SCNC: 137 MMOL/L — SIGNIFICANT CHANGE UP (ref 135–145)
TROPONIN I, HIGH SENSITIVITY RESULT: <3 NG/L — SIGNIFICANT CHANGE UP
VARIANT LYMPHS # BLD: 7 % — HIGH (ref 0–6)
WBC # BLD: 12.95 K/UL — HIGH (ref 3.8–10.5)
WBC # FLD AUTO: 12.95 K/UL — HIGH (ref 3.8–10.5)

## 2024-11-28 PROCEDURE — 99285 EMERGENCY DEPT VISIT HI MDM: CPT

## 2024-11-28 PROCEDURE — 93010 ELECTROCARDIOGRAM REPORT: CPT

## 2024-11-28 PROCEDURE — 74177 CT ABD & PELVIS W/CONTRAST: CPT | Mod: 26,MC

## 2024-11-28 PROCEDURE — 99223 1ST HOSP IP/OBS HIGH 75: CPT

## 2024-11-28 RX ORDER — BENZOCAINE 10 %
1 OINTMENT (GRAM) TOPICAL
Refills: 0 | Status: DISCONTINUED | OUTPATIENT
Start: 2024-11-28 | End: 2024-12-06

## 2024-11-28 RX ORDER — FAMOTIDINE 20 MG/1
20 TABLET, FILM COATED ORAL ONCE
Refills: 0 | Status: COMPLETED | OUTPATIENT
Start: 2024-11-28 | End: 2024-11-28

## 2024-11-28 RX ORDER — 0.9 % SODIUM CHLORIDE 0.9 %
1000 INTRAVENOUS SOLUTION INTRAVENOUS ONCE
Refills: 0 | Status: COMPLETED | OUTPATIENT
Start: 2024-11-28 | End: 2024-11-29

## 2024-11-28 RX ORDER — 0.9 % SODIUM CHLORIDE 0.9 %
1000 INTRAVENOUS SOLUTION INTRAVENOUS
Refills: 0 | Status: DISCONTINUED | OUTPATIENT
Start: 2024-11-28 | End: 2024-12-05

## 2024-11-28 RX ORDER — KETOROLAC TROMETHAMINE 30 MG/ML
15 INJECTION INTRAMUSCULAR; INTRAVENOUS ONCE
Refills: 0 | Status: DISCONTINUED | OUTPATIENT
Start: 2024-11-28 | End: 2024-11-28

## 2024-11-28 RX ORDER — ONDANSETRON HYDROCHLORIDE 4 MG/1
4 TABLET, FILM COATED ORAL ONCE
Refills: 0 | Status: COMPLETED | OUTPATIENT
Start: 2024-11-28 | End: 2024-11-28

## 2024-11-28 RX ORDER — ENOXAPARIN SODIUM 30 MG/.3ML
40 INJECTION SUBCUTANEOUS EVERY 12 HOURS
Refills: 0 | Status: DISCONTINUED | OUTPATIENT
Start: 2024-11-28 | End: 2024-12-06

## 2024-11-28 RX ORDER — SODIUM CHLORIDE 9 MG/ML
1000 INJECTION, SOLUTION INTRAMUSCULAR; INTRAVENOUS; SUBCUTANEOUS ONCE
Refills: 0 | Status: COMPLETED | OUTPATIENT
Start: 2024-11-28 | End: 2024-11-28

## 2024-11-28 RX ORDER — 0.9 % SODIUM CHLORIDE 0.9 %
1000 INTRAVENOUS SOLUTION INTRAVENOUS
Refills: 0 | Status: DISCONTINUED | OUTPATIENT
Start: 2024-11-28 | End: 2024-11-30

## 2024-11-28 RX ORDER — GLUCAGON INJECTION, SOLUTION 0.5 MG/.1ML
1 INJECTION, SOLUTION SUBCUTANEOUS ONCE
Refills: 0 | Status: DISCONTINUED | OUTPATIENT
Start: 2024-11-28 | End: 2024-12-06

## 2024-11-28 RX ORDER — MAGNESIUM, ALUMINUM HYDROXIDE 200-225/5
30 SUSPENSION, ORAL (FINAL DOSE FORM) ORAL ONCE
Refills: 0 | Status: COMPLETED | OUTPATIENT
Start: 2024-11-28 | End: 2024-11-28

## 2024-11-28 RX ORDER — PANTOPRAZOLE SODIUM 40 MG/1
40 TABLET, DELAYED RELEASE ORAL DAILY
Refills: 0 | Status: DISCONTINUED | OUTPATIENT
Start: 2024-11-28 | End: 2024-12-06

## 2024-11-28 RX ADMIN — FAMOTIDINE 20 MILLIGRAM(S): 20 TABLET, FILM COATED ORAL at 19:52

## 2024-11-28 RX ADMIN — Medication 30 MILLILITER(S): at 20:56

## 2024-11-28 RX ADMIN — SODIUM CHLORIDE 1000 MILLILITER(S): 9 INJECTION, SOLUTION INTRAMUSCULAR; INTRAVENOUS; SUBCUTANEOUS at 19:52

## 2024-11-28 RX ADMIN — ONDANSETRON HYDROCHLORIDE 4 MILLIGRAM(S): 4 TABLET, FILM COATED ORAL at 19:55

## 2024-11-28 RX ADMIN — KETOROLAC TROMETHAMINE 15 MILLIGRAM(S): 30 INJECTION INTRAMUSCULAR; INTRAVENOUS at 19:53

## 2024-11-28 RX ADMIN — KETOROLAC TROMETHAMINE 15 MILLIGRAM(S): 30 INJECTION INTRAMUSCULAR; INTRAVENOUS at 20:53

## 2024-11-28 NOTE — ED ADULT NURSE NOTE - NSSUHOSCREENINGYN_ED_ALL_ED
No - the patient is unable to be screened due to medical condition 4 = No assist / stand by assistance

## 2024-11-28 NOTE — ED ADULT TRIAGE NOTE - CHIEF COMPLAINT QUOTE
BIBA with c/o sharp intermittent epigastric pain radiating to RUQ and LUQ x this morning. 3 BM today. Took tylenol this morning at 11am but vomited shortly after

## 2024-11-28 NOTE — ED ADULT NURSE NOTE - OBJECTIVE STATEMENT
63 yo F PMHx HTN, GERD, DM on sinjardy,allergy to metformin c/o sharp intermittent lower abd pain and umbilicus tenderness a/w nausea NBNB vomiting after eating this AM. Pt aox3, amb at [facility] but w difficulty dt pain. Abd appears nondistended and soft, bowel sounds apparent in all fields. 63 yo F PMHx HTN, GERD, DM on sinjardy,allergy to metformin c/o sharp intermittent lower abd pain and umbilicus tenderness a/w nausea NBNB vomiting after eating this AM. States made x3 BM today and is normal. Pt aox3, amb at bas[place] but w difficulty dt pain. Abd appears nondistended and soft, bowel sounds apparent in all fields.(-) fever, illness contact, recent travel, cough, diarrhea

## 2024-11-28 NOTE — ED PROVIDER NOTE - PHYSICAL EXAMINATION
General: No acute distress, mentation at baseline,  well nourished, well developed  HEENT: NCAT, Neck supple without meningismus, PERRL, no conjunctival injection  Lungs: CTAB, No wheeze or crackles, No retractions, No increased work of breathing  Heart: S1S2 RRR, No M/R/G, Pules equal Bilaterally in upper and lower extremities distally  Abd: soft, lower abd TTP, ND, No guarding, No rebound.  No hernias, no palpable masses.  Extrem: FROM in all joints, no gross deformities appreciated, no significant edema noted, No ulcers. Cap refil < 2sec.  Skin: No rash noted, warm dry.  Neuro:  Grossly normal.  No difficulty ambulating. No focal deficits.  Psychiatric: Appropriate mood and affect.

## 2024-11-28 NOTE — ED PROVIDER NOTE - CLINICAL SUMMARY MEDICAL DECISION MAKING FREE TEXT BOX
hx of hysterectomy, two C-sections p/w abd pain x 1day. associated N/V diarrhea. Patient denies headache, vision changes, eye pain, LOC, focal weakness/numbness, neck pain, fever, cough sore throat, chills, chest pain, palpitations, shortness of breath, wheezing, stridor, neck/back pain, other MSK pain, a constipation, blood in stool, urinary cmplaints, rash, trauma.     Differential diagnosis includes: gastroenteritis vs SBO vs colitis. Abdominal exam without peritoneal signs. No evidence of acute abdomen at this time. Well appearing. Low suspicion for acute hepatobiliary disease (includng acute cholecystitis), acute pancreatitis, PUD (including perforation), acute infectious processes (pneumonia, hepatitis, pyelonephritis), acute appendicitis, vascular catastrophe, or viscus perforation. Presentation not consistent with other acute, emergent causes of abdominal pain at this time.    Plan: labs, UA, CT AP, zofran IVF, pain control, serial reassessment     update: pt feeling better, SBO on CT, NG tube placed by surg PA, will admit to surgery

## 2024-11-29 LAB
A1C WITH ESTIMATED AVERAGE GLUCOSE RESULT: 10.8 % — HIGH (ref 4–5.6)
ANION GAP SERPL CALC-SCNC: 12 MMOL/L — SIGNIFICANT CHANGE UP (ref 5–17)
BUN SERPL-MCNC: 12 MG/DL — SIGNIFICANT CHANGE UP (ref 7–23)
CALCIUM SERPL-MCNC: 9.7 MG/DL — SIGNIFICANT CHANGE UP (ref 8.5–10.1)
CHLORIDE SERPL-SCNC: 102 MMOL/L — SIGNIFICANT CHANGE UP (ref 96–108)
CO2 SERPL-SCNC: 25 MMOL/L — SIGNIFICANT CHANGE UP (ref 22–31)
CREAT SERPL-MCNC: 0.75 MG/DL — SIGNIFICANT CHANGE UP (ref 0.5–1.3)
EGFR: 88 ML/MIN/1.73M2 — SIGNIFICANT CHANGE UP
ESTIMATED AVERAGE GLUCOSE: 263 MG/DL — HIGH (ref 68–114)
GLUCOSE BLDC GLUCOMTR-MCNC: 170 MG/DL — HIGH (ref 70–99)
GLUCOSE BLDC GLUCOMTR-MCNC: 175 MG/DL — HIGH (ref 70–99)
GLUCOSE BLDC GLUCOMTR-MCNC: 188 MG/DL — HIGH (ref 70–99)
GLUCOSE BLDC GLUCOMTR-MCNC: 190 MG/DL — HIGH (ref 70–99)
GLUCOSE SERPL-MCNC: 186 MG/DL — HIGH (ref 70–99)
HCT VFR BLD CALC: 45.9 % — HIGH (ref 34.5–45)
HGB BLD-MCNC: 14.4 G/DL — SIGNIFICANT CHANGE UP (ref 11.5–15.5)
MAGNESIUM SERPL-MCNC: 2.2 MG/DL — SIGNIFICANT CHANGE UP (ref 1.6–2.6)
MCHC RBC-ENTMCNC: 26.7 PG — LOW (ref 27–34)
MCHC RBC-ENTMCNC: 31.4 G/DL — LOW (ref 32–36)
MCV RBC AUTO: 85.2 FL — SIGNIFICANT CHANGE UP (ref 80–100)
NRBC # BLD: 0 /100 WBCS — SIGNIFICANT CHANGE UP (ref 0–0)
PHOSPHATE SERPL-MCNC: 5.2 MG/DL — HIGH (ref 2.5–4.5)
PLATELET # BLD AUTO: 296 K/UL — SIGNIFICANT CHANGE UP (ref 150–400)
POTASSIUM SERPL-MCNC: 3.6 MMOL/L — SIGNIFICANT CHANGE UP (ref 3.5–5.3)
POTASSIUM SERPL-SCNC: 3.6 MMOL/L — SIGNIFICANT CHANGE UP (ref 3.5–5.3)
RBC # BLD: 5.39 M/UL — HIGH (ref 3.8–5.2)
RBC # FLD: 14 % — SIGNIFICANT CHANGE UP (ref 10.3–14.5)
SODIUM SERPL-SCNC: 139 MMOL/L — SIGNIFICANT CHANGE UP (ref 135–145)
WBC # BLD: 10.01 K/UL — SIGNIFICANT CHANGE UP (ref 3.8–10.5)
WBC # FLD AUTO: 10.01 K/UL — SIGNIFICANT CHANGE UP (ref 3.8–10.5)

## 2024-11-29 PROCEDURE — 71045 X-RAY EXAM CHEST 1 VIEW: CPT | Mod: 26

## 2024-11-29 PROCEDURE — 99231 SBSQ HOSP IP/OBS SF/LOW 25: CPT

## 2024-11-29 PROCEDURE — 99284 EMERGENCY DEPT VISIT MOD MDM: CPT | Mod: 25

## 2024-11-29 PROCEDURE — 43752 NASAL/OROGASTRIC W/TUBE PLMT: CPT | Mod: 59

## 2024-11-29 RX ORDER — INFLUENZA VIRUS VACCINE 15; 15; 15; 15 UG/.5ML; UG/.5ML; UG/.5ML; UG/.5ML
0.5 SUSPENSION INTRAMUSCULAR ONCE
Refills: 0 | Status: DISCONTINUED | OUTPATIENT
Start: 2024-11-29 | End: 2024-12-06

## 2024-11-29 RX ORDER — ACETAMINOPHEN 500MG 500 MG/1
1000 TABLET, COATED ORAL ONCE
Refills: 0 | Status: COMPLETED | OUTPATIENT
Start: 2024-11-29 | End: 2024-11-29

## 2024-11-29 RX ADMIN — Medication 1 SPRAY(S): at 01:50

## 2024-11-29 RX ADMIN — ENOXAPARIN SODIUM 40 MILLIGRAM(S): 30 INJECTION SUBCUTANEOUS at 17:33

## 2024-11-29 RX ADMIN — Medication 1 SPRAY(S): at 23:10

## 2024-11-29 RX ADMIN — PANTOPRAZOLE SODIUM 40 MILLIGRAM(S): 40 TABLET, DELAYED RELEASE ORAL at 01:31

## 2024-11-29 RX ADMIN — Medication 1000 MILLILITER(S): at 01:31

## 2024-11-29 RX ADMIN — ENOXAPARIN SODIUM 40 MILLIGRAM(S): 30 INJECTION SUBCUTANEOUS at 06:08

## 2024-11-29 RX ADMIN — Medication 1 SPRAY(S): at 06:08

## 2024-11-29 RX ADMIN — ACETAMINOPHEN 500MG 400 MILLIGRAM(S): 500 TABLET, COATED ORAL at 11:23

## 2024-11-29 RX ADMIN — Medication 150 MILLILITER(S): at 06:07

## 2024-11-29 RX ADMIN — Medication 1: at 06:34

## 2024-11-29 RX ADMIN — PANTOPRAZOLE SODIUM 40 MILLIGRAM(S): 40 TABLET, DELAYED RELEASE ORAL at 11:23

## 2024-11-29 RX ADMIN — Medication 1 SPRAY(S): at 17:33

## 2024-11-29 RX ADMIN — Medication 150 MILLILITER(S): at 23:10

## 2024-11-29 RX ADMIN — ACETAMINOPHEN 500MG 1000 MILLIGRAM(S): 500 TABLET, COATED ORAL at 12:23

## 2024-11-29 NOTE — PATIENT PROFILE ADULT - FALL HARM RISK - HARM RISK INTERVENTIONS

## 2024-11-29 NOTE — PATIENT PROFILE ADULT - OVER THE PAST TWO WEEKS HAVE YOU FELT DOWN, DEPRESSED OR HOPELESS?
Pt called stating that he is concerned that his BP is low and at times he feels lightheaded.   Pt states that on 2/14 he had blood work and he did not hear anything about the results. He said he saw the results on his mychart and saw that his creatinine level jumped to 2.06 so he decided he should adjust his furosemide. He started on 2/25 taking Furosemide 10mg 5 days a week and Furosemide 20mg 2 days a week. Pt denies SOB and/or edema. Wt on 3/1- 176lbs todays wt 174lbs states weight has been stable.     BP readings:  3/21- 94/58 P69  3/22- 89/60 P77  3/23- 98/70 P74  3/24- 95/71 P80  3/25- 94/41 P58  3/26- 97/60 P79  3/27- 105/75 P77  3/28- 109/52 P45 took Pulse a few more times 43 and 48    Pt has questions in regards to why he is taking spironolactone?  He also is concerned about low pulses as he thought this was regulated with pacemaker. Also concerned about low Bps.    Please advise    no

## 2024-11-29 NOTE — H&P ADULT - HISTORY OF PRESENT ILLNESS
63 Y/O female with a PMHx of DM, HLD and a PSHx of  x2, hysterectomy presents to the ED for abdominal pain. States she was experiencing 2 days of constipation before eating some prunes and drinking coffee. She had a BM around 9AM and about an hour later started experiencing severe abdominal pain with associated nausea and vomiting about 4 times before she came to the ED. States she recently had a colonoscopy in 2024 to which a precancerous polyp was discovered at the ileocecal junction. She is currently scheduled for an advanced gastroenterology appointment on 2024 for removal. Denies fevers, chills, chest pain, SOB, hematuria, hematochezia.

## 2024-11-29 NOTE — H&P ADULT - NSICDXFAMHXNEG_GEN_ALL
asthma/atrial fibrillation/brain aneurysm/cancer/congestive heart failure/COPD/coronary disease/dementia/emphysema/heart disease/hypertension/irritable bowel syndrome/kidney disease/stroke/thyroid disease/VTE

## 2024-11-29 NOTE — H&P ADULT - NSHPPHYSICALEXAM_GEN_ALL_CORE
Physical Exam:  General:  Appears stated age, well-groomed, no distress  Eyes: EOMI, conjunctiva clear  HENT:  WNL, no JVD  Chest:  Clear breath sounds  Cardiovascular:  Regular rate & rhythm  Abdomen:  Obese abdomen, nondistended, + bowel sounds, TTP in midabdomen, previous  scar noted at midline, laparoscopic scars identified across abdomen   Extremities:  No pedal edema or calf tenderness noted  Skin:  No rash  Musculoskeletal:  Normal strength  Neuro/Psych:  Alert, oriented to time, place and person   : No CVA or suprapubic tenderness

## 2024-11-29 NOTE — H&P ADULT - ASSESSMENT
63 Y/O female with a PMHx of DM, HLD and a PSHx of  x2, hysterectomy presents to the ED for abdominal pain. Admitted with SBO undergoing conservative management with NPO and NGT to WS. Afebrile with leukocytosis. VSS. CT scan impressive for SBO with transition point in the distal ileum.       PLAN:     - Admit to general surgery under Dr. Ferreira   - NPO with IVF  - NGT to WS   - F/U AM labs   - DVT ppx; OOB/AAT  - GI ppx   - Discussed with Dr. Ferreira

## 2024-11-29 NOTE — PROGRESS NOTE ADULT - SUBJECTIVE AND OBJECTIVE BOX
Patient seen and examined bedside resting comfortably.  Pt complaining of abdominal pain, endorses small amount of flatus this morning.  NGT in place to LWS  Denies nausea and vomiting.  Denies chest pain, dyspnea, cough.    T(F): 98.7 (11-29-24 @ 05:11), Max: 99 (11-29-24 @ 02:35)  HR: 96 (11-29-24 @ 05:11) (85 - 96)  BP: 162/95 (11-29-24 @ 05:11) (123/93 - 170/91)  RR: 18 (11-29-24 @ 05:11) (17 - 19)  SpO2: 97% (11-29-24 @ 05:11) (94% - 97%)  Wt(kg): --  CAPILLARY BLOOD GLUCOSE      POCT Blood Glucose.: 175 mg/dL (29 Nov 2024 06:18)      PHYSICAL EXAM:  General: NAD  Neuro:  Alert & oriented x 3  HEENT: NCAT, EOMI, conjunctiva clear  CV: +S1+S2 regular rate and rhythm  Lung: respirations nonlabored, good inspiratory effort  Abdomen: obese, soft, mildly distended, +TTP to mid abdomen. NGT to LWS, with 350cc gastric output overnight  Extremities: no pedal edema or calf tenderness noted     LABS:                        14.4   10.01 )-----------( 296      ( 29 Nov 2024 06:32 )             45.9     11-29    139  |  102  |  12  ----------------------------<  186[H]  3.6   |  25  |  0.75    Ca    9.7      29 Nov 2024 06:32  Phos  5.2     11-29  Mg     2.2     11-29    TPro  9.3[H]  /  Alb  4.3  /  TBili  0.5  /  DBili  x   /  AST  30  /  ALT  38  /  AlkPhos  96  11-28        I&O:         Impression: 64y Female with SBO  NGT to LWS, with 350cc gastric output overnight    Plan as discussed with Dr. Ferreira:  - Continue NGT to LWS  - Continue NPO, IV hydration  - Encouraged ambulation  - Continue GI prophylaxis  - Continue VTE prophylaxis with lovenox

## 2024-11-30 LAB
ANION GAP SERPL CALC-SCNC: 9 MMOL/L — SIGNIFICANT CHANGE UP (ref 5–17)
BUN SERPL-MCNC: 16 MG/DL — SIGNIFICANT CHANGE UP (ref 7–23)
CALCIUM SERPL-MCNC: 9.1 MG/DL — SIGNIFICANT CHANGE UP (ref 8.5–10.1)
CHLORIDE SERPL-SCNC: 103 MMOL/L — SIGNIFICANT CHANGE UP (ref 96–108)
CO2 SERPL-SCNC: 26 MMOL/L — SIGNIFICANT CHANGE UP (ref 22–31)
CREAT SERPL-MCNC: 0.7 MG/DL — SIGNIFICANT CHANGE UP (ref 0.5–1.3)
EGFR: 96 ML/MIN/1.73M2 — SIGNIFICANT CHANGE UP
GLUCOSE BLDC GLUCOMTR-MCNC: 160 MG/DL — HIGH (ref 70–99)
GLUCOSE BLDC GLUCOMTR-MCNC: 166 MG/DL — HIGH (ref 70–99)
GLUCOSE BLDC GLUCOMTR-MCNC: 178 MG/DL — HIGH (ref 70–99)
GLUCOSE BLDC GLUCOMTR-MCNC: 196 MG/DL — HIGH (ref 70–99)
GLUCOSE SERPL-MCNC: 183 MG/DL — HIGH (ref 70–99)
HCT VFR BLD CALC: 44.4 % — SIGNIFICANT CHANGE UP (ref 34.5–45)
HGB BLD-MCNC: 14.1 G/DL — SIGNIFICANT CHANGE UP (ref 11.5–15.5)
MAGNESIUM SERPL-MCNC: 1.9 MG/DL — SIGNIFICANT CHANGE UP (ref 1.6–2.6)
MCHC RBC-ENTMCNC: 26.9 PG — LOW (ref 27–34)
MCHC RBC-ENTMCNC: 31.8 G/DL — LOW (ref 32–36)
MCV RBC AUTO: 84.6 FL — SIGNIFICANT CHANGE UP (ref 80–100)
NRBC # BLD: 0 /100 WBCS — SIGNIFICANT CHANGE UP (ref 0–0)
PHOSPHATE SERPL-MCNC: 2.8 MG/DL — SIGNIFICANT CHANGE UP (ref 2.5–4.5)
PLATELET # BLD AUTO: 267 K/UL — SIGNIFICANT CHANGE UP (ref 150–400)
POTASSIUM SERPL-MCNC: 3.4 MMOL/L — LOW (ref 3.5–5.3)
POTASSIUM SERPL-SCNC: 3.4 MMOL/L — LOW (ref 3.5–5.3)
RBC # BLD: 5.25 M/UL — HIGH (ref 3.8–5.2)
RBC # FLD: 14.4 % — SIGNIFICANT CHANGE UP (ref 10.3–14.5)
SODIUM SERPL-SCNC: 138 MMOL/L — SIGNIFICANT CHANGE UP (ref 135–145)
WBC # BLD: 9.93 K/UL — SIGNIFICANT CHANGE UP (ref 3.8–10.5)
WBC # FLD AUTO: 9.93 K/UL — SIGNIFICANT CHANGE UP (ref 3.8–10.5)

## 2024-11-30 PROCEDURE — 99231 SBSQ HOSP IP/OBS SF/LOW 25: CPT

## 2024-11-30 PROCEDURE — 74018 RADEX ABDOMEN 1 VIEW: CPT | Mod: 26,76

## 2024-11-30 RX ORDER — ACETAMINOPHEN 500MG 500 MG/1
1000 TABLET, COATED ORAL ONCE
Refills: 0 | Status: COMPLETED | OUTPATIENT
Start: 2024-11-30 | End: 2024-11-30

## 2024-11-30 RX ORDER — POTASSIUM CHLORIDE 600 MG/1
10 TABLET, EXTENDED RELEASE ORAL
Refills: 0 | Status: COMPLETED | OUTPATIENT
Start: 2024-11-30 | End: 2024-11-30

## 2024-11-30 RX ORDER — ELECTROLYTE-M SOLUTION/D5W 5 %
1000 INTRAVENOUS SOLUTION INTRAVENOUS
Refills: 0 | Status: DISCONTINUED | OUTPATIENT
Start: 2024-11-30 | End: 2024-12-05

## 2024-11-30 RX ADMIN — ENOXAPARIN SODIUM 40 MILLIGRAM(S): 30 INJECTION SUBCUTANEOUS at 05:42

## 2024-11-30 RX ADMIN — Medication 90 MILLILITER(S): at 12:41

## 2024-11-30 RX ADMIN — PANTOPRAZOLE SODIUM 40 MILLIGRAM(S): 40 TABLET, DELAYED RELEASE ORAL at 11:26

## 2024-11-30 RX ADMIN — ACETAMINOPHEN 500MG 1000 MILLIGRAM(S): 500 TABLET, COATED ORAL at 11:15

## 2024-11-30 RX ADMIN — POTASSIUM CHLORIDE 100 MILLIEQUIVALENT(S): 600 TABLET, EXTENDED RELEASE ORAL at 09:18

## 2024-11-30 RX ADMIN — Medication 1: at 17:11

## 2024-11-30 RX ADMIN — Medication 1: at 07:57

## 2024-11-30 RX ADMIN — ACETAMINOPHEN 500MG 400 MILLIGRAM(S): 500 TABLET, COATED ORAL at 10:30

## 2024-11-30 RX ADMIN — Medication 1: at 11:25

## 2024-11-30 RX ADMIN — Medication 150 MILLILITER(S): at 05:43

## 2024-11-30 RX ADMIN — Medication 140 MILLILITER(S): at 07:57

## 2024-11-30 RX ADMIN — Medication 1 SPRAY(S): at 11:33

## 2024-11-30 RX ADMIN — Medication 1 SPRAY(S): at 05:41

## 2024-11-30 RX ADMIN — ENOXAPARIN SODIUM 40 MILLIGRAM(S): 30 INJECTION SUBCUTANEOUS at 17:11

## 2024-11-30 RX ADMIN — POTASSIUM CHLORIDE 100 MILLIEQUIVALENT(S): 600 TABLET, EXTENDED RELEASE ORAL at 12:40

## 2024-11-30 RX ADMIN — POTASSIUM CHLORIDE 100 MILLIEQUIVALENT(S): 600 TABLET, EXTENDED RELEASE ORAL at 10:49

## 2024-11-30 NOTE — PROGRESS NOTE ADULT - SUBJECTIVE AND OBJECTIVE BOX
SURGERY PROGRESS HPI:  Pt seen and examined at bedside. Denies pain or complaints. Pt tolerating NGT. Pt denies nausea and vomiting. Passed flatus a few times. Pt denies chest pain, SOB, dizziness, fever, chills.     Vital Signs Last 24 Hrs  T(C): 36.4 (30 Nov 2024 05:11), Max: 36.8 (29 Nov 2024 11:54)  T(F): 97.5 (30 Nov 2024 05:11), Max: 98.3 (29 Nov 2024 11:54)  HR: 86 (30 Nov 2024 05:11) (84 - 92)  BP: 168/91 (30 Nov 2024 05:11) (140/78 - 168/91)  BP(mean): 117 (30 Nov 2024 05:11) (105 - 117)  RR: 18 (30 Nov 2024 05:11) (17 - 19)  SpO2: 96% (30 Nov 2024 05:11) (94% - 100%)    Parameters below as of 30 Nov 2024 05:11  Patient On (Oxygen Delivery Method): room air    PHYSICAL EXAM:    CONSTITUTIONAL: NAD  HEENT: NGT in place  RESPIRATORY: Clear to ausculation, bilaterally   CARDIOVASCULAR: RRR S1S2  GASTROINTESTINAL: non distended, +BS, soft, non tender, no guarding  MUSCULOSKELETAL: calf soft, non tender b/l    I&O's Detail    28 Nov 2024 07:01  -  29 Nov 2024 07:00  --------------------------------------------------------  IN:  Total IN: 0 mL    OUT:    Nasogastric/Oral tube (mL): 350 mL  Total OUT: 350 mL    Total NET: -350 mL    LABS:                        14.4   10.01 )-----------( 296      ( 29 Nov 2024 06:32 )             45.9     11-29    139  |  102  |  12  ----------------------------<  186[H]  3.6   |  25  |  0.75    Ca    9.7      29 Nov 2024 06:32  Phos  5.2     11-29  Mg     2.2     11-29    TPro  9.3[H]  /  Alb  4.3  /  TBili  0.5  /  DBili  x   /  AST  30  /  ALT  38  /  AlkPhos  96  11-28      Urinalysis Basic - ( 29 Nov 2024 06:32 )    Color: x / Appearance: x / SG: x / pH: x  Gluc: 186 mg/dL / Ketone: x  / Bili: x / Urobili: x   Blood: x / Protein: x / Nitrite: x   Leuk Esterase: x / RBC: x / WBC x   Sq Epi: x / Non Sq Epi: x / Bacteria: x        Impression: 64y Female with SBO. +flatus.     Plan:  - Continue NGT to LWS  - Continue NPO, IV hydration  - Encouraged ambulation  - Continue GI prophylaxis  - Continue VTE prophylaxis with lovenox

## 2024-12-01 LAB
ANION GAP SERPL CALC-SCNC: 6 MMOL/L — SIGNIFICANT CHANGE UP (ref 5–17)
APTT BLD: 32.2 SEC — SIGNIFICANT CHANGE UP (ref 24.5–35.6)
BLD GP AB SCN SERPL QL: SIGNIFICANT CHANGE UP
BUN SERPL-MCNC: 13 MG/DL — SIGNIFICANT CHANGE UP (ref 7–23)
CALCIUM SERPL-MCNC: 8.9 MG/DL — SIGNIFICANT CHANGE UP (ref 8.5–10.1)
CHLORIDE SERPL-SCNC: 105 MMOL/L — SIGNIFICANT CHANGE UP (ref 96–108)
CO2 SERPL-SCNC: 27 MMOL/L — SIGNIFICANT CHANGE UP (ref 22–31)
CREAT SERPL-MCNC: 0.6 MG/DL — SIGNIFICANT CHANGE UP (ref 0.5–1.3)
EGFR: 100 ML/MIN/1.73M2 — SIGNIFICANT CHANGE UP
GLUCOSE BLDC GLUCOMTR-MCNC: 146 MG/DL — HIGH (ref 70–99)
GLUCOSE BLDC GLUCOMTR-MCNC: 159 MG/DL — HIGH (ref 70–99)
GLUCOSE BLDC GLUCOMTR-MCNC: 161 MG/DL — HIGH (ref 70–99)
GLUCOSE BLDC GLUCOMTR-MCNC: 186 MG/DL — HIGH (ref 70–99)
GLUCOSE SERPL-MCNC: 197 MG/DL — HIGH (ref 70–99)
HCT VFR BLD CALC: 45.2 % — HIGH (ref 34.5–45)
HGB BLD-MCNC: 14.3 G/DL — SIGNIFICANT CHANGE UP (ref 11.5–15.5)
INR BLD: 1.12 RATIO — SIGNIFICANT CHANGE UP (ref 0.85–1.16)
MAGNESIUM SERPL-MCNC: 2.1 MG/DL — SIGNIFICANT CHANGE UP (ref 1.6–2.6)
MCHC RBC-ENTMCNC: 27 PG — SIGNIFICANT CHANGE UP (ref 27–34)
MCHC RBC-ENTMCNC: 31.6 G/DL — LOW (ref 32–36)
MCV RBC AUTO: 85.4 FL — SIGNIFICANT CHANGE UP (ref 80–100)
NRBC # BLD: 0 /100 WBCS — SIGNIFICANT CHANGE UP (ref 0–0)
PHOSPHATE SERPL-MCNC: 2.2 MG/DL — LOW (ref 2.5–4.5)
PLATELET # BLD AUTO: 244 K/UL — SIGNIFICANT CHANGE UP (ref 150–400)
POTASSIUM SERPL-MCNC: 3.5 MMOL/L — SIGNIFICANT CHANGE UP (ref 3.5–5.3)
POTASSIUM SERPL-SCNC: 3.5 MMOL/L — SIGNIFICANT CHANGE UP (ref 3.5–5.3)
PROTHROM AB SERPL-ACNC: 13 SEC — SIGNIFICANT CHANGE UP (ref 9.9–13.4)
RBC # BLD: 5.29 M/UL — HIGH (ref 3.8–5.2)
RBC # FLD: 14 % — SIGNIFICANT CHANGE UP (ref 10.3–14.5)
SODIUM SERPL-SCNC: 138 MMOL/L — SIGNIFICANT CHANGE UP (ref 135–145)
WBC # BLD: 10.12 K/UL — SIGNIFICANT CHANGE UP (ref 3.8–10.5)
WBC # FLD AUTO: 10.12 K/UL — SIGNIFICANT CHANGE UP (ref 3.8–10.5)

## 2024-12-01 PROCEDURE — 74018 RADEX ABDOMEN 1 VIEW: CPT | Mod: 26

## 2024-12-01 PROCEDURE — 99221 1ST HOSP IP/OBS SF/LOW 40: CPT

## 2024-12-01 PROCEDURE — 99252 IP/OBS CONSLTJ NEW/EST SF 35: CPT

## 2024-12-01 PROCEDURE — 99231 SBSQ HOSP IP/OBS SF/LOW 25: CPT

## 2024-12-01 RX ORDER — POTASSIUM PHOSPHATE, MONOBASIC POTASSIUM PHOSPHATE, DIBASIC INJECTION, 236; 224 MG/ML; MG/ML
15 SOLUTION, CONCENTRATE INTRAVENOUS ONCE
Refills: 0 | Status: COMPLETED | OUTPATIENT
Start: 2024-12-01 | End: 2024-12-01

## 2024-12-01 RX ORDER — ACETAMINOPHEN 500MG 500 MG/1
1000 TABLET, COATED ORAL ONCE
Refills: 0 | Status: COMPLETED | OUTPATIENT
Start: 2024-12-01 | End: 2024-12-01

## 2024-12-01 RX ORDER — POTASSIUM CHLORIDE 600 MG/1
10 TABLET, EXTENDED RELEASE ORAL
Refills: 0 | Status: COMPLETED | OUTPATIENT
Start: 2024-12-01 | End: 2024-12-01

## 2024-12-01 RX ORDER — HYDRALAZINE HYDROCHLORIDE 10 MG/1
10 TABLET ORAL EVERY 8 HOURS
Refills: 0 | Status: DISCONTINUED | OUTPATIENT
Start: 2024-12-01 | End: 2024-12-06

## 2024-12-01 RX ADMIN — POTASSIUM CHLORIDE 100 MILLIEQUIVALENT(S): 600 TABLET, EXTENDED RELEASE ORAL at 11:17

## 2024-12-01 RX ADMIN — ENOXAPARIN SODIUM 40 MILLIGRAM(S): 30 INJECTION SUBCUTANEOUS at 06:16

## 2024-12-01 RX ADMIN — POTASSIUM PHOSPHATE, MONOBASIC POTASSIUM PHOSPHATE, DIBASIC INJECTION, 62.5 MILLIMOLE(S): 236; 224 SOLUTION, CONCENTRATE INTRAVENOUS at 15:22

## 2024-12-01 RX ADMIN — ACETAMINOPHEN 500MG 1000 MILLIGRAM(S): 500 TABLET, COATED ORAL at 01:23

## 2024-12-01 RX ADMIN — ACETAMINOPHEN 500MG 400 MILLIGRAM(S): 500 TABLET, COATED ORAL at 21:51

## 2024-12-01 RX ADMIN — POTASSIUM CHLORIDE 100 MILLIEQUIVALENT(S): 600 TABLET, EXTENDED RELEASE ORAL at 12:46

## 2024-12-01 RX ADMIN — Medication 1 SPRAY(S): at 11:32

## 2024-12-01 RX ADMIN — Medication 1: at 06:17

## 2024-12-01 RX ADMIN — ACETAMINOPHEN 500MG 1000 MILLIGRAM(S): 500 TABLET, COATED ORAL at 14:23

## 2024-12-01 RX ADMIN — Medication 1 SPRAY(S): at 06:34

## 2024-12-01 RX ADMIN — ENOXAPARIN SODIUM 40 MILLIGRAM(S): 30 INJECTION SUBCUTANEOUS at 17:35

## 2024-12-01 RX ADMIN — ACETAMINOPHEN 500MG 400 MILLIGRAM(S): 500 TABLET, COATED ORAL at 00:53

## 2024-12-01 RX ADMIN — Medication 1: at 11:30

## 2024-12-01 RX ADMIN — POTASSIUM CHLORIDE 100 MILLIEQUIVALENT(S): 600 TABLET, EXTENDED RELEASE ORAL at 13:56

## 2024-12-01 RX ADMIN — Medication 1 SPRAY(S): at 00:41

## 2024-12-01 RX ADMIN — Medication 1 SPRAY(S): at 17:35

## 2024-12-01 RX ADMIN — PANTOPRAZOLE SODIUM 40 MILLIGRAM(S): 40 TABLET, DELAYED RELEASE ORAL at 11:29

## 2024-12-01 RX ADMIN — Medication 140 MILLILITER(S): at 17:35

## 2024-12-01 RX ADMIN — ACETAMINOPHEN 500MG 400 MILLIGRAM(S): 500 TABLET, COATED ORAL at 12:45

## 2024-12-01 NOTE — CONSULT NOTE ADULT - ASSESSMENT
65 Y/O female with a PMHx of DM, HLD and a PSHx of  x2, hysterectomy presents to the ED for abdominal pain. Found to have SBO. Medical consulted for HTN and medical optimization.    #SBO  - Patient is medically optimized for surgery  - EKG w/    #HTN  - Given NPO, will start IV hydral 10 Q8H 65 Y/O female with a PMHx of DM, HLD and a PSHx of  x2, hysterectomy presents to the ED for abdominal pain. Found to have SBO. Medical consulted for HTN and medical optimization.    #SBO  - Patient is medically optimized for surgery  - EKG w/ NSR at 81 bpm.  - Denies chest pain or SOB. No difficulty ambulating 1 flight of stairs  - RCRI score is 1 and associated with 6% of major cardiac event. METS level of activity and has no active cardiac condition. Patient may proceed with surgery with no additional cardiac testing.     #HTN  - Given NPO, will start IV hydral 10 Q8H

## 2024-12-01 NOTE — PROGRESS NOTE ADULT - SUBJECTIVE AND OBJECTIVE BOX
SURGERY PROGRESS HPI:  Pt seen and examined at bedside. Had periumbilical pain overnight. Pt tolerating NGT. Pt denies nausea and vomiting. Passing flatus. Voiding well. Pt denies chest pain, SOB, dizziness, fever, chills.     Vital Signs Last 24 Hrs  T(C): 36.7 (01 Dec 2024 04:48), Max: 37.1 (30 Nov 2024 23:34)  T(F): 98.1 (01 Dec 2024 04:48), Max: 98.8 (30 Nov 2024 23:34)  HR: 76 (01 Dec 2024 04:48) (76 - 86)  BP: 157/96 (01 Dec 2024 04:48) (147/84 - 169/91)  BP(mean): --  RR: 18 (01 Dec 2024 04:48) (17 - 18)  SpO2: 96% (01 Dec 2024 04:48) (94% - 96%)    Parameters below as of 30 Nov 2024 11:50  Patient On (Oxygen Delivery Method): room air      PHYSICAL EXAM:    CONSTITUTIONAL: NAD  HEENT: Atraumatic, Normocephalic  RESPIRATORY: Clear to ausculation, bilaterally   CARDIOVASCULAR: RRR S1S2  GASTROINTESTINAL: non distended, +BS, soft, non tender, no guarding  MUSCULOSKELETAL: calf soft, non tender b/l    I&O's Detail    29 Nov 2024 07:01  -  30 Nov 2024 07:00  --------------------------------------------------------  IN:  Total IN: 0 mL    OUT:    Nasogastric/Oral tube (mL): 600 mL  Total OUT: 600 mL    Total NET: -600 mL      30 Nov 2024 07:01  -  01 Dec 2024 05:29  --------------------------------------------------------  IN:  Total IN: 0 mL    OUT:    Nasogastric/Oral tube (mL): 1600 mL  Total OUT: 1600 mL    Total NET: -1600 mL      LABS:                        14.1   9.93  )-----------( 267      ( 30 Nov 2024 05:40 )             44.4     11-30    138  |  103  |  16  ----------------------------<  183[H]  3.4[L]   |  26  |  0.70    Ca    9.1      30 Nov 2024 05:40  Phos  2.8     11-30  Mg     1.9     11-30    Urinalysis Basic - ( 30 Nov 2024 05:40 )    Color: x / Appearance: x / SG: x / pH: x  Gluc: 183 mg/dL / Ketone: x  / Bili: x / Urobili: x   Blood: x / Protein: x / Nitrite: x   Leuk Esterase: x / RBC: x / WBC x   Sq Epi: x / Non Sq Epi: x / Bacteria: x      Impression: 64y Female with SBO. +flatus. Gastrografin challenge 11/30.    Plan:  - Continue NGT to LWS  - Continue NPO, IV hydration  - Encouraged ambulation  - Continue GI prophylaxis  - Continue VTE prophylaxis with lovenox  - Follow up [facility] SURGERY PROGRESS HPI:  Pt seen and examined at bedside. Had periumbilical pain overnight, now improved. Pt tolerating NGT. Pt denies nausea and vomiting. Passed flatus overnight. Voiding well. Pt denies chest pain, SOB, dizziness, fever, chills.     Vital Signs Last 24 Hrs  T(C): 36.7 (01 Dec 2024 04:48), Max: 37.1 (30 Nov 2024 23:34)  T(F): 98.1 (01 Dec 2024 04:48), Max: 98.8 (30 Nov 2024 23:34)  HR: 76 (01 Dec 2024 04:48) (76 - 86)  BP: 157/96 (01 Dec 2024 04:48) (147/84 - 169/91)  BP(mean): --  RR: 18 (01 Dec 2024 04:48) (17 - 18)  SpO2: 96% (01 Dec 2024 04:48) (94% - 96%)    Parameters below as of 30 Nov 2024 11:50  Patient On (Oxygen Delivery Method): room air      PHYSICAL EXAM:    CONSTITUTIONAL: NAD  HEENT: Atraumatic, Normocephalic  RESPIRATORY: Clear to ausculation, bilaterally   CARDIOVASCULAR: RRR S1S2  GASTROINTESTINAL: non distended, +BS, soft, non tender, no guarding  MUSCULOSKELETAL: calf soft, non tender b/l    I&O's Detail    29 Nov 2024 07:01  -  30 Nov 2024 07:00  --------------------------------------------------------  IN:  Total IN: 0 mL    OUT:    Nasogastric/Oral tube (mL): 600 mL  Total OUT: 600 mL    Total NET: -600 mL      30 Nov 2024 07:01  -  01 Dec 2024 05:29  --------------------------------------------------------  IN:  Total IN: 0 mL    OUT:    Nasogastric/Oral tube (mL): 1600 mL  Total OUT: 1600 mL    Total NET: -1600 mL      LABS:                        14.1   9.93  )-----------( 267      ( 30 Nov 2024 05:40 )             44.4     11-30    138  |  103  |  16  ----------------------------<  183[H]  3.4[L]   |  26  |  0.70    Ca    9.1      30 Nov 2024 05:40  Phos  2.8     11-30  Mg     1.9     11-30    Urinalysis Basic - ( 30 Nov 2024 05:40 )    Color: x / Appearance: x / SG: x / pH: x  Gluc: 183 mg/dL / Ketone: x  / Bili: x / Urobili: x   Blood: x / Protein: x / Nitrite: x   Leuk Esterase: x / RBC: x / WBC x   Sq Epi: x / Non Sq Epi: x / Bacteria: x      Impression: 64y Female with SBO. +flatus. Gastrografin challenge 11/30.    Plan:  - Continue NGT to LWS  - Continue NPO, IV hydration  - Encouraged ambulation  - Continue GI prophylaxis  - Continue VTE prophylaxis with lovenox  - Follow up [facility]

## 2024-12-01 NOTE — CONSULT NOTE ADULT - SUBJECTIVE AND OBJECTIVE BOX
PMD :  Cardio :     Patient is a 64y old  Female who presents with a chief complaint of small bowel obstruction (01 Dec 2024 05:28)      HPI:  63 Y/O female with a PMHx of DM, HLD and a PSHx of  x2, hysterectomy presents to the ED for abdominal pain. States she was experiencing 2 days of constipation before eating some prunes and drinking coffee. She had a BM around 9AM and about an hour later started experiencing severe abdominal pain with associated nausea and vomiting about 4 times before she came to the ED. States she recently had a colonoscopy in 2024 to which a precancerous polyp was discovered at the ileocecal junction. She is currently scheduled for an advanced gastroenterology appointment on 2024 for removal. Denies fevers, chills, chest pain, SOB, hematuria, hematochezia.  (2024 00:01)      PAST MEDICAL & SURGICAL HISTORY:  GERD (gastroesophageal reflux disease)      DM (diabetes mellitus)      HTN (hypertension)      HLD (hyperlipidemia)      H/O:       History of hysterectomy      H/O colonoscopy          Social History:  Tabacco -   ETOH -   Illicit drug abuse - denies    FAMILY HISTORY:  No pertinent family history in first degree relatives        Allergies    metformin (Urticaria)    Intolerances        HOME MEDICATIONS :     REVIEW OF SYSTEMS:    CONSTITUTIONAL: No fever, some fatigue  EYES: No eye pain  NECK: No pain or stiffness  RESPIRATORY: No cough, No shortness of breath  CARDIOVASCULAR: No chest pain, palpitations, dizziness, or leg swelling  GASTROINTESTINAL: No abdominal or epigastric pain. No nausea, vomiting, or hematemesis; No diarrhea or constipation. No melena or hematochezia.  GENITOURINARY: No dysuria, frequency, hematuria, or incontinence  NEUROLOGICAL: No headaches, memory loss, loss of strength, numbness, or tremors  SKIN: No itching, burning, rashes, or lesions   LYMPH NODES: No enlarged glands  ENDOCRINE: No heat or cold intolerance; No hair loss  MUSCULOSKELETAL:   PSYCHIATRIC: No depression, anxiety, mood swings, or difficulty sleeping  HEME/LYMPH: No easy bruising, or bleeding gums  ALLERGY AND IMMUNOLOGIC: No hives or eczema    MEDICATIONS  (STANDING):  benzocaine 20% Spray 1 Spray(s) Topical four times a day  dextrose 5% + sodium chloride 0.45% with potassium chloride 20 mEq/L 1000 milliLiter(s) (140 mL/Hr) IV Continuous <Continuous>  dextrose 5%. 1000 milliLiter(s) (100 mL/Hr) IV Continuous <Continuous>  dextrose 5%. 1000 milliLiter(s) (50 mL/Hr) IV Continuous <Continuous>  dextrose 50% Injectable 25 Gram(s) IV Push once  dextrose 50% Injectable 12.5 Gram(s) IV Push once  dextrose 50% Injectable 25 Gram(s) IV Push once  enoxaparin Injectable 40 milliGRAM(s) SubCutaneous every 12 hours  glucagon  Injectable 1 milliGRAM(s) IntraMuscular once  influenza  Vaccine (HIGH DOSE) 0.5 milliLiter(s) IntraMuscular once  insulin lispro (ADMELOG) corrective regimen sliding scale   SubCutaneous three times a day before meals  insulin lispro (ADMELOG) corrective regimen sliding scale   SubCutaneous at bedtime  pantoprazole  Injectable 40 milliGRAM(s) IV Push daily  potassium phosphate IVPB 15 milliMole(s) IV Intermittent once    MEDICATIONS  (PRN):  dextrose Oral Gel 15 Gram(s) Oral once PRN Blood Glucose LESS THAN 70 milliGRAM(s)/deciliter      Vital Signs Last 24 Hrs  T(C): 36.5 (01 Dec 2024 12:32), Max: 37.1 (2024 23:34)  T(F): 97.7 (01 Dec 2024 12:32), Max: 98.8 (2024 23:34)  HR: 79 (01 Dec 2024 12:32) (76 - 80)  BP: 164/93 (01 Dec 2024 12:32) (157/96 - 169/91)  BP(mean): --  RR: 18 (01 Dec 2024 12:32) (17 - 18)  SpO2: 97% (01 Dec 2024 12:32) (95% - 97%)    Parameters below as of 01 Dec 2024 12:32  Patient On (Oxygen Delivery Method): room air        PHYSICAL EXAM:    GENERAL: NAD, well-groomed, well-developed  HEAD:  Atraumatic, Normocephalic  EYES: EOMI, PERRLA, conjunctiva and sclera clear  NECK: Supple, No JVD, Normal thyroid  NERVOUS SYSTEM:  Alert & Oriented X3, Good concentration; Motor Strength 5/5 B/L upper and lower extremities; DTRs 2+ intact and symmetric  CHEST/LUNG: CTA  b/l,  no rales, rhonchi, wheezing, or rubs  HEART: Regular rate and rhythm; No murmurs, rubs, or gallops  ABDOMEN: Soft, Nontender, Nondistended; Bowel sounds present  EXTREMITIES:  2+ Peripheral Pulses, No clubbing, cyanosis, or edema ,   LYMPH: No lymphadenopathy noted  SKIN: No rashes or lesions    LABS:                        14.3   10.12 )-----------( 244      ( 01 Dec 2024 07:58 )             45.2         138  |  105  |  13  ----------------------------<  197[H]  3.5   |  27  |  0.60    Ca    8.9      01 Dec 2024 07:58  Phos  2.2       Mg     2.1           PT/INR - ( 01 Dec 2024 07:58 )   PT: 13.0 sec;   INR: 1.12 ratio         PTT - ( 01 Dec 2024 07:58 )  PTT:32.2 sec  Urinalysis Basic - ( 01 Dec 2024 07:58 )    Color: x / Appearance: x / SG: x / pH: x  Gluc: 197 mg/dL / Ketone: x  / Bili: x / Urobili: x   Blood: x / Protein: x / Nitrite: x   Leuk Esterase: x / RBC: x / WBC x   Sq Epi: x / Non Sq Epi: x / Bacteria: x          RADIOLOGY & ADDITIONAL STUDIES:

## 2024-12-02 LAB
ANION GAP SERPL CALC-SCNC: 8 MMOL/L — SIGNIFICANT CHANGE UP (ref 5–17)
APTT BLD: 29.8 SEC — SIGNIFICANT CHANGE UP (ref 24.5–35.6)
BLD GP AB SCN SERPL QL: SIGNIFICANT CHANGE UP
BUN SERPL-MCNC: 13 MG/DL — SIGNIFICANT CHANGE UP (ref 7–23)
CALCIUM SERPL-MCNC: 9 MG/DL — SIGNIFICANT CHANGE UP (ref 8.5–10.1)
CHLORIDE SERPL-SCNC: 105 MMOL/L — SIGNIFICANT CHANGE UP (ref 96–108)
CO2 SERPL-SCNC: 28 MMOL/L — SIGNIFICANT CHANGE UP (ref 22–31)
CREAT SERPL-MCNC: 0.65 MG/DL — SIGNIFICANT CHANGE UP (ref 0.5–1.3)
EGFR: 98 ML/MIN/1.73M2 — SIGNIFICANT CHANGE UP
GLUCOSE BLDC GLUCOMTR-MCNC: 149 MG/DL — HIGH (ref 70–99)
GLUCOSE BLDC GLUCOMTR-MCNC: 150 MG/DL — HIGH (ref 70–99)
GLUCOSE BLDC GLUCOMTR-MCNC: 157 MG/DL — HIGH (ref 70–99)
GLUCOSE BLDC GLUCOMTR-MCNC: 170 MG/DL — HIGH (ref 70–99)
GLUCOSE BLDC GLUCOMTR-MCNC: 180 MG/DL — HIGH (ref 70–99)
GLUCOSE SERPL-MCNC: 171 MG/DL — HIGH (ref 70–99)
HCT VFR BLD CALC: 45.4 % — HIGH (ref 34.5–45)
HGB BLD-MCNC: 14.2 G/DL — SIGNIFICANT CHANGE UP (ref 11.5–15.5)
INR BLD: 1.15 RATIO — SIGNIFICANT CHANGE UP (ref 0.85–1.16)
MAGNESIUM SERPL-MCNC: 1.9 MG/DL — SIGNIFICANT CHANGE UP (ref 1.6–2.6)
MCHC RBC-ENTMCNC: 26.8 PG — LOW (ref 27–34)
MCHC RBC-ENTMCNC: 31.3 G/DL — LOW (ref 32–36)
MCV RBC AUTO: 85.8 FL — SIGNIFICANT CHANGE UP (ref 80–100)
NRBC # BLD: 0 /100 WBCS — SIGNIFICANT CHANGE UP (ref 0–0)
PHOSPHATE SERPL-MCNC: 2.6 MG/DL — SIGNIFICANT CHANGE UP (ref 2.5–4.5)
PLATELET # BLD AUTO: 245 K/UL — SIGNIFICANT CHANGE UP (ref 150–400)
POTASSIUM SERPL-MCNC: 3.8 MMOL/L — SIGNIFICANT CHANGE UP (ref 3.5–5.3)
POTASSIUM SERPL-SCNC: 3.8 MMOL/L — SIGNIFICANT CHANGE UP (ref 3.5–5.3)
PROTHROM AB SERPL-ACNC: 13 SEC — SIGNIFICANT CHANGE UP (ref 9.9–13.4)
RBC # BLD: 5.29 M/UL — HIGH (ref 3.8–5.2)
RBC # FLD: 14 % — SIGNIFICANT CHANGE UP (ref 10.3–14.5)
SODIUM SERPL-SCNC: 141 MMOL/L — SIGNIFICANT CHANGE UP (ref 135–145)
WBC # BLD: 9.34 K/UL — SIGNIFICANT CHANGE UP (ref 3.8–10.5)
WBC # FLD AUTO: 9.34 K/UL — SIGNIFICANT CHANGE UP (ref 3.8–10.5)

## 2024-12-02 PROCEDURE — 99232 SBSQ HOSP IP/OBS MODERATE 35: CPT

## 2024-12-02 PROCEDURE — 74019 RADEX ABDOMEN 2 VIEWS: CPT | Mod: 26

## 2024-12-02 PROCEDURE — 99233 SBSQ HOSP IP/OBS HIGH 50: CPT | Mod: 57

## 2024-12-02 RX ORDER — POTASSIUM PHOSPHATE, MONOBASIC POTASSIUM PHOSPHATE, DIBASIC INJECTION, 236; 224 MG/ML; MG/ML
15 SOLUTION, CONCENTRATE INTRAVENOUS ONCE
Refills: 0 | Status: COMPLETED | OUTPATIENT
Start: 2024-12-02 | End: 2024-12-02

## 2024-12-02 RX ORDER — 0.9 % SODIUM CHLORIDE 0.9 %
1000 INTRAVENOUS SOLUTION INTRAVENOUS ONCE
Refills: 0 | Status: COMPLETED | OUTPATIENT
Start: 2024-12-02 | End: 2024-12-02

## 2024-12-02 RX ORDER — ACETAMINOPHEN 500MG 500 MG/1
1000 TABLET, COATED ORAL ONCE
Refills: 0 | Status: COMPLETED | OUTPATIENT
Start: 2024-12-02 | End: 2024-12-02

## 2024-12-02 RX ADMIN — Medication 1 SPRAY(S): at 11:20

## 2024-12-02 RX ADMIN — ENOXAPARIN SODIUM 40 MILLIGRAM(S): 30 INJECTION SUBCUTANEOUS at 17:08

## 2024-12-02 RX ADMIN — HYDRALAZINE HYDROCHLORIDE 10 MILLIGRAM(S): 10 TABLET ORAL at 06:32

## 2024-12-02 RX ADMIN — ACETAMINOPHEN 500MG 400 MILLIGRAM(S): 500 TABLET, COATED ORAL at 15:22

## 2024-12-02 RX ADMIN — PANTOPRAZOLE SODIUM 40 MILLIGRAM(S): 40 TABLET, DELAYED RELEASE ORAL at 11:09

## 2024-12-02 RX ADMIN — Medication 1: at 00:28

## 2024-12-02 RX ADMIN — POTASSIUM PHOSPHATE, MONOBASIC POTASSIUM PHOSPHATE, DIBASIC INJECTION, 62.5 MILLIMOLE(S): 236; 224 SOLUTION, CONCENTRATE INTRAVENOUS at 11:08

## 2024-12-02 RX ADMIN — Medication 100 GRAM(S): at 10:09

## 2024-12-02 RX ADMIN — Medication 1000 MILLILITER(S): at 20:36

## 2024-12-02 RX ADMIN — ACETAMINOPHEN 500MG 1000 MILLIGRAM(S): 500 TABLET, COATED ORAL at 16:30

## 2024-12-02 RX ADMIN — Medication 1 SPRAY(S): at 23:49

## 2024-12-02 RX ADMIN — Medication 1 SPRAY(S): at 17:07

## 2024-12-02 RX ADMIN — Medication 1: at 06:32

## 2024-12-02 RX ADMIN — Medication 1 SPRAY(S): at 06:35

## 2024-12-02 RX ADMIN — Medication 1 SPRAY(S): at 00:29

## 2024-12-02 RX ADMIN — ENOXAPARIN SODIUM 40 MILLIGRAM(S): 30 INJECTION SUBCUTANEOUS at 06:49

## 2024-12-02 RX ADMIN — Medication 140 MILLILITER(S): at 15:23

## 2024-12-02 NOTE — PROGRESS NOTE ADULT - SUBJECTIVE AND OBJECTIVE BOX
SURGERY PROGRESS HPI:  Pt seen and examined at bedside. Had periumbilical pain overnight, now improved. Pt tolerating NGT. Pt denies nausea and vomiting. Passing small amounts of flatus. Voiding well. Pt denies chest pain, SOB, dizziness, fever, chills.     Vital Signs Last 24 Hrs  T(C): 36.4 (02 Dec 2024 05:35), Max: 36.8 (02 Dec 2024 00:14)  T(F): 97.6 (02 Dec 2024 05:35), Max: 98.3 (02 Dec 2024 00:14)  HR: 76 (02 Dec 2024 05:35) (69 - 79)  BP: 163/81 (02 Dec 2024 05:35) (127/81 - 164/93)  BP(mean): --  RR: 18 (02 Dec 2024 05:35) (17 - 18)  SpO2: 96% (02 Dec 2024 05:35) (96% - 97%)    Parameters below as of 02 Dec 2024 05:35  Patient On (Oxygen Delivery Method): room air    PHYSICAL EXAM:    CONSTITUTIONAL: NAD  HEENT: Atraumatic, Normocephalic  RESPIRATORY: Clear to ausculation, bilaterally   CARDIOVASCULAR: RRR S1S2  GASTROINTESTINAL: softly distended, non tender, no guarding. NGT with gastric output.   MUSCULOSKELETAL: calf soft, non tender b/l    I&O's Detail    30 Nov 2024 07:01  -  01 Dec 2024 07:00  --------------------------------------------------------  IN:  Total IN: 0 mL    OUT:    Nasogastric/Oral tube (mL): 1600 mL  Total OUT: 1600 mL    Total NET: -1600 mL      01 Dec 2024 07:01  -  02 Dec 2024 05:59  --------------------------------------------------------  IN:  Total IN: 0 mL    OUT:    Nasogastric/Oral tube (mL): 900 mL  Total OUT: 900 mL    Total NET: -900 mL    LABS:                        14.3   10.12 )-----------( 244      ( 01 Dec 2024 07:58 )             45.2     12-01    138  |  105  |  13  ----------------------------<  197[H]  3.5   |  27  |  0.60    Ca    8.9      01 Dec 2024 07:58  Phos  2.2     12-01  Mg     2.1     12-01      PT/INR - ( 01 Dec 2024 07:58 )   PT: 13.0 sec;   INR: 1.12 ratio       PTT - ( 01 Dec 2024 07:58 )  PTT:32.2 sec    Urinalysis Basic - ( 01 Dec 2024 07:58 )    Color: x / Appearance: x / SG: x / pH: x  Gluc: 197 mg/dL / Ketone: x  / Bili: x / Urobili: x   Blood: x / Protein: x / Nitrite: x   Leuk Esterase: x / RBC: x / WBC x   Sq Epi: x / Non Sq Epi: x / Bacteria: x      Impression: 64y Female with SBO. +flatus. Failed gastrografin challenge 11/30.    Plan:  - Continue NGT to LWS  - Continue NPO, IV hydration  - Encouraged ambulation  - Continue GI prophylaxis  - Continue VTE prophylaxis with lovenox  - Possible OR today vs. tomorrow   - Cleared by medicine for surgery

## 2024-12-02 NOTE — CHART NOTE - NSCHARTNOTEFT_GEN_A_CORE
Patient still in pain and condition not improving. No bowel movements, minimal flatus.  KUB from this AM reviewed with Dr. Vickey Elizabeth showing air fluid levels in small bowel. Colon not visualized.  Case discussed with Dr. Ferreira plans for OR tomorrow.  Booked and consented for diagnostic laparoscopy; possible exploratory laparotomy; possible bowel resection. Patient still in pain and condition not improving. No bowel movements, minimal flatus.  KUB from this AM reviewed with Dr. Vickey Elizabeth showing air fluid levels in small bowel. Colon not visualized.  Case discussed with Dr. Ferreira plans for OR tomorrow, 12/2/24.  Booked and consented for diagnostic laparoscopy; possible exploratory laparotomy; possible bowel resection.

## 2024-12-02 NOTE — PROGRESS NOTE ADULT - SUBJECTIVE AND OBJECTIVE BOX
Patient is a 64y old  Female who presents with a chief complaint of small bowel obstruction (02 Dec 2024 05:59)    INTERVAL HPI/OVERNIGHT EVENTS: No acute events overnight. HD stable.     MEDICATIONS  (STANDING):  benzocaine 20% Spray 1 Spray(s) Topical four times a day  dextrose 5% + sodium chloride 0.45% with potassium chloride 20 mEq/L 1000 milliLiter(s) (140 mL/Hr) IV Continuous <Continuous>  dextrose 5%. 1000 milliLiter(s) (100 mL/Hr) IV Continuous <Continuous>  dextrose 5%. 1000 milliLiter(s) (50 mL/Hr) IV Continuous <Continuous>  dextrose 50% Injectable 25 Gram(s) IV Push once  dextrose 50% Injectable 12.5 Gram(s) IV Push once  dextrose 50% Injectable 25 Gram(s) IV Push once  enoxaparin Injectable 40 milliGRAM(s) SubCutaneous every 12 hours  glucagon  Injectable 1 milliGRAM(s) IntraMuscular once  influenza  Vaccine (HIGH DOSE) 0.5 milliLiter(s) IntraMuscular once  insulin lispro (ADMELOG) corrective regimen sliding scale   SubCutaneous every 6 hours  pantoprazole  Injectable 40 milliGRAM(s) IV Push daily    MEDICATIONS  (PRN):  dextrose Oral Gel 15 Gram(s) Oral once PRN Blood Glucose LESS THAN 70 milliGRAM(s)/deciliter  hydrALAZINE Injectable 10 milliGRAM(s) IV Push every 8 hours PRN SBP> 130      Allergies    metformin (Urticaria)    Intolerances        REVIEW OF SYSTEMS: all negative with exception of above    Vital Signs Last 24 Hrs  T(C): 36.4 (02 Dec 2024 11:05), Max: 36.8 (02 Dec 2024 00:14)  T(F): 97.5 (02 Dec 2024 11:05), Max: 98.3 (02 Dec 2024 00:14)  HR: 75 (02 Dec 2024 11:05) (69 - 76)  BP: 168/94 (02 Dec 2024 11:05) (127/81 - 168/94)  BP(mean): --  RR: 17 (02 Dec 2024 11:05) (17 - 18)  SpO2: 97% (02 Dec 2024 11:05) (96% - 97%)    Parameters below as of 02 Dec 2024 11:05  Patient On (Oxygen Delivery Method): room air    PHYSICAL EXAM:  GENERAL: NAD, well-groomed  NERVOUS SYSTEM:  Alert & Oriented X3, Good concentration; Motor Strength 5/5 B/L upper and lower extremities; DTRs 2+ intact and symmetric  CHEST/LUNG: Clear to percussion bilaterally; No rales, rhonchi, wheezing, or rubs  HEART: Regular rate and rhythm; No murmurs, rubs, or gallops  ABDOMEN: Soft, Nontender, Nondistended; Bowel sounds present  EXTREMITIES:  2+ Peripheral Pulses, No clubbing, cyanosis, or edema    LABS:                        14.2   9.34  )-----------( 245      ( 02 Dec 2024 06:45 )             45.4     12-02    141  |  105  |  13  ----------------------------<  171[H]  3.8   |  28  |  0.65    Ca    9.0      02 Dec 2024 06:45  Phos  2.6     12-02  Mg     1.9     12-02      PT/INR - ( 02 Dec 2024 06:45 )   PT: 13.0 sec;   INR: 1.15 ratio         PTT - ( 02 Dec 2024 06:45 )  PTT:29.8 sec  Urinalysis Basic - ( 02 Dec 2024 06:45 )    Color: x / Appearance: x / SG: x / pH: x  Gluc: 171 mg/dL / Ketone: x  / Bili: x / Urobili: x   Blood: x / Protein: x / Nitrite: x   Leuk Esterase: x / RBC: x / WBC x   Sq Epi: x / Non Sq Epi: x / Bacteria: x      CAPILLARY BLOOD GLUCOSE      POCT Blood Glucose.: 150 mg/dL (02 Dec 2024 11:59)  POCT Blood Glucose.: 170 mg/dL (02 Dec 2024 05:43)  POCT Blood Glucose.: 157 mg/dL (02 Dec 2024 00:04)  POCT Blood Glucose.: 146 mg/dL (01 Dec 2024 16:08)      RADIOLOGY & ADDITIONAL TESTS:    Imaging Personally Reviewed:  [ ] YES  [ ] NO    Consultant(s) Notes Reviewed:  [ ] YES  [ ] NO    Care Discussed with Consultants/Other Providers [ ] YES  [ ] NO

## 2024-12-02 NOTE — MEDICAL STUDENT PROGRESS NOTE(EDUCATION) - SUBJECTIVE AND OBJECTIVE BOX
Marci Santamaria is a 65 yo woman PMH DM, HLD, GERD, HTN and PSH  x2, hysterectomy admitted with SBO currently receiving conservative management.    Overnight events: No acute events overnight.    Subjective: Patient seen and examined at bedside, sitting comfortably in chair. Pt endorses diffuse abdominal pain that is now better controlled with medication compared to this morning. She has been passing minimal flatus, has not yet had a BM, and is urinating as normal. She is able to ambulate to the bathroom but ambulation remains limited due to pain. She does not endorse any fevers, chills, dizziness, chest pain, SOB, or N/V.     Objective:  Vital Signs Last 24 Hrs  T(C): 36.6 (02 Dec 2024 16:54), Max: 36.8 (02 Dec 2024 00:14)  T(F): 97.9 (02 Dec 2024 16:54), Max: 98.3 (02 Dec 2024 00:14)  HR: 91 (02 Dec 2024 16:54) (69 - 91)  BP: 133/79 (02 Dec 2024 16:54) (133/79 - 168/94)  BP(mean): --  RR: 18 (02 Dec 2024 16:54) (17 - 18)  SpO2: 97% (02 Dec 2024 16:54) (96% - 97%)    Parameters below as of 02 Dec 2024 11:05  Patient On (Oxygen Delivery Method): room air    Physical Exam:  General: NAD, sitting in chair comfortably  Respiratory: Clear to auscultation bilaterally; unlabored respirations  Cardiovascular: +S1 and +S2, regular rate and rhythm; no murmurs, rubs, or gallops  Abdomen: soft, mildly distended, diffusely tender, NGT with brown gastric output   Extremities: No calf pain or tenderness, no edema       LABS:                          14.2   9.34  )-----------( 245      ( 02 Dec 2024 06:45 )             45.4     12-    141  |  105  |  13  ----------------------------<  171[H]  3.8   |  28  |  0.65    Ca    9.0      02 Dec 2024 06:45  Phos  2.6     12-02  Mg     1.9     12-02        PT/INR - ( 02 Dec 2024 06:45 )   PT: 13.0 sec;   INR: 1.15 ratio         PTT - ( 02 Dec 2024 06:45 )  PTT:29.8 sec  Urinalysis Basic - ( 02 Dec 2024 06:45 )    Color: x / Appearance: x / SG: x / pH: x  Gluc: 171 mg/dL / Ketone: x  / Bili: x / Urobili: x   Blood: x / Protein: x / Nitrite: x   Leuk Esterase: x / RBC: x / WBC x   Sq Epi: x / Non Sq Epi: x / Bacteria: x    I&O's Summary    01 Dec 2024 07:01  -  02 Dec 2024 07:00  --------------------------------------------------------  IN: 0 mL / OUT: 900 mL / NET: -900 mL    02 Dec 2024 07:01  -  02 Dec 2024 18:27  --------------------------------------------------------  IN: 0 mL / OUT: 1000 mL / NET: -1000 mL      Radiology/ Imaging:  CTAP with IV Contrast :  IMPRESSION:  Small bowel obstruction with transition point in the pelvis.    Xray KUB :  IMPRESSION: SBO    Xray KUB :  IMPRESSION: Persistent small bowel obstruction not excluded         Marci Santamaria is a 63 yo woman PMH DM, HLD, GERD, HTN and PSH  x2, hysterectomy admitted with SBO currently receiving conservative management.    Overnight events: No acute events overnight.    Subjective: Patient seen and examined at bedside, sitting comfortably in chair. Pt endorses diffuse abdominal pain that is now better controlled with medication compared to this morning. She has been passing minimal flatus, has not yet had a BM, and is urinating as normal. She is able to ambulate to the bathroom but ambulation remains limited due to pain. She does not endorse any fevers, chills, dizziness, chest pain, SOB, or N/V.     Objective:  Vital Signs Last 24 Hrs  T(C): 36.6 (02 Dec 2024 16:54), Max: 36.8 (02 Dec 2024 00:14)  T(F): 97.9 (02 Dec 2024 16:54), Max: 98.3 (02 Dec 2024 00:14)  HR: 91 (02 Dec 2024 16:54) (69 - 91)  BP: 133/79 (02 Dec 2024 16:54) (133/79 - 168/94)  BP(mean): --  RR: 18 (02 Dec 2024 16:54) (17 - 18)  SpO2: 97% (02 Dec 2024 16:54) (96% - 97%)    Parameters below as of 02 Dec 2024 11:05  Patient On (Oxygen Delivery Method): room air    Physical Exam:  General: NAD, sitting in chair comfortably  Respiratory: Clear to auscultation bilaterally; unlabored respirations  Cardiovascular: +S1 and +S2, regular rate and rhythm; no murmurs, rubs, or gallops  Abdomen: soft, mildly distended, diffusely tender, NGT with brown gastric output   Extremities: No calf pain or tenderness, no edema       LABS:                          14.2   9.34  )-----------( 245      ( 02 Dec 2024 06:45 )             45.4     12-    141  |  105  |  13  ----------------------------<  171[H]  3.8   |  28  |  0.65    Ca    9.0      02 Dec 2024 06:45  Phos  2.6     12-02  Mg     1.9     12-02        PT/INR - ( 02 Dec 2024 06:45 )   PT: 13.0 sec;   INR: 1.15 ratio         PTT - ( 02 Dec 2024 06:45 )  PTT:29.8 sec  Urinalysis Basic - ( 02 Dec 2024 06:45 )    Color: x / Appearance: x / SG: x / pH: x  Gluc: 171 mg/dL / Ketone: x  / Bili: x / Urobili: x   Blood: x / Protein: x / Nitrite: x   Leuk Esterase: x / RBC: x / WBC x   Sq Epi: x / Non Sq Epi: x / Bacteria: x    I&O's Summary    01 Dec 2024 07:01  -  02 Dec 2024 07:00  --------------------------------------------------------  IN: 0 mL / OUT: 900 mL / NET: -900 mL    02 Dec 2024 07:01  -  02 Dec 2024 18:27  --------------------------------------------------------  IN: 0 mL / OUT: 1000 mL / NET: -1000 mL      Radiology/ Imaging:  CTAP with IV Contrast :  IMPRESSION:  Small bowel obstruction with transition point in the pelvis.    Xray KUB :  IMPRESSION: SBO    Xray KUB :  IMPRESSION: Persistent small bowel obstruction not excluded

## 2024-12-02 NOTE — MEDICAL STUDENT PROGRESS NOTE(EDUCATION) - ASSESSMENT
Marci Santamaria is a 63 yo woman PMH DM, HLD, GERD, HTN and PSH  x2, hysterectomy admitted with SBO currently receiving conservative management. Pt endorses diffuse abdominal pain that is now better controlled with medication compared to this morning. She has been passing minimal flatus, has not yet had a BM, is urinating as normal with ambulation limited due to pain. Vital signs are stable and WNL. Labs are unremarkable. NGT 1 L gastric output from 7 am. Most recent KUB Xray shows persistent SBO not excluded, with air fluid levels in small bowel and colon not visualized. Plan to proceed with diagnostic laparoscopy; possible exploratory laparotomy; possible bowel resection tomorrow 12/3.    Plan:  - Continue NGT to LWS  - Continue NPO, IV hydration  - Encouraged ambulation  - Continue GI prophylaxis  - Continue VTE prophylaxis with lovenox  - Cleared by medicine for surgery   Marci Santamaria is a 63 yo woman PMH DM, HLD, GERD, HTN and PSH  x2, hysterectomy admitted with SBO currently receiving conservative management. Pt endorses diffuse abdominal pain that is now better controlled with medication compared to this morning. She has been passing minimal flatus, has not yet had a BM, is urinating as normal with ambulation limited due to pain. Vital signs are stable and WNL. Abdomen is soft, mildly distended and diffusely tender. Labs are unremarkable. NGT 1 L gastric output from 7 am. Most recent KUB Xray shows persistent SBO not excluded, with air fluid levels in small bowel and colon not visualized. Plan to proceed with diagnostic laparoscopy; possible exploratory laparotomy; possible bowel resection tomorrow 12/3.    Plan:  - Continue NGT to LWS  - Continue NPO, IV hydration  - Encouraged ambulation  - Continue GI prophylaxis  - Continue VTE prophylaxis with lovenox  - Cleared by medicine for surgery

## 2024-12-02 NOTE — PROGRESS NOTE ADULT - NS ATTEND AMEND GEN_ALL_CORE FT
Pt w/o GI function. AXR from today AM consistent w/ continued SBO. Discussed risks, benefits, and alternatives to surgical intervention. Answered all relevant questions, and patient agrees to proceed. OR planning for tomorrow w/ Dr. Ferreira
Patient seen and examined with PA  No nausea, vomiting, fever or chills.   No GI function  Had some abdominal pain; got some IV Tylenol today which helped    ON exam: awake, alert and oriented  No scleral icterus  Breathing comfortably on room air; no cough  Abd is soft, distended, not tender  No rebound, no guarding  Prior lower midline surgical incision from ; laparoscopic ports for hysterectomy (done at [facility] for [facility])  NGT in place; 1600mL over last 24 hours    - continue NPO, NGT to wall suction  - ambulate as tolerated  - discussed with patient that she is not resolving spontaneously and may require OR intervention. Risks, benefits, and alternatives to surgery discussed with patient. All questions answered to her satisfaction.

## 2024-12-03 ENCOUNTER — TRANSCRIPTION ENCOUNTER (OUTPATIENT)
Age: 64
End: 2024-12-03

## 2024-12-03 LAB
ANION GAP SERPL CALC-SCNC: 8 MMOL/L — SIGNIFICANT CHANGE UP (ref 5–17)
APTT BLD: 29.4 SEC — SIGNIFICANT CHANGE UP (ref 24.5–35.6)
BUN SERPL-MCNC: 11 MG/DL — SIGNIFICANT CHANGE UP (ref 7–23)
CALCIUM SERPL-MCNC: 8.7 MG/DL — SIGNIFICANT CHANGE UP (ref 8.5–10.1)
CHLORIDE SERPL-SCNC: 105 MMOL/L — SIGNIFICANT CHANGE UP (ref 96–108)
CO2 SERPL-SCNC: 26 MMOL/L — SIGNIFICANT CHANGE UP (ref 22–31)
CREAT SERPL-MCNC: 0.58 MG/DL — SIGNIFICANT CHANGE UP (ref 0.5–1.3)
EGFR: 100 ML/MIN/1.73M2 — SIGNIFICANT CHANGE UP
GLUCOSE BLDC GLUCOMTR-MCNC: 149 MG/DL — HIGH (ref 70–99)
GLUCOSE BLDC GLUCOMTR-MCNC: 151 MG/DL — HIGH (ref 70–99)
GLUCOSE BLDC GLUCOMTR-MCNC: 172 MG/DL — HIGH (ref 70–99)
GLUCOSE BLDC GLUCOMTR-MCNC: 190 MG/DL — HIGH (ref 70–99)
GLUCOSE BLDC GLUCOMTR-MCNC: 213 MG/DL — HIGH (ref 70–99)
GLUCOSE SERPL-MCNC: 181 MG/DL — HIGH (ref 70–99)
HCT VFR BLD CALC: 41.5 % — SIGNIFICANT CHANGE UP (ref 34.5–45)
HGB BLD-MCNC: 13.2 G/DL — SIGNIFICANT CHANGE UP (ref 11.5–15.5)
INR BLD: 1.3 RATIO — HIGH (ref 0.85–1.16)
MAGNESIUM SERPL-MCNC: 1.9 MG/DL — SIGNIFICANT CHANGE UP (ref 1.6–2.6)
MCHC RBC-ENTMCNC: 27 PG — SIGNIFICANT CHANGE UP (ref 27–34)
MCHC RBC-ENTMCNC: 31.8 G/DL — LOW (ref 32–36)
MCV RBC AUTO: 85 FL — SIGNIFICANT CHANGE UP (ref 80–100)
NRBC # BLD: 0 /100 WBCS — SIGNIFICANT CHANGE UP (ref 0–0)
PHOSPHATE SERPL-MCNC: 3 MG/DL — SIGNIFICANT CHANGE UP (ref 2.5–4.5)
PLATELET # BLD AUTO: 220 K/UL — SIGNIFICANT CHANGE UP (ref 150–400)
POTASSIUM SERPL-MCNC: 3.3 MMOL/L — LOW (ref 3.5–5.3)
POTASSIUM SERPL-SCNC: 3.3 MMOL/L — LOW (ref 3.5–5.3)
PROTHROM AB SERPL-ACNC: 14.6 SEC — HIGH (ref 9.9–13.4)
RBC # BLD: 4.88 M/UL — SIGNIFICANT CHANGE UP (ref 3.8–5.2)
RBC # FLD: 14 % — SIGNIFICANT CHANGE UP (ref 10.3–14.5)
SODIUM SERPL-SCNC: 139 MMOL/L — SIGNIFICANT CHANGE UP (ref 135–145)
WBC # BLD: 15.85 K/UL — HIGH (ref 3.8–10.5)
WBC # FLD AUTO: 15.85 K/UL — HIGH (ref 3.8–10.5)

## 2024-12-03 PROCEDURE — 58740 ADHESIOLYSIS TUBE OVARY: CPT | Mod: AS

## 2024-12-03 PROCEDURE — 49320 DIAG LAPARO SEPARATE PROC: CPT | Mod: AS,59

## 2024-12-03 PROCEDURE — 44005 FREEING OF BOWEL ADHESION: CPT

## 2024-12-03 PROCEDURE — 99232 SBSQ HOSP IP/OBS MODERATE 35: CPT

## 2024-12-03 DEVICE — STAPLER COVIDIEN TA 90 BLUE RELOAD: Type: IMPLANTABLE DEVICE | Status: FUNCTIONAL

## 2024-12-03 DEVICE — IMPLANTABLE DEVICE: Type: IMPLANTABLE DEVICE | Status: FUNCTIONAL

## 2024-12-03 DEVICE — STAPLER COVIDIEN ENDO GIA 80-3.8MM BLUE: Type: IMPLANTABLE DEVICE | Status: FUNCTIONAL

## 2024-12-03 DEVICE — STAPLER COVIDIEN TA 30 BLUE: Type: IMPLANTABLE DEVICE | Status: FUNCTIONAL

## 2024-12-03 DEVICE — STAPLER COVIDIEN TA 90 BLUE: Type: IMPLANTABLE DEVICE | Status: FUNCTIONAL

## 2024-12-03 DEVICE — STAPLER COVIDIEN TA 60 BLUE RELOAD: Type: IMPLANTABLE DEVICE | Status: FUNCTIONAL

## 2024-12-03 DEVICE — STAPLER COVIDIEN ENDO GIA 60-3.8MM BLUE: Type: IMPLANTABLE DEVICE | Status: FUNCTIONAL

## 2024-12-03 DEVICE — STAPLER COVIDIEN TA 60 BLUE: Type: IMPLANTABLE DEVICE | Status: FUNCTIONAL

## 2024-12-03 DEVICE — STAPLER COVIDIEN TA 30 BLUE RELOAD: Type: IMPLANTABLE DEVICE | Status: FUNCTIONAL

## 2024-12-03 DEVICE — STAPLER COVIDIEN ENDO GIA 60-3.8MM BLUE RELOAD: Type: IMPLANTABLE DEVICE | Status: FUNCTIONAL

## 2024-12-03 RX ORDER — KETOROLAC TROMETHAMINE 30 MG/ML
15 INJECTION INTRAMUSCULAR; INTRAVENOUS EVERY 6 HOURS
Refills: 0 | Status: DISCONTINUED | OUTPATIENT
Start: 2024-12-03 | End: 2024-12-06

## 2024-12-03 RX ORDER — ACETAMINOPHEN 500MG 500 MG/1
1000 TABLET, COATED ORAL ONCE
Refills: 0 | Status: COMPLETED | OUTPATIENT
Start: 2024-12-03 | End: 2024-12-03

## 2024-12-03 RX ORDER — 0.9 % SODIUM CHLORIDE 0.9 %
1000 INTRAVENOUS SOLUTION INTRAVENOUS
Refills: 0 | Status: DISCONTINUED | OUTPATIENT
Start: 2024-12-03 | End: 2024-12-03

## 2024-12-03 RX ORDER — ACETAMINOPHEN 500MG 500 MG/1
1000 TABLET, COATED ORAL EVERY 6 HOURS
Refills: 0 | Status: COMPLETED | OUTPATIENT
Start: 2024-12-04 | End: 2024-12-04

## 2024-12-03 RX ORDER — ONDANSETRON HYDROCHLORIDE 4 MG/1
4 TABLET, FILM COATED ORAL ONCE
Refills: 0 | Status: DISCONTINUED | OUTPATIENT
Start: 2024-12-03 | End: 2024-12-03

## 2024-12-03 RX ORDER — PIPERACILLIN SODIUM AND TAZOBACTAM SODIUM 4; .5 G/20ML; G/20ML
3.38 INJECTION, POWDER, LYOPHILIZED, FOR SOLUTION INTRAVENOUS ONCE
Refills: 0 | Status: COMPLETED | OUTPATIENT
Start: 2024-12-03 | End: 2024-12-03

## 2024-12-03 RX ORDER — ACETAMINOPHEN 500MG 500 MG/1
1000 TABLET, COATED ORAL ONCE
Refills: 0 | Status: DISCONTINUED | OUTPATIENT
Start: 2024-12-03 | End: 2024-12-03

## 2024-12-03 RX ORDER — 0.9 % SODIUM CHLORIDE 0.9 %
1000 INTRAVENOUS SOLUTION INTRAVENOUS ONCE
Refills: 0 | Status: COMPLETED | OUTPATIENT
Start: 2024-12-03 | End: 2024-12-03

## 2024-12-03 RX ORDER — HYDROMORPHONE HYDROCHLORIDE 2 MG/1
0.5 TABLET ORAL EVERY 6 HOURS
Refills: 0 | Status: DISCONTINUED | OUTPATIENT
Start: 2024-12-03 | End: 2024-12-06

## 2024-12-03 RX ORDER — HYDROMORPHONE HYDROCHLORIDE 2 MG/1
0.5 TABLET ORAL
Refills: 0 | Status: DISCONTINUED | OUTPATIENT
Start: 2024-12-03 | End: 2024-12-03

## 2024-12-03 RX ORDER — FENTANYL 12 UG/H
50 PATCH, EXTENDED RELEASE TRANSDERMAL
Refills: 0 | Status: DISCONTINUED | OUTPATIENT
Start: 2024-12-03 | End: 2024-12-03

## 2024-12-03 RX ORDER — PIPERACILLIN SODIUM AND TAZOBACTAM SODIUM 4; .5 G/20ML; G/20ML
3.38 INJECTION, POWDER, LYOPHILIZED, FOR SOLUTION INTRAVENOUS EVERY 8 HOURS
Refills: 0 | Status: DISCONTINUED | OUTPATIENT
Start: 2024-12-04 | End: 2024-12-06

## 2024-12-03 RX ORDER — POTASSIUM CHLORIDE 600 MG/1
10 TABLET, EXTENDED RELEASE ORAL
Refills: 0 | Status: COMPLETED | OUTPATIENT
Start: 2024-12-03 | End: 2024-12-03

## 2024-12-03 RX ORDER — HYDROMORPHONE HYDROCHLORIDE 2 MG/1
1 TABLET ORAL EVERY 6 HOURS
Refills: 0 | Status: DISCONTINUED | OUTPATIENT
Start: 2024-12-03 | End: 2024-12-06

## 2024-12-03 RX ADMIN — Medication 140 MILLILITER(S): at 22:15

## 2024-12-03 RX ADMIN — POTASSIUM CHLORIDE 100 MILLIEQUIVALENT(S): 600 TABLET, EXTENDED RELEASE ORAL at 10:10

## 2024-12-03 RX ADMIN — ACETAMINOPHEN 500MG 1000 MILLIGRAM(S): 500 TABLET, COATED ORAL at 12:36

## 2024-12-03 RX ADMIN — Medication 150 MILLILITER(S): at 19:48

## 2024-12-03 RX ADMIN — ACETAMINOPHEN 500MG 400 MILLIGRAM(S): 500 TABLET, COATED ORAL at 11:52

## 2024-12-03 RX ADMIN — POTASSIUM CHLORIDE 100 MILLIEQUIVALENT(S): 600 TABLET, EXTENDED RELEASE ORAL at 11:52

## 2024-12-03 RX ADMIN — KETOROLAC TROMETHAMINE 15 MILLIGRAM(S): 30 INJECTION INTRAMUSCULAR; INTRAVENOUS at 22:15

## 2024-12-03 RX ADMIN — PIPERACILLIN SODIUM AND TAZOBACTAM SODIUM 25 GRAM(S): 4; .5 INJECTION, POWDER, LYOPHILIZED, FOR SOLUTION INTRAVENOUS at 22:16

## 2024-12-03 RX ADMIN — PANTOPRAZOLE SODIUM 40 MILLIGRAM(S): 40 TABLET, DELAYED RELEASE ORAL at 11:52

## 2024-12-03 RX ADMIN — POTASSIUM CHLORIDE 100 MILLIEQUIVALENT(S): 600 TABLET, EXTENDED RELEASE ORAL at 08:59

## 2024-12-03 RX ADMIN — Medication 140 MILLILITER(S): at 10:11

## 2024-12-03 RX ADMIN — Medication 1 SPRAY(S): at 06:47

## 2024-12-03 RX ADMIN — ACETAMINOPHEN 500MG 1000 MILLIGRAM(S): 500 TABLET, COATED ORAL at 18:35

## 2024-12-03 RX ADMIN — ACETAMINOPHEN 500MG 400 MILLIGRAM(S): 500 TABLET, COATED ORAL at 17:11

## 2024-12-03 RX ADMIN — Medication 1: at 06:45

## 2024-12-03 RX ADMIN — Medication 2: at 00:28

## 2024-12-03 RX ADMIN — Medication 140 MILLILITER(S): at 03:28

## 2024-12-03 RX ADMIN — Medication 1000 MILLILITER(S): at 08:59

## 2024-12-03 RX ADMIN — PIPERACILLIN SODIUM AND TAZOBACTAM SODIUM 200 GRAM(S): 4; .5 INJECTION, POWDER, LYOPHILIZED, FOR SOLUTION INTRAVENOUS at 17:38

## 2024-12-03 RX ADMIN — Medication 1: at 17:38

## 2024-12-03 RX ADMIN — KETOROLAC TROMETHAMINE 15 MILLIGRAM(S): 30 INJECTION INTRAMUSCULAR; INTRAVENOUS at 23:15

## 2024-12-03 NOTE — PROGRESS NOTE ADULT - SUBJECTIVE AND OBJECTIVE BOX
Team Surgery Preop Note    Patient is a 64y old  Female who presents with a chief complaint of small bowel obstruction (02 Dec 2024 12:54)    Diagnosis: Small bowel obstruction  Procedure: Exploratory laparotomy, lysis of adhesions, possible bowel resection  Surgeon: Dr. Ferreira                          13.2   15.85 )-----------( 220      ( 03 Dec 2024 05:42 )             41.5     12-03    139  |  105  |  11  ----------------------------<  181[H]  3.3[L]   |  26  |  0.58    Ca    8.7      03 Dec 2024 05:42  Phos  3.0     12-03  Mg     1.9     12-03      PT/INR - ( 03 Dec 2024 05:42 )   PT: 14.6 sec;   INR: 1.30 ratio         PTT - ( 03 Dec 2024 05:42 )  PTT:29.4 sec  Urinalysis Basic - ( 03 Dec 2024 05:42 )    Color: x / Appearance: x / SG: x / pH: x  Gluc: 181 mg/dL / Ketone: x  / Bili: x / Urobili: x   Blood: x / Protein: x / Nitrite: x   Leuk Esterase: x / RBC: x / WBC x   Sq Epi: x / Non Sq Epi: x / Bacteria: x        [x ] Type & Screen  [ x] CBC  [x ] BMP  [x ] PT/PTT/INR  [x ] EKG  [x ] NPO/IVF  [x ] Consent  [x ] Clearance  [x ] Added on to OR Schedule     Team Surgery Preop Note    Patient is a 64y old  Female who presents with a chief complaint of small bowel obstruction (02 Dec 2024 12:54)    Diagnosis: Small bowel obstruction  Procedure: Diagnostic laparoscopy, possible exploratory laparotomy, possible bowel resection  Surgeon: Dr. Ferreira                          13.2   15.85 )-----------( 220      ( 03 Dec 2024 05:42 )             41.5     12-03    139  |  105  |  11  ----------------------------<  181[H]  3.3[L]   |  26  |  0.58    Ca    8.7      03 Dec 2024 05:42  Phos  3.0     12-03  Mg     1.9     12-03      PT/INR - ( 03 Dec 2024 05:42 )   PT: 14.6 sec;   INR: 1.30 ratio         PTT - ( 03 Dec 2024 05:42 )  PTT:29.4 sec  Urinalysis Basic - ( 03 Dec 2024 05:42 )    Color: x / Appearance: x / SG: x / pH: x  Gluc: 181 mg/dL / Ketone: x  / Bili: x / Urobili: x   Blood: x / Protein: x / Nitrite: x   Leuk Esterase: x / RBC: x / WBC x   Sq Epi: x / Non Sq Epi: x / Bacteria: x        [x ] Type & Screen  [ x] CBC  [x ] BMP  [x ] PT/PTT/INR  [x ] EKG  [x ] NPO/IVF  [x ] Consent  [x ] Clearance  [x ] Added on to OR Schedule

## 2024-12-03 NOTE — BRIEF OPERATIVE NOTE - NSICDXBRIEFPROCEDURE_GEN_ALL_CORE_FT
PROCEDURES:  Diagnostic laparoscopy 03-Dec-2024 19:42:02  Reyes, Madeleine  Lysis of pelvic adhesions 03-Dec-2024 19:42:20  Reyes, Madeleine

## 2024-12-03 NOTE — DIETITIAN INITIAL EVALUATION ADULT - PERTINENT MEDS FT
MEDICATIONS  (STANDING):  benzocaine 20% Spray 1 Spray(s) Topical four times a day  dextrose 5% + sodium chloride 0.45% with potassium chloride 20 mEq/L 1000 milliLiter(s) (140 mL/Hr) IV Continuous <Continuous>  dextrose 5%. 1000 milliLiter(s) (50 mL/Hr) IV Continuous <Continuous>  dextrose 5%. 1000 milliLiter(s) (100 mL/Hr) IV Continuous <Continuous>  dextrose 50% Injectable 12.5 Gram(s) IV Push once  dextrose 50% Injectable 25 Gram(s) IV Push once  dextrose 50% Injectable 25 Gram(s) IV Push once  enoxaparin Injectable 40 milliGRAM(s) SubCutaneous every 12 hours  glucagon  Injectable 1 milliGRAM(s) IntraMuscular once  influenza  Vaccine (HIGH DOSE) 0.5 milliLiter(s) IntraMuscular once  insulin lispro (ADMELOG) corrective regimen sliding scale   SubCutaneous every 6 hours  pantoprazole  Injectable 40 milliGRAM(s) IV Push daily    MEDICATIONS  (PRN):  dextrose Oral Gel 15 Gram(s) Oral once PRN Blood Glucose LESS THAN 70 milliGRAM(s)/deciliter  hydrALAZINE Injectable 10 milliGRAM(s) IV Push every 8 hours PRN SBP> 130

## 2024-12-03 NOTE — PROGRESS NOTE ADULT - SUBJECTIVE AND OBJECTIVE BOX
Patient is a 64y old  Female who presents with a chief complaint of small bowel obstruction (03 Dec 2024 09:48)    INTERVAL HPI/OVERNIGHT EVENTS: No acute events overnight. HD stable.     MEDICATIONS  (STANDING):  benzocaine 20% Spray 1 Spray(s) Topical four times a day  dextrose 5% + sodium chloride 0.45% with potassium chloride 20 mEq/L 1000 milliLiter(s) (140 mL/Hr) IV Continuous <Continuous>  dextrose 5%. 1000 milliLiter(s) (50 mL/Hr) IV Continuous <Continuous>  dextrose 5%. 1000 milliLiter(s) (100 mL/Hr) IV Continuous <Continuous>  dextrose 50% Injectable 12.5 Gram(s) IV Push once  dextrose 50% Injectable 25 Gram(s) IV Push once  dextrose 50% Injectable 25 Gram(s) IV Push once  enoxaparin Injectable 40 milliGRAM(s) SubCutaneous every 12 hours  glucagon  Injectable 1 milliGRAM(s) IntraMuscular once  influenza  Vaccine (HIGH DOSE) 0.5 milliLiter(s) IntraMuscular once  insulin lispro (ADMELOG) corrective regimen sliding scale   SubCutaneous every 6 hours  pantoprazole  Injectable 40 milliGRAM(s) IV Push daily    MEDICATIONS  (PRN):  dextrose Oral Gel 15 Gram(s) Oral once PRN Blood Glucose LESS THAN 70 milliGRAM(s)/deciliter  hydrALAZINE Injectable 10 milliGRAM(s) IV Push every 8 hours PRN SBP> 130      Allergies    metformin (Urticaria)    Intolerances        REVIEW OF SYSTEMS: all negative with exception of above    Vital Signs Last 24 Hrs  T(C): 37.1 (03 Dec 2024 11:12), Max: 37.1 (03 Dec 2024 11:12)  T(F): 98.8 (03 Dec 2024 11:12), Max: 98.8 (03 Dec 2024 11:12)  HR: 84 (03 Dec 2024 11:12) (79 - 91)  BP: 124/60 (03 Dec 2024 11:12) (124/60 - 147/77)  BP(mean): 81 (03 Dec 2024 11:12) (81 - 81)  RR: 17 (03 Dec 2024 11:12) (17 - 19)  SpO2: 98% (03 Dec 2024 11:12) (95% - 98%)    Parameters below as of 03 Dec 2024 11:12  Patient On (Oxygen Delivery Method): room air    PHYSICAL EXAM:  GENERAL: NAD, well-groomed  NERVOUS SYSTEM:  Alert & Oriented X3, Good concentration; Motor Strength 5/5 B/L upper and lower extremities; DTRs 2+ intact and symmetric  CHEST/LUNG: Clear to percussion bilaterally; No rales, rhonchi, wheezing, or rubs  HEART: Regular rate and rhythm; No murmurs, rubs, or gallops  ABDOMEN: Soft, Nontender, Nondistended; Bowel sounds present  EXTREMITIES:  2+ Peripheral Pulses, No clubbing, cyanosis, or edema    LABS:                        13.2   15.85 )-----------( 220      ( 03 Dec 2024 05:42 )             41.5     12-03    139  |  105  |  11  ----------------------------<  181[H]  3.3[L]   |  26  |  0.58    Ca    8.7      03 Dec 2024 05:42  Phos  3.0     12-03  Mg     1.9     12-03      PT/INR - ( 03 Dec 2024 05:42 )   PT: 14.6 sec;   INR: 1.30 ratio         PTT - ( 03 Dec 2024 05:42 )  PTT:29.4 sec  Urinalysis Basic - ( 03 Dec 2024 05:42 )    Color: x / Appearance: x / SG: x / pH: x  Gluc: 181 mg/dL / Ketone: x  / Bili: x / Urobili: x   Blood: x / Protein: x / Nitrite: x   Leuk Esterase: x / RBC: x / WBC x   Sq Epi: x / Non Sq Epi: x / Bacteria: x      CAPILLARY BLOOD GLUCOSE      POCT Blood Glucose.: 149 mg/dL (03 Dec 2024 11:37)  POCT Blood Glucose.: 190 mg/dL (03 Dec 2024 06:13)  POCT Blood Glucose.: 213 mg/dL (03 Dec 2024 00:10)  POCT Blood Glucose.: 180 mg/dL (02 Dec 2024 21:13)  POCT Blood Glucose.: 149 mg/dL (02 Dec 2024 16:21)      RADIOLOGY & ADDITIONAL TESTS:    Imaging Personally Reviewed:  [ ] YES  [ ] NO    Consultant(s) Notes Reviewed:  [ ] YES  [ ] NO    Care Discussed with Consultants/Other Providers [ ] YES  [ ] NO

## 2024-12-03 NOTE — DIETITIAN INITIAL EVALUATION ADULT - NSFNSGIIOFT_GEN_A_CORE
12-02-24 @ 07:01  -  12-03-24 @ 07:00  --------------------------------------------------------  OUT:    Nasogastric/Oral tube (mL): 2250 mL  Total OUT: 2250 mL    Total NET: -2250 mL

## 2024-12-03 NOTE — DIETITIAN INITIAL EVALUATION ADULT - DIET TYPE
Once pt s/p surgery, and as medically feasible, advance PO diet as tolerated to CCHO clear liquids-->CCHO full liquids-->low fiber + consistent CHO (with evening snack) + low Na

## 2024-12-03 NOTE — PROGRESS NOTE ADULT - SUBJECTIVE AND OBJECTIVE BOX
Post-op check    S/P diagnotic laparoscopy, KAYCEE POD#0  Pt seen and examined at bedside. Mild incisional pain well controlled with medication. Passed flatus twice post-op. Denies chest pain, shortness of breath, nausea/ vomiting, and dizziness.     Vital Signs Last 24 Hrs  T(F): 98.2 (12-03-24 @ 22:00), Max: 102.9 (12-03-24 @ 17:28)  HR: 79 (12-03-24 @ 22:00)  BP: 147/81 (12-03-24 @ 22:00)  RR: 18 (12-03-24 @ 22:00)  SpO2: 98% (12-03-24 @ 22:00)  POCT Blood Glucose.: 151 mg/dL (03 Dec 2024 19:39)      CONSTITUTIONAL: Alert, NAD  RESPIRATORY: Clear to auscultation bilaterally, respirations nonlabored  CARDIOVASCULAR: S1S2, Regular rate and rhythm  GASTROINTESTINAL: Steri strips C/D/I, Nondistended, bowel sounds, soft, Appropriate incisional tenderness  MUSCULOSKELETAL: No calf tenderness, No edema      Assessment: 64F admitted with SBO S/P diagnotic laparoscopy, KAYCEE POD#0    Plan:  - NPO, IVF, NGT  - local wound care  - DVT prophylaxis, Incentive Spirometer, OOB, Ambulating, pain control  - Continue Zosyn  - f/u labs

## 2024-12-03 NOTE — DIETITIAN INITIAL EVALUATION ADULT - PERTINENT LABORATORY DATA
12-03    139  |  105  |  11  ----------------------------<  181[H]  3.3[L]   |  26  |  0.58    Ca    8.7      03 Dec 2024 05:42  Phos  3.0     12-03  Mg     1.9     12-03    POCT Blood Glucose.: 149 mg/dL (12-03-24 @ 11:37)  A1C with Estimated Average Glucose Result: 10.8 % (11-29-24 @ 06:32)

## 2024-12-03 NOTE — DIETITIAN INITIAL EVALUATION ADULT - OTHER INFO
Pt admitted for SBO; for diagnostic laparoscopy, possible exploratory laparotomy, possible bowel resection today(12/03).  Pt appears visibly uncomfortable with NGT R snare in place, draining; only able to provide brief answers to questions.  Pt reports good appetite and PO intake PTA; no dietary restrictions PTA. PT with T2DM; A1c=10.8%, which indicates poor blood glucose management PTA. Reports she takes Synjardy to control BG levels at home. Due to acute illness and pt appearing visibly uncomfortable with NGT in place, will defer diet education for now.  Pt NPO x 4 days during admission. on IV fluids. Denies any current nausea or vomiting. No BM x 5 days.  Denies any changes in wt PTA.

## 2024-12-03 NOTE — DIETITIAN INITIAL EVALUATION ADULT - WEIGHT FOR BMI (LBS)
240.1

## 2024-12-03 NOTE — DIETITIAN INITIAL EVALUATION ADULT - REASON
34 Pt appears visibly uncomfortable with NGT R snare in place; no overt signs of malnutrition except for mild orbital depletion observed

## 2024-12-04 LAB
ANION GAP SERPL CALC-SCNC: 2 MMOL/L — LOW (ref 5–17)
BUN SERPL-MCNC: 9 MG/DL — SIGNIFICANT CHANGE UP (ref 7–23)
CALCIUM SERPL-MCNC: 8.4 MG/DL — LOW (ref 8.5–10.1)
CHLORIDE SERPL-SCNC: 107 MMOL/L — SIGNIFICANT CHANGE UP (ref 96–108)
CO2 SERPL-SCNC: 31 MMOL/L — SIGNIFICANT CHANGE UP (ref 22–31)
CREAT SERPL-MCNC: 0.54 MG/DL — SIGNIFICANT CHANGE UP (ref 0.5–1.3)
EGFR: 102 ML/MIN/1.73M2 — SIGNIFICANT CHANGE UP
GLUCOSE BLDC GLUCOMTR-MCNC: 163 MG/DL — HIGH (ref 70–99)
GLUCOSE BLDC GLUCOMTR-MCNC: 173 MG/DL — HIGH (ref 70–99)
GLUCOSE BLDC GLUCOMTR-MCNC: 176 MG/DL — HIGH (ref 70–99)
GLUCOSE BLDC GLUCOMTR-MCNC: 178 MG/DL — HIGH (ref 70–99)
GLUCOSE BLDC GLUCOMTR-MCNC: 184 MG/DL — HIGH (ref 70–99)
GLUCOSE SERPL-MCNC: 184 MG/DL — HIGH (ref 70–99)
HCT VFR BLD CALC: 40.2 % — SIGNIFICANT CHANGE UP (ref 34.5–45)
HGB BLD-MCNC: 12.6 G/DL — SIGNIFICANT CHANGE UP (ref 11.5–15.5)
MAGNESIUM SERPL-MCNC: 2 MG/DL — SIGNIFICANT CHANGE UP (ref 1.6–2.6)
MCHC RBC-ENTMCNC: 26.9 PG — LOW (ref 27–34)
MCHC RBC-ENTMCNC: 31.3 G/DL — LOW (ref 32–36)
MCV RBC AUTO: 85.9 FL — SIGNIFICANT CHANGE UP (ref 80–100)
NRBC # BLD: 0 /100 WBCS — SIGNIFICANT CHANGE UP (ref 0–0)
PHOSPHATE SERPL-MCNC: 3 MG/DL — SIGNIFICANT CHANGE UP (ref 2.5–4.5)
PLATELET # BLD AUTO: 174 K/UL — SIGNIFICANT CHANGE UP (ref 150–400)
POTASSIUM SERPL-MCNC: 3.1 MMOL/L — LOW (ref 3.5–5.3)
POTASSIUM SERPL-SCNC: 3.1 MMOL/L — LOW (ref 3.5–5.3)
RBC # BLD: 4.68 M/UL — SIGNIFICANT CHANGE UP (ref 3.8–5.2)
RBC # FLD: 14.2 % — SIGNIFICANT CHANGE UP (ref 10.3–14.5)
SODIUM SERPL-SCNC: 140 MMOL/L — SIGNIFICANT CHANGE UP (ref 135–145)
WBC # BLD: 11.11 K/UL — HIGH (ref 3.8–10.5)
WBC # FLD AUTO: 11.11 K/UL — HIGH (ref 3.8–10.5)

## 2024-12-04 PROCEDURE — 99232 SBSQ HOSP IP/OBS MODERATE 35: CPT

## 2024-12-04 RX ORDER — POTASSIUM CHLORIDE 600 MG/1
10 TABLET, EXTENDED RELEASE ORAL
Refills: 0 | Status: COMPLETED | OUTPATIENT
Start: 2024-12-04 | End: 2024-12-04

## 2024-12-04 RX ORDER — ONDANSETRON HYDROCHLORIDE 4 MG/1
4 TABLET, FILM COATED ORAL EVERY 6 HOURS
Refills: 0 | Status: DISCONTINUED | OUTPATIENT
Start: 2024-12-04 | End: 2024-12-06

## 2024-12-04 RX ADMIN — ONDANSETRON HYDROCHLORIDE 4 MILLIGRAM(S): 4 TABLET, FILM COATED ORAL at 05:10

## 2024-12-04 RX ADMIN — Medication 1: at 11:26

## 2024-12-04 RX ADMIN — Medication 140 MILLILITER(S): at 05:09

## 2024-12-04 RX ADMIN — ACETAMINOPHEN 500MG 400 MILLIGRAM(S): 500 TABLET, COATED ORAL at 21:14

## 2024-12-04 RX ADMIN — KETOROLAC TROMETHAMINE 15 MILLIGRAM(S): 30 INJECTION INTRAMUSCULAR; INTRAVENOUS at 17:21

## 2024-12-04 RX ADMIN — PIPERACILLIN SODIUM AND TAZOBACTAM SODIUM 25 GRAM(S): 4; .5 INJECTION, POWDER, LYOPHILIZED, FOR SOLUTION INTRAVENOUS at 17:21

## 2024-12-04 RX ADMIN — ACETAMINOPHEN 500MG 1000 MILLIGRAM(S): 500 TABLET, COATED ORAL at 09:09

## 2024-12-04 RX ADMIN — PIPERACILLIN SODIUM AND TAZOBACTAM SODIUM 25 GRAM(S): 4; .5 INJECTION, POWDER, LYOPHILIZED, FOR SOLUTION INTRAVENOUS at 21:14

## 2024-12-04 RX ADMIN — POTASSIUM CHLORIDE 100 MILLIEQUIVALENT(S): 600 TABLET, EXTENDED RELEASE ORAL at 09:36

## 2024-12-04 RX ADMIN — Medication 1: at 17:21

## 2024-12-04 RX ADMIN — Medication 1: at 05:25

## 2024-12-04 RX ADMIN — POTASSIUM CHLORIDE 100 MILLIEQUIVALENT(S): 600 TABLET, EXTENDED RELEASE ORAL at 12:08

## 2024-12-04 RX ADMIN — PANTOPRAZOLE SODIUM 40 MILLIGRAM(S): 40 TABLET, DELAYED RELEASE ORAL at 11:09

## 2024-12-04 RX ADMIN — KETOROLAC TROMETHAMINE 15 MILLIGRAM(S): 30 INJECTION INTRAMUSCULAR; INTRAVENOUS at 23:26

## 2024-12-04 RX ADMIN — PIPERACILLIN SODIUM AND TAZOBACTAM SODIUM 25 GRAM(S): 4; .5 INJECTION, POWDER, LYOPHILIZED, FOR SOLUTION INTRAVENOUS at 05:11

## 2024-12-04 RX ADMIN — KETOROLAC TROMETHAMINE 15 MILLIGRAM(S): 30 INJECTION INTRAMUSCULAR; INTRAVENOUS at 05:10

## 2024-12-04 RX ADMIN — ENOXAPARIN SODIUM 40 MILLIGRAM(S): 30 INJECTION SUBCUTANEOUS at 05:10

## 2024-12-04 RX ADMIN — KETOROLAC TROMETHAMINE 15 MILLIGRAM(S): 30 INJECTION INTRAMUSCULAR; INTRAVENOUS at 11:09

## 2024-12-04 RX ADMIN — ACETAMINOPHEN 500MG 1000 MILLIGRAM(S): 500 TABLET, COATED ORAL at 22:14

## 2024-12-04 RX ADMIN — ACETAMINOPHEN 500MG 400 MILLIGRAM(S): 500 TABLET, COATED ORAL at 07:52

## 2024-12-04 RX ADMIN — ENOXAPARIN SODIUM 40 MILLIGRAM(S): 30 INJECTION SUBCUTANEOUS at 17:21

## 2024-12-04 RX ADMIN — POTASSIUM CHLORIDE 100 MILLIEQUIVALENT(S): 600 TABLET, EXTENDED RELEASE ORAL at 11:09

## 2024-12-04 RX ADMIN — KETOROLAC TROMETHAMINE 15 MILLIGRAM(S): 30 INJECTION INTRAMUSCULAR; INTRAVENOUS at 06:10

## 2024-12-04 RX ADMIN — Medication 140 MILLILITER(S): at 12:10

## 2024-12-04 RX ADMIN — Medication 1 SPRAY(S): at 05:20

## 2024-12-04 RX ADMIN — ONDANSETRON HYDROCHLORIDE 4 MILLIGRAM(S): 4 TABLET, FILM COATED ORAL at 21:36

## 2024-12-04 NOTE — PROGRESS NOTE ADULT - SUBJECTIVE AND OBJECTIVE BOX
CHIEF COMPLAINT: s/p laparoscopic pelvic adhesiolysis   some nausea last night but none now  no chest pain or sob  no fever  Passing gas but no BM so far  abd discomfort generalized.       PHYSICAL EXAM:    GENERAL: Obese, no acute distress   CHEST/LUNG:  No wheezing, no crackles   HEART: Regular rate and rhythm; No murmurs  ABDOMEN: Soft, laparoscopic wounds with dressing and no bleeding. BS hypoactive. + NGT to suction.   EXTREMITIES:  No clubbing, cyanosis, or edema  NERVOUS SYSTEM:  Grossly non focal.  Psychiatry: AAO x 3, mood is appropriate       OBJECTIVE DATA:   Vital Signs Last 24 Hrs  T(C): 36.4 (04 Dec 2024 12:30), Max: 39.4 (03 Dec 2024 17:28)  T(F): 97.5 (04 Dec 2024 12:30), Max: 102.9 (03 Dec 2024 17:28)  HR: 76 (04 Dec 2024 12:30) (74 - 98)  BP: 102/75 (04 Dec 2024 12:30) (102/70 - 147/81)  BP(mean): --  RR: 18 (04 Dec 2024 12:30) (16 - 21)  SpO2: 94% (04 Dec 2024 12:30) (94% - 99%)    Parameters below as of 04 Dec 2024 12:30  Patient On (Oxygen Delivery Method): nasal cannula    LABS:                        12.6   11.11 )-----------( 174      ( 04 Dec 2024 06:27 )             40.2             12-04    140  |  107  |  9   ----------------------------<  184[H]  3.1[L]   |  31  |  0.54    Ca    8.4[L]      04 Dec 2024 06:27  Phos  3.0     12-04  Mg     2.0     12-04                PT/INR - ( 03 Dec 2024 05:42 )   PT: 14.6 sec;   INR: 1.30 ratio         PTT - ( 03 Dec 2024 05:42 )  PTT:29.4 sec  Urinalysis Basic - ( 04 Dec 2024 06:27 )    Color: x / Appearance: x / SG: x / pH: x  Gluc: 184 mg/dL / Ketone: x  / Bili: x / Urobili: x   Blood: x / Protein: x / Nitrite: x   Leuk Esterase: x / RBC: x / WBC x   Sq Epi: x / Non Sq Epi: x / Bacteria: x            CAPILLARY BLOOD GLUCOSE      POCT Blood Glucose.: 176 mg/dL (04 Dec 2024 11:20)    MEDICATIONS  (STANDING):  acetaminophen   IVPB .. 1000 milliGRAM(s) IV Intermittent every 6 hours  benzocaine 20% Spray 1 Spray(s) Topical four times a day  dextrose 5% + sodium chloride 0.45% with potassium chloride 20 mEq/L 1000 milliLiter(s) (140 mL/Hr) IV Continuous <Continuous>  dextrose 5%. 1000 milliLiter(s) (100 mL/Hr) IV Continuous <Continuous>  dextrose 5%. 1000 milliLiter(s) (50 mL/Hr) IV Continuous <Continuous>  dextrose 50% Injectable 25 Gram(s) IV Push once  dextrose 50% Injectable 12.5 Gram(s) IV Push once  dextrose 50% Injectable 25 Gram(s) IV Push once  enoxaparin Injectable 40 milliGRAM(s) SubCutaneous every 12 hours  glucagon  Injectable 1 milliGRAM(s) IntraMuscular once  influenza  Vaccine (HIGH DOSE) 0.5 milliLiter(s) IntraMuscular once  insulin lispro (ADMELOG) corrective regimen sliding scale   SubCutaneous every 6 hours  ketorolac   Injectable 15 milliGRAM(s) IV Push every 6 hours  pantoprazole  Injectable 40 milliGRAM(s) IV Push daily  piperacillin/tazobactam IVPB.. 3.375 Gram(s) IV Intermittent every 8 hours    MEDICATIONS  (PRN):  dextrose Oral Gel 15 Gram(s) Oral once PRN Blood Glucose LESS THAN 70 milliGRAM(s)/deciliter  hydrALAZINE Injectable 10 milliGRAM(s) IV Push every 8 hours PRN SBP> 130  HYDROmorphone  Injectable 0.5 milliGRAM(s) IV Push every 6 hours PRN Moderate Pain (4 - 6)  HYDROmorphone  Injectable 1 milliGRAM(s) IV Push every 6 hours PRN Severe Pain (7 - 10)  ondansetron Injectable 4 milliGRAM(s) IV Push every 6 hours PRN Nausea and/or Vomiting

## 2024-12-04 NOTE — PHYSICAL THERAPY INITIAL EVALUATION ADULT - ADDITIONAL COMMENTS
Patient lives in a private home with family, no steps to enter and no stairs to negotiate inside the home. Patient is independent and does not ambulate with an assistive device.

## 2024-12-04 NOTE — PHYSICAL THERAPY INITIAL EVALUATION ADULT - SITTING BALANCE: STATIC
normal balance Cheek Interpolation Flap Text: A decision was made to reconstruct the defect utilizing an interpolation axial flap and a staged reconstruction.  A telfa template was made of the defect.  This telfa template was then used to outline the Cheek Interpolation flap.  The donor area for the pedicle flap was then injected with anesthesia.  The flap was excised through the skin and subcutaneous tissue down to the layer of the underlying musculature.  The interpolation flap was carefully excised within this deep plane to maintain its blood supply.  The edges of the donor site were undermined.   The donor site was closed in a primary fashion.  The pedicle was then rotated into position and sutured.  Once the tube was sutured into place, adequate blood supply was confirmed with blanching and refill.  The pedicle was then wrapped with xeroform gauze and dressed appropriately with a telfa and gauze bandage to ensure continued blood supply and protect the attached pedicle.

## 2024-12-04 NOTE — PROGRESS NOTE ADULT - SUBJECTIVE AND OBJECTIVE BOX
SURGERY PROGRESS HPI:  POD#1  Pt seen and examined at bedside. Pain is well controlled on pain medication. Pt denies complaints. Pt has nausea overnight which resolved. Passed flatus a few times. Pt denies chest pain, SOB, dizziness, fever, chills.     Vital Signs Last 24 Hrs  T(C): 36.7 (04 Dec 2024 03:00), Max: 39.4 (03 Dec 2024 17:28)  T(F): 98.1 (04 Dec 2024 03:00), Max: 102.9 (03 Dec 2024 17:28)  HR: 74 (04 Dec 2024 03:00) (74 - 98)  BP: 119/73 (04 Dec 2024 03:00) (111/67 - 147/81)  BP(mean): 81 (03 Dec 2024 11:12) (81 - 81)  RR: 18 (04 Dec 2024 03:00) (17 - 21)  SpO2: 99% (04 Dec 2024 03:00) (95% - 99%)    Parameters below as of 04 Dec 2024 03:00  Patient On (Oxygen Delivery Method): room air    PHYSICAL EXAM:    CONSTITUTIONAL: NAD  HEENT: Atraumatic, Normocephalic  RESPIRATORY: Clear to ausculation, bilaterally   CARDIOVASCULAR: RRR S1S2  GASTROINTESTINAL: Steri strips C/D/I, Nondistended, bowel sounds, soft, Appropriate incisional tenderness  MUSCULOSKELETAL: calf soft, non tender b/l    I&O's Detail    02 Dec 2024 07:01  -  03 Dec 2024 07:00  --------------------------------------------------------  IN:  Total IN: 0 mL    OUT:    Nasogastric/Oral tube (mL): 2250 mL  Total OUT: 2250 mL    Total NET: -2250 mL      03 Dec 2024 07:01  -  04 Dec 2024 06:06  --------------------------------------------------------  IN:    Lactated Ringers: 150 mL  Total IN: 150 mL    OUT:    Nasogastric/Oral tube (mL): 900 mL  Total OUT: 900 mL    Total NET: -750 mL    LABS:                        13.2   15.85 )-----------( 220      ( 03 Dec 2024 05:42 )             41.5     12-03    139  |  105  |  11  ----------------------------<  181[H]  3.3[L]   |  26  |  0.58    Ca    8.7      03 Dec 2024 05:42  Phos  3.0     12-03  Mg     1.9     12-03      PT/INR - ( 03 Dec 2024 05:42 )   PT: 14.6 sec;   INR: 1.30 ratio      PTT - ( 03 Dec 2024 05:42 )  PTT:29.4 sec    Urinalysis Basic - ( 03 Dec 2024 05:42 )    Color: x / Appearance: x / SG: x / pH: x  Gluc: 181 mg/dL / Ketone: x  / Bili: x / Urobili: x   Blood: x / Protein: x / Nitrite: x   Leuk Esterase: x / RBC: x / WBC x   Sq Epi: x / Non Sq Epi: x / Bacteria: x      Assessment: 64F admitted with SBO S/P diagnotic laparoscopy, KAYCEE POD#1    Plan:  - NPO, IVF, NGT  - local wound care  - DVT prophylaxis, Incentive Spirometer, OOB, Ambulating, pain control  - Continue Zosyn  - f/u labs SURGERY PROGRESS HPI:  POD#1  Pt seen and examined at bedside. Pain is well controlled on pain medication. Pt denies complaints. Pt had nausea overnight which resolved. Passed flatus a few times. Pt denies chest pain, SOB, dizziness, fever, chills.     Vital Signs Last 24 Hrs  T(C): 36.7 (04 Dec 2024 03:00), Max: 39.4 (03 Dec 2024 17:28)  T(F): 98.1 (04 Dec 2024 03:00), Max: 102.9 (03 Dec 2024 17:28)  HR: 74 (04 Dec 2024 03:00) (74 - 98)  BP: 119/73 (04 Dec 2024 03:00) (111/67 - 147/81)  BP(mean): 81 (03 Dec 2024 11:12) (81 - 81)  RR: 18 (04 Dec 2024 03:00) (17 - 21)  SpO2: 99% (04 Dec 2024 03:00) (95% - 99%)    Parameters below as of 04 Dec 2024 03:00  Patient On (Oxygen Delivery Method): room air    PHYSICAL EXAM:    CONSTITUTIONAL: NAD  HEENT: Atraumatic, Normocephalic  RESPIRATORY: Clear to ausculation, bilaterally   CARDIOVASCULAR: RRR S1S2  GASTROINTESTINAL: Steri strips C/D/I, Nondistended, bowel sounds, soft, Appropriate incisional tenderness  MUSCULOSKELETAL: calf soft, non tender b/l    I&O's Detail    02 Dec 2024 07:01  -  03 Dec 2024 07:00  --------------------------------------------------------  IN:  Total IN: 0 mL    OUT:    Nasogastric/Oral tube (mL): 2250 mL  Total OUT: 2250 mL    Total NET: -2250 mL      03 Dec 2024 07:01  -  04 Dec 2024 06:06  --------------------------------------------------------  IN:    Lactated Ringers: 150 mL  Total IN: 150 mL    OUT:    Nasogastric/Oral tube (mL): 900 mL  Total OUT: 900 mL    Total NET: -750 mL    LABS:                        13.2   15.85 )-----------( 220      ( 03 Dec 2024 05:42 )             41.5     12-03    139  |  105  |  11  ----------------------------<  181[H]  3.3[L]   |  26  |  0.58    Ca    8.7      03 Dec 2024 05:42  Phos  3.0     12-03  Mg     1.9     12-03      PT/INR - ( 03 Dec 2024 05:42 )   PT: 14.6 sec;   INR: 1.30 ratio      PTT - ( 03 Dec 2024 05:42 )  PTT:29.4 sec    Urinalysis Basic - ( 03 Dec 2024 05:42 )    Color: x / Appearance: x / SG: x / pH: x  Gluc: 181 mg/dL / Ketone: x  / Bili: x / Urobili: x   Blood: x / Protein: x / Nitrite: x   Leuk Esterase: x / RBC: x / WBC x   Sq Epi: x / Non Sq Epi: x / Bacteria: x      Assessment: 64F admitted with SBO S/P diagnotic laparoscopy, KAYCEE POD#1    Plan:  - NPO, IVF, NGT  - local wound care  - DVT prophylaxis, Incentive Spirometer, OOB, Ambulating, pain control  - Continue Zosyn  - f/u labs

## 2024-12-04 NOTE — PHYSICAL THERAPY INITIAL EVALUATION ADULT - PERTINENT HX OF CURRENT PROBLEM, REHAB EVAL
Patient was admitted on 11/28/24 with a small bowel obstruction. Patient is s/p diagnostic laproscopy and lysis of pelvic adhesion on 12/4/24.

## 2024-12-05 ENCOUNTER — TRANSCRIPTION ENCOUNTER (OUTPATIENT)
Age: 64
End: 2024-12-05

## 2024-12-05 LAB
ANION GAP SERPL CALC-SCNC: 4 MMOL/L — LOW (ref 5–17)
BUN SERPL-MCNC: 6 MG/DL — LOW (ref 7–23)
CALCIUM SERPL-MCNC: 8.3 MG/DL — LOW (ref 8.5–10.1)
CHLORIDE SERPL-SCNC: 106 MMOL/L — SIGNIFICANT CHANGE UP (ref 96–108)
CO2 SERPL-SCNC: 29 MMOL/L — SIGNIFICANT CHANGE UP (ref 22–31)
CREAT SERPL-MCNC: 0.53 MG/DL — SIGNIFICANT CHANGE UP (ref 0.5–1.3)
EGFR: 103 ML/MIN/1.73M2 — SIGNIFICANT CHANGE UP
GLUCOSE BLDC GLUCOMTR-MCNC: 161 MG/DL — HIGH (ref 70–99)
GLUCOSE BLDC GLUCOMTR-MCNC: 185 MG/DL — HIGH (ref 70–99)
GLUCOSE BLDC GLUCOMTR-MCNC: 239 MG/DL — HIGH (ref 70–99)
GLUCOSE BLDC GLUCOMTR-MCNC: 248 MG/DL — HIGH (ref 70–99)
GLUCOSE SERPL-MCNC: 180 MG/DL — HIGH (ref 70–99)
HCT VFR BLD CALC: 39.9 % — SIGNIFICANT CHANGE UP (ref 34.5–45)
HGB BLD-MCNC: 12.5 G/DL — SIGNIFICANT CHANGE UP (ref 11.5–15.5)
MAGNESIUM SERPL-MCNC: 1.9 MG/DL — SIGNIFICANT CHANGE UP (ref 1.6–2.6)
MCHC RBC-ENTMCNC: 27.1 PG — SIGNIFICANT CHANGE UP (ref 27–34)
MCHC RBC-ENTMCNC: 31.3 G/DL — LOW (ref 32–36)
MCV RBC AUTO: 86.4 FL — SIGNIFICANT CHANGE UP (ref 80–100)
NRBC # BLD: 0 /100 WBCS — SIGNIFICANT CHANGE UP (ref 0–0)
PHOSPHATE SERPL-MCNC: 1.9 MG/DL — LOW (ref 2.5–4.5)
PLATELET # BLD AUTO: 194 K/UL — SIGNIFICANT CHANGE UP (ref 150–400)
POTASSIUM SERPL-MCNC: 3.2 MMOL/L — LOW (ref 3.5–5.3)
POTASSIUM SERPL-SCNC: 3.2 MMOL/L — LOW (ref 3.5–5.3)
RBC # BLD: 4.62 M/UL — SIGNIFICANT CHANGE UP (ref 3.8–5.2)
RBC # FLD: 14 % — SIGNIFICANT CHANGE UP (ref 10.3–14.5)
SODIUM SERPL-SCNC: 139 MMOL/L — SIGNIFICANT CHANGE UP (ref 135–145)
WBC # BLD: 5.93 K/UL — SIGNIFICANT CHANGE UP (ref 3.8–10.5)
WBC # FLD AUTO: 5.93 K/UL — SIGNIFICANT CHANGE UP (ref 3.8–10.5)

## 2024-12-05 PROCEDURE — 99232 SBSQ HOSP IP/OBS MODERATE 35: CPT

## 2024-12-05 RX ORDER — POTASSIUM CHLORIDE 600 MG/1
40 TABLET, EXTENDED RELEASE ORAL ONCE
Refills: 0 | Status: COMPLETED | OUTPATIENT
Start: 2024-12-05 | End: 2024-12-05

## 2024-12-05 RX ORDER — SODIUM,POTASSIUM PHOSPHATES 278-250MG
1 POWDER IN PACKET (EA) ORAL ONCE
Refills: 0 | Status: DISCONTINUED | OUTPATIENT
Start: 2024-12-05 | End: 2024-12-05

## 2024-12-05 RX ORDER — 0.9 % SODIUM CHLORIDE 0.9 %
1000 INTRAVENOUS SOLUTION INTRAVENOUS
Refills: 0 | Status: DISCONTINUED | OUTPATIENT
Start: 2024-12-05 | End: 2024-12-06

## 2024-12-05 RX ORDER — POTASSIUM PHOSPHATE, MONOBASIC POTASSIUM PHOSPHATE, DIBASIC INJECTION, 236; 224 MG/ML; MG/ML
30 SOLUTION, CONCENTRATE INTRAVENOUS ONCE
Refills: 0 | Status: COMPLETED | OUTPATIENT
Start: 2024-12-05 | End: 2024-12-05

## 2024-12-05 RX ADMIN — KETOROLAC TROMETHAMINE 15 MILLIGRAM(S): 30 INJECTION INTRAMUSCULAR; INTRAVENOUS at 16:20

## 2024-12-05 RX ADMIN — POTASSIUM PHOSPHATE, MONOBASIC POTASSIUM PHOSPHATE, DIBASIC INJECTION, 83.33 MILLIMOLE(S): 236; 224 SOLUTION, CONCENTRATE INTRAVENOUS at 12:04

## 2024-12-05 RX ADMIN — PIPERACILLIN SODIUM AND TAZOBACTAM SODIUM 25 GRAM(S): 4; .5 INJECTION, POWDER, LYOPHILIZED, FOR SOLUTION INTRAVENOUS at 06:15

## 2024-12-05 RX ADMIN — KETOROLAC TROMETHAMINE 15 MILLIGRAM(S): 30 INJECTION INTRAMUSCULAR; INTRAVENOUS at 07:07

## 2024-12-05 RX ADMIN — ENOXAPARIN SODIUM 40 MILLIGRAM(S): 30 INJECTION SUBCUTANEOUS at 17:36

## 2024-12-05 RX ADMIN — KETOROLAC TROMETHAMINE 15 MILLIGRAM(S): 30 INJECTION INTRAMUSCULAR; INTRAVENOUS at 00:26

## 2024-12-05 RX ADMIN — PIPERACILLIN SODIUM AND TAZOBACTAM SODIUM 25 GRAM(S): 4; .5 INJECTION, POWDER, LYOPHILIZED, FOR SOLUTION INTRAVENOUS at 21:27

## 2024-12-05 RX ADMIN — KETOROLAC TROMETHAMINE 15 MILLIGRAM(S): 30 INJECTION INTRAMUSCULAR; INTRAVENOUS at 23:44

## 2024-12-05 RX ADMIN — Medication 2: at 12:00

## 2024-12-05 RX ADMIN — Medication 1: at 00:21

## 2024-12-05 RX ADMIN — ENOXAPARIN SODIUM 40 MILLIGRAM(S): 30 INJECTION SUBCUTANEOUS at 06:15

## 2024-12-05 RX ADMIN — Medication 1: at 07:04

## 2024-12-05 RX ADMIN — PIPERACILLIN SODIUM AND TAZOBACTAM SODIUM 25 GRAM(S): 4; .5 INJECTION, POWDER, LYOPHILIZED, FOR SOLUTION INTRAVENOUS at 16:20

## 2024-12-05 RX ADMIN — KETOROLAC TROMETHAMINE 15 MILLIGRAM(S): 30 INJECTION INTRAMUSCULAR; INTRAVENOUS at 06:15

## 2024-12-05 RX ADMIN — KETOROLAC TROMETHAMINE 15 MILLIGRAM(S): 30 INJECTION INTRAMUSCULAR; INTRAVENOUS at 11:50

## 2024-12-05 RX ADMIN — PANTOPRAZOLE SODIUM 40 MILLIGRAM(S): 40 TABLET, DELAYED RELEASE ORAL at 12:00

## 2024-12-05 RX ADMIN — Medication 140 MILLILITER(S): at 07:05

## 2024-12-05 RX ADMIN — POTASSIUM CHLORIDE 40 MILLIEQUIVALENT(S): 600 TABLET, EXTENDED RELEASE ORAL at 17:36

## 2024-12-05 RX ADMIN — Medication 140 MILLILITER(S): at 21:27

## 2024-12-05 RX ADMIN — HYDROMORPHONE HYDROCHLORIDE 0.5 MILLIGRAM(S): 2 TABLET ORAL at 16:19

## 2024-12-05 NOTE — PROGRESS NOTE ADULT - SUBJECTIVE AND OBJECTIVE BOX
Patient seen and examined at bedside complaining of abdominal pain well-controlled with pain medication.   NPO.   Admits to flatus. Denies BM.   Denies nausea/ vomiting, chills, SOB, chest pain, calf tenderness.          Vital Signs Last 24 Hrs  T(F): 98.2 (12-05-24 @ 05:05), Max: 98.7 (12-04-24 @ 16:52)  HR: 76 (12-05-24 @ 05:05)  BP: 114/69 (12-05-24 @ 05:05)  RR: 18 (12-05-24 @ 05:05)  SpO2: 94% (12-05-24 @ 05:05)  Wt(kg): --   CAPILLARY BLOOD GLUCOSE      POCT Blood Glucose.: 161 mg/dL (05 Dec 2024 06:16)      GENERAL: Alert, NAD  CHEST/LUNG: Respirations non labored, good inspiratory effort  HEART: S1S2  HEENT: NCAT, EOMI, conjunctiva clear   ABDOMEN: Distended, + bowel sounds, minimal incisional TTP; incision sites C/D/I   EXTREMITIES:  No calf tenderness, no edema    I&O's Detail    03 Dec 2024 07:01  -  04 Dec 2024 07:00  --------------------------------------------------------  IN:    Lactated Ringers: 150 mL  Total IN: 150 mL    OUT:    Nasogastric/Oral tube (mL): 900 mL  Total OUT: 900 mL    Total NET: -750 mL      04 Dec 2024 07:01  -  05 Dec 2024 06:36  --------------------------------------------------------  IN:  Total IN: 0 mL    OUT:    Nasogastric/Oral tube (mL): 200 mL  Total OUT: 200 mL    Total NET: -200 mL          LABS:                        12.6   11.11 )-----------( 174      ( 04 Dec 2024 06:27 )             40.2     12-04    140  |  107  |  9   ----------------------------<  184[H]  3.1[L]   |  31  |  0.54    Ca    8.4[L]      04 Dec 2024 06:27  Phos  3.0     12-04  Mg     2.0     12-04

## 2024-12-05 NOTE — DISCHARGE NOTE PROVIDER - NSDCACTIVITY_GEN_ALL_CORE
Showering allowed/Walking - Indoors allowed/No heavy lifting/straining/Walking - Outdoors allowed/Follow Instructions Provided by your Surgical Team/Activity as tolerated Showering allowed/Stairs allowed/Walking - Indoors allowed/No heavy lifting/straining/Walking - Outdoors allowed/Follow Instructions Provided by your Surgical Team/Activity as tolerated

## 2024-12-05 NOTE — DISCHARGE NOTE PROVIDER - NSDCFUADDINST_GEN_ALL_CORE_FT
Please follow up with Dr. Ferreira in the office in two weeks. Please call to make an appointment.   Drink plenty of fluids to prevent constipation and fruit juices that are high in vitamin C. Rest as needed. Do not lift anything heavier than 10 pounds.   Please take tylenol 1000mg and motrin 600mg every 6 hours for pain. Alternate between the two medications.  (Ex: ibuprofen at 9am, 3pm and 9pm and tylenol at 6am, 12pm and 6pm).   You may also take prescribed narcotic medication as needed for severe pain.  Call for any fever over 101, nausea, vomiting, severe pain, no passing of gas or bowel movement.   You may shower and pat dry abdomen. Leave the white steri strips in place; they will fall off in 5-7 days.

## 2024-12-05 NOTE — PROGRESS NOTE ADULT - SUBJECTIVE AND OBJECTIVE BOX
CHIEF COMPLAINT: s/p laparoscopic pelvic adhesiolysis   Passing gas but no BM  NGT out and able to tolerate liquids   no fever  no chest pain or sob       PHYSICAL EXAM:    GENERAL: Obese, no acute distress   CHEST/LUNG:  No wheezing, no crackles   HEART: Regular rate and rhythm; No murmurs  ABDOMEN: Soft, laparoscopic wounds with dressing and no bleeding. BS hypoactive.   EXTREMITIES:  No clubbing, cyanosis, or edema  Psychiatry: AAO x 3, mood is appropriate       OBJECTIVE DATA:     Vital Signs Last 24 Hrs  T(C): 36.6 (05 Dec 2024 10:51), Max: 37.1 (04 Dec 2024 16:52)  T(F): 97.9 (05 Dec 2024 10:51), Max: 98.7 (04 Dec 2024 16:52)  HR: 83 (05 Dec 2024 10:51) (72 - 83)  BP: 121/82 (05 Dec 2024 10:51) (102/75 - 139/67)  BP(mean): --  RR: 16 (05 Dec 2024 10:51) (16 - 18)  SpO2: 96% (05 Dec 2024 10:51) (92% - 96%)    Parameters below as of 05 Dec 2024 10:51  Patient On (Oxygen Delivery Method): room air               Daily     Daily Weight in k.6 (05 Dec 2024 05:05)  LABS:                        12.5   5.93  )-----------( 194      ( 05 Dec 2024 06:27 )             39.9             12-05    139  |  106  |  6[L]  ----------------------------<  180[H]  3.2[L]   |  29  |  0.53    Ca    8.3[L]      05 Dec 2024 06:27  Phos  1.9     12-05  Mg     1.9     12-05                  Urinalysis Basic - ( 05 Dec 2024 06:27 )    Color: x / Appearance: x / SG: x / pH: x  Gluc: 180 mg/dL / Ketone: x  / Bili: x / Urobili: x   Blood: x / Protein: x / Nitrite: x   Leuk Esterase: x / RBC: x / WBC x   Sq Epi: x / Non Sq Epi: x / Bacteria: x           CAPILLARY BLOOD GLUCOSE      POCT Blood Glucose.: 248 mg/dL (05 Dec 2024 11:03)         MEDICATIONS  (STANDING):  benzocaine 20% Spray 1 Spray(s) Topical four times a day  dextrose 5% + sodium chloride 0.45% with potassium chloride 20 mEq/L 1000 milliLiter(s) (140 mL/Hr) IV Continuous <Continuous>  dextrose 5%. 1000 milliLiter(s) (100 mL/Hr) IV Continuous <Continuous>  dextrose 5%. 1000 milliLiter(s) (50 mL/Hr) IV Continuous <Continuous>  dextrose 50% Injectable 25 Gram(s) IV Push once  dextrose 50% Injectable 12.5 Gram(s) IV Push once  dextrose 50% Injectable 25 Gram(s) IV Push once  enoxaparin Injectable 40 milliGRAM(s) SubCutaneous every 12 hours  glucagon  Injectable 1 milliGRAM(s) IntraMuscular once  influenza  Vaccine (HIGH DOSE) 0.5 milliLiter(s) IntraMuscular once  insulin lispro (ADMELOG) corrective regimen sliding scale   SubCutaneous every 6 hours  ketorolac   Injectable 15 milliGRAM(s) IV Push every 6 hours  pantoprazole  Injectable 40 milliGRAM(s) IV Push daily  piperacillin/tazobactam IVPB.. 3.375 Gram(s) IV Intermittent every 8 hours  potassium chloride   Powder 40 milliEquivalent(s) Oral once    MEDICATIONS  (PRN):  dextrose Oral Gel 15 Gram(s) Oral once PRN Blood Glucose LESS THAN 70 milliGRAM(s)/deciliter  hydrALAZINE Injectable 10 milliGRAM(s) IV Push every 8 hours PRN SBP> 130  HYDROmorphone  Injectable 0.5 milliGRAM(s) IV Push every 6 hours PRN Moderate Pain (4 - 6)  HYDROmorphone  Injectable 1 milliGRAM(s) IV Push every 6 hours PRN Severe Pain (7 - 10)  ondansetron Injectable 4 milliGRAM(s) IV Push every 6 hours PRN Nausea and/or Vomiting

## 2024-12-05 NOTE — DISCHARGE NOTE PROVIDER - HOSPITAL COURSE
65 Y/O female with a PMHx of DM, HLD and a PSHx of  x2, hysterectomy presents to the ED for abdominal pain, was found to have SBO. Patient was managed conservatively with NG tube and gastrografin challenge, SBO did not resolve with conservative measures. Patient had a diagnostic laparoscopy with lysis of adhesions. Post op course unremarkable, diet was advanced and tolerated. Patient is stable for discharge.

## 2024-12-05 NOTE — DISCHARGE NOTE PROVIDER - NSDCMRMEDTOKEN_GEN_ALL_CORE_FT
oxyCODONE 5 mg oral tablet: 1 tab(s) orally 4 times a day as needed for  severe pain MDD: 20mg   oxyCODONE 5 mg oral tablet: 1 tab(s) orally every 4 hours as needed for  severe pain MDD: 6

## 2024-12-05 NOTE — DISCHARGE NOTE PROVIDER - NSDCCPTREATMENT_GEN_ALL_CORE_FT
PRINCIPAL PROCEDURE  Procedure: Diagnostic laparoscopy  Findings and Treatment:       SECONDARY PROCEDURE  Procedure: Lysis of pelvic adhesions  Findings and Treatment:

## 2024-12-05 NOTE — DISCHARGE NOTE PROVIDER - CARE PROVIDER_API CALL
Gurpreet Ferreira  Surgery  [address]  [place] [address]  Phone: [phone]  Fax: [phone]  Follow Up Time: 2 weeks

## 2024-12-06 ENCOUNTER — TRANSCRIPTION ENCOUNTER (OUTPATIENT)
Age: 64
End: 2024-12-06

## 2024-12-06 VITALS
RESPIRATION RATE: 16 BRPM | TEMPERATURE: 98 F | OXYGEN SATURATION: 97 % | SYSTOLIC BLOOD PRESSURE: 146 MMHG | HEART RATE: 72 BPM | DIASTOLIC BLOOD PRESSURE: 86 MMHG

## 2024-12-06 LAB
GLUCOSE BLDC GLUCOMTR-MCNC: 160 MG/DL — HIGH (ref 70–99)
GLUCOSE BLDC GLUCOMTR-MCNC: 214 MG/DL — HIGH (ref 70–99)

## 2024-12-06 PROCEDURE — 99232 SBSQ HOSP IP/OBS MODERATE 35: CPT

## 2024-12-06 RX ORDER — OXYCODONE HYDROCHLORIDE 30 MG/1
1 TABLET ORAL
Qty: 24 | Refills: 0
Start: 2024-12-06

## 2024-12-06 RX ORDER — OXYCODONE HYDROCHLORIDE 30 MG/1
1 TABLET ORAL
Qty: 24 | Refills: 0
Start: 2024-12-06 | End: 2024-12-08

## 2024-12-06 RX ORDER — OXYCODONE HYDROCHLORIDE 30 MG/1
1 TABLET ORAL
Qty: 12 | Refills: 0
Start: 2024-12-06 | End: 2024-12-08

## 2024-12-06 RX ADMIN — ONDANSETRON HYDROCHLORIDE 4 MILLIGRAM(S): 4 TABLET, FILM COATED ORAL at 01:15

## 2024-12-06 RX ADMIN — Medication 2: at 11:40

## 2024-12-06 RX ADMIN — Medication 1: at 08:01

## 2024-12-06 RX ADMIN — KETOROLAC TROMETHAMINE 15 MILLIGRAM(S): 30 INJECTION INTRAMUSCULAR; INTRAVENOUS at 00:14

## 2024-12-06 RX ADMIN — PANTOPRAZOLE SODIUM 40 MILLIGRAM(S): 40 TABLET, DELAYED RELEASE ORAL at 11:40

## 2024-12-06 RX ADMIN — PIPERACILLIN SODIUM AND TAZOBACTAM SODIUM 25 GRAM(S): 4; .5 INJECTION, POWDER, LYOPHILIZED, FOR SOLUTION INTRAVENOUS at 13:37

## 2024-12-06 RX ADMIN — KETOROLAC TROMETHAMINE 15 MILLIGRAM(S): 30 INJECTION INTRAMUSCULAR; INTRAVENOUS at 13:32

## 2024-12-06 RX ADMIN — KETOROLAC TROMETHAMINE 15 MILLIGRAM(S): 30 INJECTION INTRAMUSCULAR; INTRAVENOUS at 11:40

## 2024-12-06 RX ADMIN — ENOXAPARIN SODIUM 40 MILLIGRAM(S): 30 INJECTION SUBCUTANEOUS at 05:41

## 2024-12-06 RX ADMIN — PIPERACILLIN SODIUM AND TAZOBACTAM SODIUM 25 GRAM(S): 4; .5 INJECTION, POWDER, LYOPHILIZED, FOR SOLUTION INTRAVENOUS at 05:41

## 2024-12-06 NOTE — DISCHARGE NOTE NURSING/CASE MANAGEMENT/SOCIAL WORK - NSDCPEFALRISK_GEN_ALL_CORE
For information on Fall & Injury Prevention, visit: [email or web address] OR  [email or web address] OR  https://www.cdc.gov/steadi/patient.html

## 2024-12-06 NOTE — PROGRESS NOTE ADULT - SUBJECTIVE AND OBJECTIVE BOX
SURGERY PROGRESS HPI:  Pt seen and examined at bedside. Pain is well controlled, pt denies complaints. No acute events overnight.     Vital Signs Last 24 Hrs  T(C): 36.6 (06 Dec 2024 05:25), Max: 36.8 (05 Dec 2024 23:38)  T(F): 97.9 (06 Dec 2024 05:25), Max: 98.2 (05 Dec 2024 23:38)  HR: 74 (06 Dec 2024 05:25) (74 - 83)  BP: 137/88 (06 Dec 2024 05:25) (121/82 - 137/88)  BP(mean): 104 (06 Dec 2024 05:25) (101 - 104)  RR: 17 (06 Dec 2024 05:25) (16 - 17)  SpO2: 96% (06 Dec 2024 05:25) (94% - 96%)    Parameters below as of 06 Dec 2024 05:25  Patient On (Oxygen Delivery Method): room air          PHYSICAL EXAM:    GENERAL: NAD  CHEST/LUNG: breathing normally  HEART: RRR  ABDOMEN: non distended, soft, non tender, no guarding  EXTREMITIES:  calf soft, non tender b/l

## 2024-12-06 NOTE — DISCHARGE NOTE NURSING/CASE MANAGEMENT/SOCIAL WORK - PATIENT PORTAL LINK FT
You can access the FollowMyHealth Patient Portal offered by [facility] by registering at the following website: [email or web address]. By joining [employer]’s FollowMyHealth portal, you will also be able to view your health information using other applications (apps) compatible with our system.

## 2024-12-06 NOTE — PROGRESS NOTE ADULT - REASON FOR ADMISSION
small bowel obstruction

## 2024-12-06 NOTE — DISCHARGE NOTE NURSING/CASE MANAGEMENT/SOCIAL WORK - FINANCIAL ASSISTANCE
[facility] provides services at a reduced cost to those who are determined to be eligible through [facility]’s financial assistance program. Information regarding [facility]’s financial assistance program can be found by going to [email or web address] or by calling [phone].

## 2024-12-06 NOTE — PROGRESS NOTE ADULT - SUBJECTIVE AND OBJECTIVE BOX
CHIEF COMPLAINT: s/p laparoscopic pelvic adhesiolysis   Passing gas  Tolerating Diet  no fever  no chest pain or sob       PHYSICAL EXAM:    GENERAL: Obese, no acute distress   CHEST/LUNG:  No wheezing, no crackles   HEART: Regular rate and rhythm; No murmurs  ABDOMEN: Soft, laparoscopic wounds with dressing and no bleeding. BS hypoactive.   EXTREMITIES:  No clubbing, cyanosis, or edema  Psychiatry: AAO x 3, mood is appropriate       OBJECTIVE DATA:     Vital Signs Last 24 Hrs  T(C): 36.6 (06 Dec 2024 05:25), Max: 36.8 (05 Dec 2024 23:38)  T(F): 97.9 (06 Dec 2024 05:25), Max: 98.2 (05 Dec 2024 23:38)  HR: 74 (06 Dec 2024 05:25) (74 - 78)  BP: 137/88 (06 Dec 2024 05:25) (134/84 - 137/88)  BP(mean): 104 (06 Dec 2024 05:25) (101 - 104)  RR: 17 (06 Dec 2024 05:25) (17 - 17)  SpO2: 96% (06 Dec 2024 05:25) (94% - 96%)    Parameters below as of 06 Dec 2024 05:25  Patient On (Oxygen Delivery Method): room air               Daily     Daily   LABS:                        12.5   5.93  )-----------( 194      ( 05 Dec 2024 06:27 )             39.9             12-05    139  |  106  |  6[L]  ----------------------------<  180[H]  3.2[L]   |  29  |  0.53    Ca    8.3[L]      05 Dec 2024 06:27  Phos  1.9     12-05  Mg     1.9     12-05                  Urinalysis Basic - ( 05 Dec 2024 06:27 )    Color: x / Appearance: x / SG: x / pH: x  Gluc: 180 mg/dL / Ketone: x  / Bili: x / Urobili: x   Blood: x / Protein: x / Nitrite: x   Leuk Esterase: x / RBC: x / WBC x   Sq Epi: x / Non Sq Epi: x / Bacteria: x           CAPILLARY BLOOD GLUCOSE      POCT Blood Glucose.: 160 mg/dL (06 Dec 2024 07:53)      MEDICATIONS  (STANDING):  benzocaine 20% Spray 1 Spray(s) Topical four times a day  dextrose 5%. 1000 milliLiter(s) (50 mL/Hr) IV Continuous <Continuous>  dextrose 5%. 1000 milliLiter(s) (100 mL/Hr) IV Continuous <Continuous>  dextrose 50% Injectable 25 Gram(s) IV Push once  dextrose 50% Injectable 12.5 Gram(s) IV Push once  dextrose 50% Injectable 25 Gram(s) IV Push once  enoxaparin Injectable 40 milliGRAM(s) SubCutaneous every 12 hours  glucagon  Injectable 1 milliGRAM(s) IntraMuscular once  influenza  Vaccine (HIGH DOSE) 0.5 milliLiter(s) IntraMuscular once  insulin lispro (ADMELOG) corrective regimen sliding scale   SubCutaneous three times a day before meals  ketorolac   Injectable 15 milliGRAM(s) IV Push every 6 hours  pantoprazole  Injectable 40 milliGRAM(s) IV Push daily  piperacillin/tazobactam IVPB.. 3.375 Gram(s) IV Intermittent every 8 hours    MEDICATIONS  (PRN):  dextrose Oral Gel 15 Gram(s) Oral once PRN Blood Glucose LESS THAN 70 milliGRAM(s)/deciliter  hydrALAZINE Injectable 10 milliGRAM(s) IV Push every 8 hours PRN SBP> 130  HYDROmorphone  Injectable 0.5 milliGRAM(s) IV Push every 6 hours PRN Moderate Pain (4 - 6)  HYDROmorphone  Injectable 1 milliGRAM(s) IV Push every 6 hours PRN Severe Pain (7 - 10)  ondansetron Injectable 4 milliGRAM(s) IV Push every 6 hours PRN Nausea and/or Vomiting

## 2024-12-06 NOTE — DISCHARGE NOTE NURSING/CASE MANAGEMENT/SOCIAL WORK - NSDCFUADDAPPT_GEN_ALL_CORE_FT
2021 10:58 PM 
 
 
 
Mr. Lisandro Lozoya 
[address] [facility] [phone] :  1970 Please see attached lab results. --Blood counts are normal (hemoglobin/anemia testing, platelets). white blood cells are still mildly elevated. We can review further testing at your next visit. --Kidney and liver testing normal. 
 
 
Please let me know if you have other questions.  
 
Galen Cornell MD 
 
 
 
 
 

Please call to make a follow up appointment with Dr. Ferreira's office in 2 weeks.

## 2024-12-06 NOTE — PROGRESS NOTE ADULT - PROVIDER SPECIALTY LIST ADULT
Hospitalist
Surgery
Hospitalist
Surgery
Hospitalist
Hospitalist
Surgery
Surgery
Hospitalist
Surgery

## 2024-12-06 NOTE — PROGRESS NOTE ADULT - ASSESSMENT
63 Y/O female POD 2 s/p diagnostic laparoscopy with lysis of adhesions.   Passing flatus. NGT self dc/d 12/4.   Remains afebrile.     PLAN:     - F/U AM labs   - ADAT; F/U PO tolerance   - DVT ppx; IS; OOB/AAT   - GI ppx   - Continue Zosyn   
63 Y/O female with a PMHx of DM, HLD and a PSHx of  x2, hysterectomy presents to the ED for abdominal pain. Found to have SBO. Medical consulted for HTN and medical optimization.    #SBO  - Patient is medically optimized for surgery  - EKG w/ NSR at 81 bpm.  - Denies chest pain or SOB. No difficulty ambulating 1 flight of stairs  - RCRI score is 1 and associated with 6% of major cardiac event. METS level of activity and has no active cardiac condition. Patient may proceed with surgery with no additional cardiac testing.   - Given uprending leukocytosis, discussed with surgery to consider adding Zosyn    #HTN  - Given NPO, will start IV hydral 10 Q8H
63 Y/O female with a PMHx of DM, HLD and a PSHx of  x2, hysterectomy presents to the ED for abdominal pain. Found to have SBO. Medical consulted for HTN and medical optimization.    #SBO from likely prior hx of C-sections and hysterectomy.   s/p successful laparoscopic adhesiolysis.   Tolerating oral liquid  NGT out.   surgery following  on iv zosyn. (can give short course of 5 days if surgically indicated)    #HTN  - controlled. cont current management and monitor.     Hypokalemia: Repleted    Hx of DM type 2. uncontrolled with hyperglycemia. Added lantus low dose. cont current other management. Follow finger sticks     Obesity class 2. Outpatient diet and exercise.     [facility]  Time spent by me managing the patient including but not limited to reviewing the chart, discussion with the IDR team (nurse//care manager/ACP), physical exam and assessment and plan is 35 mins 
65 Y/O female with a PMHx of DM, HLD and a PSHx of  x2, hysterectomy presents to the ED for abdominal pain. Found to have SBO. Medical consulted for HTN and medical optimization.    #SBO  - Patient is medically optimized for surgery  - EKG w/ NSR at 81 bpm.  - Denies chest pain or SOB. No difficulty ambulating 1 flight of stairs  - RCRI score is 1 and associated with 6% of major cardiac event. METS level of activity and has no active cardiac condition. Patient may proceed with surgery with no additional cardiac testing.     #HTN  - Given NPO, will start IV hydral 10 Q8H
63 Y/O female with a PMHx of DM, HLD and a PSHx of  x2, hysterectomy presents to the ED for abdominal pain. Found to have SBO. Medical consulted for HTN and medical optimization.    #SBO from likely prior hx of C-sections and hysterectomy.   s/p successful laparoscopic adhesiolysis.   Tolerating diet  NGT out.   on iv zosyn inpatient. no antibiotic after discharge.     #HTN  - controlled. cont current management and monitor.     Hypokalemia: Repleted    Hx of DM type 2. uncontrolled with hyperglycemia. Added lantus low dose. cont current other management. Follow finger sticks     Obesity class 2. Outpatient diet and exercise.     [facility]  Time spent by me managing the patient including but not limited to reviewing the chart, discussion with the IDR team (nurse//care manager/ACP), physical exam and assessment and plan is 35 mins 
Pt is POD 3 after diagnostic laparoscopy, lysis of adhesions for SBO  Doing well, diet advance to regular, if tolerates this AM will go home with outpt follow up
63 Y/O female with a PMHx of DM, HLD and a PSHx of  x2, hysterectomy presents to the ED for abdominal pain. Found to have SBO. Medical consulted for HTN and medical optimization.    #SBO from likely prior hx of C-sections and hysterectomy.   s/p successful laparoscopic adhesiolysis.   cont local wound care.   NGT to suction per surgery recs.   Gentle hydration and current pain control.   Cont zosyn for leukocytosis and fever spike during this hospitalization. short course likely.     #HTN  - controlled. cont current management and monitor.     Hypokalemia: Replete and recheck     Hx of DM type 2. BS controlled. cont correctional insulin. Follow finger sticks.     Obesity class 2. Outpatient diet and exercise.     [facility]  Time spent by me managing the patient including but not limited to reviewing the chart, discussion with the IDR team (nurse//care manager/ACP), physical exam and assessment and plan is 36 mins

## 2024-12-09 PROBLEM — E78.5 HYPERLIPIDEMIA, UNSPECIFIED: Chronic | Status: ACTIVE | Noted: 2024-11-28

## 2024-12-13 DIAGNOSIS — K56.699 OTHER INTESTINAL OBSTRUCTION UNSPECIFIED AS TO PARTIAL VERSUS COMPLETE OBSTRUCTION: ICD-10-CM

## 2024-12-13 DIAGNOSIS — Z79.52 LONG TERM (CURRENT) USE OF SYSTEMIC STEROIDS: ICD-10-CM

## 2024-12-13 DIAGNOSIS — I10 ESSENTIAL (PRIMARY) HYPERTENSION: ICD-10-CM

## 2024-12-13 DIAGNOSIS — E11.65 TYPE 2 DIABETES MELLITUS WITH HYPERGLYCEMIA: ICD-10-CM

## 2024-12-13 DIAGNOSIS — E87.6 HYPOKALEMIA: ICD-10-CM

## 2024-12-13 DIAGNOSIS — E78.5 HYPERLIPIDEMIA, UNSPECIFIED: ICD-10-CM

## 2024-12-13 DIAGNOSIS — K66.8 OTHER SPECIFIED DISORDERS OF PERITONEUM: ICD-10-CM

## 2024-12-13 DIAGNOSIS — E66.812 OBESITY, CLASS 2: ICD-10-CM

## 2024-12-13 DIAGNOSIS — Z88.8 ALLERGY STATUS TO OTHER DRUGS, MEDICAMENTS AND BIOLOGICAL SUBSTANCES: ICD-10-CM

## 2024-12-13 DIAGNOSIS — Z79.4 LONG TERM (CURRENT) USE OF INSULIN: ICD-10-CM

## 2024-12-13 DIAGNOSIS — K59.00 CONSTIPATION, UNSPECIFIED: ICD-10-CM

## 2024-12-13 DIAGNOSIS — E83.42 HYPOMAGNESEMIA: ICD-10-CM

## 2024-12-18 ENCOUNTER — APPOINTMENT (OUTPATIENT)
Dept: SURGERY | Facility: CLINIC | Age: 64
End: 2024-12-18

## 2024-12-18 ENCOUNTER — NON-APPOINTMENT (OUTPATIENT)
Age: 64
End: 2024-12-18

## 2024-12-18 VITALS
WEIGHT: 238 LBS | OXYGEN SATURATION: 97 % | BODY MASS INDEX: 37.35 KG/M2 | HEART RATE: 79 BPM | DIASTOLIC BLOOD PRESSURE: 76 MMHG | HEIGHT: 67 IN | SYSTOLIC BLOOD PRESSURE: 108 MMHG | TEMPERATURE: 98.3 F

## 2024-12-18 PROCEDURE — 99024 POSTOP FOLLOW-UP VISIT: CPT

## 2025-01-22 NOTE — ED PROVIDER NOTE - CARE PLAN
Principal Discharge DX:	Right knee pain   1 Detail Level: Detailed Render Note In Bullet Format When Appropriate: No Post-Care Instructions: I reviewed with the patient in detail post-care instructions. Patient is to wear sunprotection, and avoid picking at any of the treated lesions. Pt may apply Vaseline to crusted or scabbing areas. Consent: The patient's consent was obtained including but not limited to risks of crusting, scabbing, blistering, scarring, darker or lighter pigmentary change, recurrence, incomplete removal and infection. Show Applicator Variable?: Yes Duration Of Freeze Thaw-Cycle (Seconds): 0

## 2025-05-25 ENCOUNTER — EMERGENCY (EMERGENCY)
Facility: HOSPITAL | Age: 65
LOS: 0 days | Discharge: HOME | End: 2025-05-25
Attending: STUDENT IN AN ORGANIZED HEALTH CARE EDUCATION/TRAINING PROGRAM
Payer: MEDICARE

## 2025-05-25 VITALS
HEART RATE: 94 BPM | RESPIRATION RATE: 18 BRPM | SYSTOLIC BLOOD PRESSURE: 117 MMHG | TEMPERATURE: 99 F | OXYGEN SATURATION: 95 % | DIASTOLIC BLOOD PRESSURE: 72 MMHG

## 2025-05-25 VITALS
RESPIRATION RATE: 16 BRPM | DIASTOLIC BLOOD PRESSURE: 64 MMHG | OXYGEN SATURATION: 98 % | SYSTOLIC BLOOD PRESSURE: 124 MMHG | TEMPERATURE: 98 F | HEART RATE: 90 BPM | HEIGHT: 67 IN | WEIGHT: 229.94 LBS

## 2025-05-25 DIAGNOSIS — E11.9 TYPE 2 DIABETES MELLITUS WITHOUT COMPLICATIONS: ICD-10-CM

## 2025-05-25 DIAGNOSIS — K59.00 CONSTIPATION, UNSPECIFIED: ICD-10-CM

## 2025-05-25 DIAGNOSIS — Z98.890 OTHER SPECIFIED POSTPROCEDURAL STATES: Chronic | ICD-10-CM

## 2025-05-25 DIAGNOSIS — K21.9 GASTRO-ESOPHAGEAL REFLUX DISEASE WITHOUT ESOPHAGITIS: ICD-10-CM

## 2025-05-25 DIAGNOSIS — E78.5 HYPERLIPIDEMIA, UNSPECIFIED: ICD-10-CM

## 2025-05-25 DIAGNOSIS — I10 ESSENTIAL (PRIMARY) HYPERTENSION: ICD-10-CM

## 2025-05-25 DIAGNOSIS — C18.9 MALIGNANT NEOPLASM OF COLON, UNSPECIFIED: ICD-10-CM

## 2025-05-25 DIAGNOSIS — Z90.710 ACQUIRED ABSENCE OF BOTH CERVIX AND UTERUS: Chronic | ICD-10-CM

## 2025-05-25 DIAGNOSIS — K92.1 MELENA: ICD-10-CM

## 2025-05-25 DIAGNOSIS — Z98.891 HISTORY OF UTERINE SCAR FROM PREVIOUS SURGERY: Chronic | ICD-10-CM

## 2025-05-25 PROCEDURE — 99283 EMERGENCY DEPT VISIT LOW MDM: CPT

## 2025-05-25 RX ORDER — SENNA 187 MG
2 TABLET ORAL ONCE
Refills: 0 | Status: COMPLETED | OUTPATIENT
Start: 2025-05-25 | End: 2025-05-25

## 2025-05-25 RX ORDER — POLYETHYLENE GLYCOL 3350 17 G/17G
17 POWDER, FOR SOLUTION ORAL ONCE
Refills: 0 | Status: COMPLETED | OUTPATIENT
Start: 2025-05-25 | End: 2025-05-25

## 2025-05-25 RX ORDER — SALINE 7; 19 G/118ML; G/118ML
1 ENEMA RECTAL ONCE
Refills: 0 | Status: COMPLETED | OUTPATIENT
Start: 2025-05-25 | End: 2025-05-25

## 2025-05-25 RX ORDER — POLYETHYLENE GLYCOL 3350 17 G/17G
17 POWDER, FOR SOLUTION ORAL
Qty: 1 | Refills: 0
Start: 2025-05-25 | End: 2025-05-31

## 2025-05-25 RX ADMIN — SALINE 1 ENEMA: 7; 19 ENEMA RECTAL at 21:32

## 2025-05-25 RX ADMIN — Medication 2 TABLET(S): at 21:26

## 2025-05-25 RX ADMIN — POLYETHYLENE GLYCOL 3350 17 GRAM(S): 17 POWDER, FOR SOLUTION ORAL at 21:27

## 2025-05-25 NOTE — ED ADULT NURSE NOTE - CHIEF COMPLAINT QUOTE
BIBA,  constipation since thursday, bright red blood in stool since yesterday. rectal pain.  pmh- colon cancer, current chemo treatment.  colorectal sx in february 2025.  pt took miralax at 5pm

## 2025-05-25 NOTE — ED PROVIDER NOTE - PATIENT PORTAL LINK FT
You can access the FollowMyHealth Patient Portal offered by [facility] by registering at the following website: [email or web address]. By joining [facility]’s FollowMyHealth portal, you will also be able to view your health information using other applications (apps) compatible with our system.

## 2025-05-25 NOTE — ED ADULT NURSE NOTE - OBJECTIVE STATEMENT
Pt is a 66yo Female AAOx4 allergies to metformin pmh colorectal surg, colon CA, uterine CA, dmII, total hysterectomy pw constipation starting Thursday. Pt reports slight rectal bleeding, and rectal pain 9/10 secondary to straining. Pt reports trying miralax with no relief. Pt denies any nausea, abd pain, cp, sob at this time. Pt meds administered as ordered. Pt reports relief from constipation, and successful bowel movement shortly after administration. Pt reports feeling ready to go home. ED provider made aware.

## 2025-05-25 NOTE — ED PROVIDER NOTE - CLINICAL SUMMARY MEDICAL DECISION MAKING FREE TEXT BOX
This is a 65-year-old, history of DM, GERD, HLD, HTN, colon CA, on chemotherapy presenting to the emergency department for constipation.  The patient states that she has had constipation since Thursday.  The patient states that the constipation began after her last chemo session.  The patient states that she has been straining and feels like there is a ball of stool at the tip of her backed up.  The patient states that she also has discomfort with sitting down because of the pain.  The patient denies any nausea, vomiting, or abdominal pain.  The patient states that all of her pressure is in the rectal region.  The patient denies any fevers, chills, chest pain or shortness of breath.  The patient states she tried MiraLAX at home but had no symptom relief.  The patient states yesterday while straining she did notice some blood in her stool but she believes this is secondary to straining.  The patient states there was not any use amount of blood.  ROS is otherwise negative.    VSS.    PE.  GENERAL: Awake, alert, NAD  HEENT: NC/AT, moist mucous membranes, PERRL, EOMI  LUNGS: CTAB, no wheezes or crackles   CARDIAC: RRR, no m/r/g  ABDOMEN: Soft, non tender, non distended, no rebound, no guarding  BACK: No midline spinal tenderness, no CVA tenderness  EXT: No edema, no calf tenderness, 2+ DP pulses bilaterally, no deformities.  NEURO: A&Ox3. Moving all extremities.  SKIN: Warm and dry. No rash.  PSYCH: Normal affect.      The patient is not having any abdominal pain, nausea, or vomiting.  The patient states that she feels like the stool is stuck in her rectum.  Low suspicion for SBO at this time.  Will give patient medication for constipation, and a smog enema.  Will reassess after medication for possible disimpaction or further questions CT imaging patient otherwise comfortable.  Will give medication, hold off on labs and imaging.  Final dispo pending reassessment.

## 2025-05-25 NOTE — ED PROVIDER NOTE - NSFOLLOWUPINSTRUCTIONS_ED_ALL_ED_FT
You were seen in th ED for constipation.    Follow up with your primary doctor.    Take miralax as prescribed.      Constipation is when a person has fewer than three bowel movements a week, has difficulty having a bowel movement, or has stools that are dry, hard, or larger than normal. As people grow older, constipation is more common. A low-fiber diet, not taking in enough fluids, and taking certain medicines may make constipation worse.     CAUSES  Certain medicines, such as antidepressants, pain medicine, iron supplements, antacids, and water pills.    Certain diseases, such as diabetes, irritable bowel syndrome (IBS), thyroid disease, or depression.    Not drinking enough water.    Not eating enough fiber-rich foods.    Stress or travel.    Lack of physical activity or exercise.    Ignoring the urge to have a bowel movement.    Using laxatives too much.      SIGNS AND SYMPTOMS  Having fewer than three bowel movements a week.    Straining to have a bowel movement.    Having stools that are hard, dry, or larger than normal.    Feeling full or bloated.    Pain in the lower abdomen.    Not feeling relief after having a bowel movement.      DIAGNOSIS  Your health care provider will take a medical history and perform a physical exam. Further testing may be done for severe constipation. Some tests may include:    A barium enema X-ray to examine your rectum, colon, and, sometimes, your small intestine.    A sigmoidoscopy to examine your lower colon.    A colonoscopy to examine your entire colon.     TREATMENT  Treatment will depend on the severity of your constipation and what is causing it. Some dietary treatments include drinking more fluids and eating more fiber-rich foods. Lifestyle treatments may include regular exercise. If these diet and lifestyle recommendations do not help, your health care provider may recommend taking over-the-counter laxative medicines to help you have bowel movements. Prescription medicines may be prescribed if over-the-counter medicines do not work.     HOME CARE INSTRUCTIONS  Eat foods that have a lot of fiber, such as fruits, vegetables, whole grains, and beans.  Limit foods high in fat and processed sugars, such as french fries, hamburgers, cookies, candies, and soda.    A fiber supplement may be added to your diet if you cannot get enough fiber from foods.    Drink enough fluids to keep your urine clear or pale yellow.    Exercise regularly or as directed by your health care provider.    Go to the restroom when you have the urge to go. Do not hold it.    Only take over-the-counter or prescription medicines as directed by your health care provider. Do not take other medicines for constipation without talking to your health care provider first.       SEEK IMMEDIATE MEDICAL CARE IF:  You have bright red blood in your stool.    Your constipation lasts for more than 4 days or gets worse.    You have abdominal or rectal pain.    You have thin, pencil-like stools.    You have unexplained weight loss.     MAKE SURE YOU:  Understand these instructions.  Will watch your condition.  Will get help right away if you are not doing well or get worse.

## 2025-05-25 NOTE — ED ADULT TRIAGE NOTE - CHIEF COMPLAINT QUOTE
BIBA,  constipation since thursday, bright red blood in stool since yesterday.  colon cancer, chemo treatment.  colorectal sx in february.  pt took miralax at 5pm BIBA,  constipation since thursday, bright red blood in stool since yesterday. rectal pain.  [facility]- colon cancer, current chemo treatment.  colorectal sx in february 2025.  pt took miralax at 5pm

## 2025-05-25 NOTE — ED PROVIDER NOTE - ATTENDING CONTRIBUTION TO CARE
64 y/o M with PMH DM, GERD, HLD, HTN, colon CA, on chemotherapy presenting to the emergency department for constipation.  The patient states that she has had constipation since Thursday.  The patient states that the constipation began after her last chemo session.  The patient states that she has been straining and feels like there is a ball of stool at the tip of her backed up.  The patient states that she also has discomfort with sitting down because of the pain.  The patient denies any nausea, vomiting, or abdominal pain.  The patient states that all of her pressure is in the rectal region.  The patient denies any fevers, chills, chest pain or shortness of breath.  The patient states she tried MiraLAX at home but had no symptom relief.  The patient states yesterday while straining she did notice some blood in her stool but she believes this is secondary to straining.  The patient states there was not any use amount of blood.  ROS is otherwise negative.    In the ED, vitals stable.    No significant abd tenderness.    Will trial enema. Defer labs/imaging at this time.    Patient feeling improved after enema.

## 2025-09-13 ENCOUNTER — EMERGENCY (EMERGENCY)
Facility: HOSPITAL | Age: 65
LOS: 0 days | Discharge: HOME | End: 2025-09-13
Attending: STUDENT IN AN ORGANIZED HEALTH CARE EDUCATION/TRAINING PROGRAM
Payer: MEDICARE

## 2025-09-13 VITALS
TEMPERATURE: 98 F | DIASTOLIC BLOOD PRESSURE: 80 MMHG | OXYGEN SATURATION: 99 % | WEIGHT: 145.95 LBS | SYSTOLIC BLOOD PRESSURE: 115 MMHG | HEART RATE: 85 BPM | RESPIRATION RATE: 19 BRPM

## 2025-09-13 VITALS
DIASTOLIC BLOOD PRESSURE: 84 MMHG | TEMPERATURE: 98 F | HEART RATE: 85 BPM | RESPIRATION RATE: 16 BRPM | OXYGEN SATURATION: 99 % | SYSTOLIC BLOOD PRESSURE: 136 MMHG

## 2025-09-13 DIAGNOSIS — R42 DIZZINESS AND GIDDINESS: ICD-10-CM

## 2025-09-13 DIAGNOSIS — Z90.710 ACQUIRED ABSENCE OF BOTH CERVIX AND UTERUS: Chronic | ICD-10-CM

## 2025-09-13 DIAGNOSIS — Z98.891 HISTORY OF UTERINE SCAR FROM PREVIOUS SURGERY: Chronic | ICD-10-CM

## 2025-09-13 DIAGNOSIS — Z98.890 OTHER SPECIFIED POSTPROCEDURAL STATES: Chronic | ICD-10-CM

## 2025-09-13 DIAGNOSIS — Z88.8 ALLERGY STATUS TO OTHER DRUGS, MEDICAMENTS AND BIOLOGICAL SUBSTANCES: ICD-10-CM

## 2025-09-13 LAB
ALBUMIN SERPL ELPH-MCNC: 3.8 G/DL — SIGNIFICANT CHANGE UP (ref 3.3–5)
ALP SERPL-CCNC: 93 U/L — SIGNIFICANT CHANGE UP (ref 40–120)
ALT FLD-CCNC: 38 U/L — SIGNIFICANT CHANGE UP (ref 12–78)
ANION GAP SERPL CALC-SCNC: 4 MMOL/L — LOW (ref 5–17)
AST SERPL-CCNC: 33 U/L — SIGNIFICANT CHANGE UP (ref 15–37)
BASOPHILS # BLD AUTO: 0.04 K/UL — SIGNIFICANT CHANGE UP (ref 0–0.2)
BASOPHILS NFR BLD AUTO: 0.5 % — SIGNIFICANT CHANGE UP (ref 0–2)
BILIRUB SERPL-MCNC: 0.4 MG/DL — SIGNIFICANT CHANGE UP (ref 0.2–1.2)
BUN SERPL-MCNC: 9 MG/DL — SIGNIFICANT CHANGE UP (ref 7–23)
CALCIUM SERPL-MCNC: 9.4 MG/DL — SIGNIFICANT CHANGE UP (ref 8.5–10.1)
CHLORIDE SERPL-SCNC: 106 MMOL/L — SIGNIFICANT CHANGE UP (ref 96–108)
CK SERPL-CCNC: 191 U/L — SIGNIFICANT CHANGE UP (ref 26–192)
CO2 SERPL-SCNC: 29 MMOL/L — SIGNIFICANT CHANGE UP (ref 22–31)
CREAT SERPL-MCNC: 0.66 MG/DL — SIGNIFICANT CHANGE UP (ref 0.5–1.3)
EGFR: 97 ML/MIN/1.73M2 — SIGNIFICANT CHANGE UP
EGFR: 97 ML/MIN/1.73M2 — SIGNIFICANT CHANGE UP
EOSINOPHIL # BLD AUTO: 0.06 K/UL — SIGNIFICANT CHANGE UP (ref 0–0.5)
EOSINOPHIL NFR BLD AUTO: 0.8 % — SIGNIFICANT CHANGE UP (ref 0–6)
GLUCOSE SERPL-MCNC: 154 MG/DL — HIGH (ref 70–99)
HCT VFR BLD CALC: 43.6 % — SIGNIFICANT CHANGE UP (ref 34.5–45)
HGB BLD-MCNC: 13.6 G/DL — SIGNIFICANT CHANGE UP (ref 11.5–15.5)
IMM GRANULOCYTES NFR BLD AUTO: 0.1 % — SIGNIFICANT CHANGE UP (ref 0–0.9)
LYMPHOCYTES # BLD AUTO: 4.87 K/UL — HIGH (ref 1–3.3)
LYMPHOCYTES # BLD AUTO: 66.7 % — HIGH (ref 13–44)
MAGNESIUM SERPL-MCNC: 2.3 MG/DL — SIGNIFICANT CHANGE UP (ref 1.6–2.6)
MCHC RBC-ENTMCNC: 27.9 PG — SIGNIFICANT CHANGE UP (ref 27–34)
MCHC RBC-ENTMCNC: 31.2 G/DL — LOW (ref 32–36)
MCV RBC AUTO: 89.3 FL — SIGNIFICANT CHANGE UP (ref 80–100)
MONOCYTES # BLD AUTO: 0.56 K/UL — SIGNIFICANT CHANGE UP (ref 0–0.9)
MONOCYTES NFR BLD AUTO: 7.7 % — SIGNIFICANT CHANGE UP (ref 2–14)
NEUTROPHILS # BLD AUTO: 1.76 K/UL — LOW (ref 1.8–7.4)
NEUTROPHILS NFR BLD AUTO: 24.2 % — LOW (ref 43–77)
NRBC BLD AUTO-RTO: 0 /100 WBCS — SIGNIFICANT CHANGE UP (ref 0–0)
PLATELET # BLD AUTO: 215 K/UL — SIGNIFICANT CHANGE UP (ref 150–400)
POTASSIUM SERPL-MCNC: 4.5 MMOL/L — SIGNIFICANT CHANGE UP (ref 3.5–5.3)
POTASSIUM SERPL-SCNC: 4.5 MMOL/L — SIGNIFICANT CHANGE UP (ref 3.5–5.3)
PROT SERPL-MCNC: 7.9 GM/DL — SIGNIFICANT CHANGE UP (ref 6–8.3)
RBC # BLD: 4.88 M/UL — SIGNIFICANT CHANGE UP (ref 3.8–5.2)
RBC # FLD: 13.5 % — SIGNIFICANT CHANGE UP (ref 10.3–14.5)
SODIUM SERPL-SCNC: 139 MMOL/L — SIGNIFICANT CHANGE UP (ref 135–145)
WBC # BLD: 7.3 K/UL — SIGNIFICANT CHANGE UP (ref 3.8–10.5)
WBC # FLD AUTO: 7.3 K/UL — SIGNIFICANT CHANGE UP (ref 3.8–10.5)

## 2025-09-13 PROCEDURE — 93010 ELECTROCARDIOGRAM REPORT: CPT

## 2025-09-13 PROCEDURE — 70450 CT HEAD/BRAIN W/O DYE: CPT | Mod: 26

## 2025-09-13 PROCEDURE — 99285 EMERGENCY DEPT VISIT HI MDM: CPT

## 2025-09-13 RX ORDER — MECLIZINE HCL 12.5 MG
25 TABLET ORAL ONCE
Refills: 0 | Status: COMPLETED | OUTPATIENT
Start: 2025-09-13 | End: 2025-09-13

## 2025-09-13 RX ORDER — MECLIZINE HCL 12.5 MG
1 TABLET ORAL
Qty: 9 | Refills: 0
Start: 2025-09-13 | End: 2025-09-15

## 2025-09-13 RX ADMIN — Medication 1000 MILLILITER(S): at 12:45

## 2025-09-13 RX ADMIN — Medication 25 MILLIGRAM(S): at 12:45

## (undated) DEVICE — VENODYNE/SCD SLEEVE CALF MEDIUM

## (undated) DEVICE — FOLEY TRAY 16FR 5CC LF UMETER CLOSED

## (undated) DEVICE — DRSG TELFA 3 X 8

## (undated) DEVICE — SUT PDS II 1 48" TP-1

## (undated) DEVICE — POOLE SUCTION TIP

## (undated) DEVICE — PROTECTOR HEEL / ELBOW FLUFFY

## (undated) DEVICE — LAP PAD W RING 18 X 18"

## (undated) DEVICE — SOL IRR POUR NS 0.9% 1500ML

## (undated) DEVICE — SUT VICRYL PLUS 2-0 18" TIES UNDYED

## (undated) DEVICE — GLV 7.5 PROTEXIS (WHITE)

## (undated) DEVICE — CANISTER DISPOSABLE THIN WALL 3000CC

## (undated) DEVICE — PACK MINOR WITH LAP

## (undated) DEVICE — STAPLER SKIN MULTI DIRECTION W35

## (undated) DEVICE — LIGASURE IMPACT

## (undated) DEVICE — DRAPE MAYO STAND 23"

## (undated) DEVICE — DRAPE LAPAROTOMY W VELCRO CORD TABS

## (undated) DEVICE — ELCTR GROUNDING PAD ADULT COVIDIEN

## (undated) DEVICE — DRAPE TOWEL BLUE 17" X 24"

## (undated) DEVICE — SUT PROLENE 2 60" TP-1

## (undated) DEVICE — SOL IRR POUR H2O 1500ML

## (undated) DEVICE — PACK MAJOR ABDOMINAL WITH LAP

## (undated) DEVICE — FOLEY CATH 2-WAY 14FR 5CC SILICONE

## (undated) DEVICE — DRAPE 3/4 SHEET 52X76"

## (undated) DEVICE — GLV 7 PROTEXIS (WHITE)

## (undated) DEVICE — LABELS BLANK W PEN

## (undated) DEVICE — DRSG TEGADERM 4 X 4.75"

## (undated) DEVICE — SUT VICRYL 3-0 18" SH UNDYED (POP-OFF)

## (undated) DEVICE — POSITIONER STRAP ARMBOARD VELCRO TS-30

## (undated) DEVICE — SUT CHROMIC 3-0 27" SH

## (undated) DEVICE — SUT VICRYL 2-0 27" SH UNDYED

## (undated) DEVICE — SUT SILK 3-0 18" SH (POP-OFF)

## (undated) DEVICE — DRAPE SOL WARMING 44X44IN

## (undated) DEVICE — WARMING BLANKET FULL UNDERBODY

## (undated) DEVICE — SUT PROLENE 1 30" CT-1

## (undated) DEVICE — FRA-ESU BOVIE FORCE TRIAD T6D04558DX: Type: DURABLE MEDICAL EQUIPMENT